# Patient Record
Sex: MALE | Employment: FULL TIME | URBAN - METROPOLITAN AREA
[De-identification: names, ages, dates, MRNs, and addresses within clinical notes are randomized per-mention and may not be internally consistent; named-entity substitution may affect disease eponyms.]

---

## 2019-05-29 ENCOUNTER — HOSPITAL ENCOUNTER (EMERGENCY)
Age: 24
Discharge: HOME | End: 2019-05-29
Payer: COMMERCIAL

## 2019-05-29 VITALS
HEART RATE: 84 BPM | OXYGEN SATURATION: 99 % | TEMPERATURE: 98 F | BODY MASS INDEX: 41.75 KG/M2 | RESPIRATION RATE: 16 BRPM | DIASTOLIC BLOOD PRESSURE: 80 MMHG | WEIGHT: 315 LBS | SYSTOLIC BLOOD PRESSURE: 136 MMHG | HEIGHT: 73 IN

## 2019-05-29 DIAGNOSIS — Z20.2 STD EXPOSURE: Primary | ICD-10-CM

## 2019-05-29 LAB
AMORPHOUS: ABNORMAL
BACTERIA: ABNORMAL /HPF
BILIRUBIN URINE: NEGATIVE
BLOOD, URINE: NEGATIVE
CLARITY: ABNORMAL
COLOR: YELLOW
EPITHELIAL CELLS, UA: ABNORMAL /HPF
GLUCOSE URINE: NEGATIVE MG/DL
KETONES, URINE: NEGATIVE MG/DL
LEUKOCYTE ESTERASE, URINE: NEGATIVE
NITRITE, URINE: NEGATIVE
PH UA: 7 (ref 5–9)
PROTEIN UA: NEGATIVE MG/DL
RBC UA: ABNORMAL /HPF (ref 0–2)
SPECIFIC GRAVITY UA: 1.01 (ref 1–1.03)
UROBILINOGEN, URINE: 0.2 E.U./DL
WBC UA: ABNORMAL /HPF (ref 0–5)

## 2019-05-29 PROCEDURE — 81001 URINALYSIS AUTO W/SCOPE: CPT

## 2019-05-29 PROCEDURE — 87088 URINE BACTERIA CULTURE: CPT

## 2019-05-29 PROCEDURE — 96372 THER/PROPH/DIAG INJ SC/IM: CPT

## 2019-05-29 PROCEDURE — 2500000003 HC RX 250 WO HCPCS: Performed by: NURSE PRACTITIONER

## 2019-05-29 PROCEDURE — 6370000000 HC RX 637 (ALT 250 FOR IP): Performed by: NURSE PRACTITIONER

## 2019-05-29 PROCEDURE — 6360000002 HC RX W HCPCS: Performed by: NURSE PRACTITIONER

## 2019-05-29 PROCEDURE — 99283 EMERGENCY DEPT VISIT LOW MDM: CPT

## 2019-05-29 RX ORDER — ONDANSETRON 4 MG/1
4 TABLET, ORALLY DISINTEGRATING ORAL ONCE
Status: COMPLETED | OUTPATIENT
Start: 2019-05-29 | End: 2019-05-29

## 2019-05-29 RX ORDER — METRONIDAZOLE 500 MG/1
2000 TABLET ORAL ONCE
Status: COMPLETED | OUTPATIENT
Start: 2019-05-29 | End: 2019-05-29

## 2019-05-29 RX ORDER — AZITHROMYCIN 250 MG/1
1000 TABLET, FILM COATED ORAL ONCE
Status: COMPLETED | OUTPATIENT
Start: 2019-05-29 | End: 2019-05-29

## 2019-05-29 RX ADMIN — METRONIDAZOLE 2000 MG: 500 TABLET, FILM COATED ORAL at 15:50

## 2019-05-29 RX ADMIN — LIDOCAINE HYDROCHLORIDE 250 MG: 10 INJECTION, SOLUTION EPIDURAL; INFILTRATION; INTRACAUDAL; PERINEURAL at 15:50

## 2019-05-29 RX ADMIN — AZITHROMYCIN 1000 MG: 250 TABLET, FILM COATED ORAL at 15:50

## 2019-05-29 RX ADMIN — ONDANSETRON 4 MG: 4 TABLET, ORALLY DISINTEGRATING ORAL at 15:50

## 2019-05-29 NOTE — ED PROVIDER NOTES
examination. · Integument:  Normal turgor. Warm, dry, without visible rash, unless noted elsewhere. Lymphatics: No lymphangitis or adenopathy noted. · Neurological:  Orientation age-appropriate. Motor functions intact. Lab / Imaging Results   (All laboratory and radiology results have been personally reviewed by myself)  Labs:  Results for orders placed or performed during the hospital encounter of 05/29/19   Urinalysis   Result Value Ref Range    Color, UA Yellow Straw/Yellow    Clarity, UA TURBID (A) Clear    Glucose, Ur Negative Negative mg/dL    Bilirubin Urine Negative Negative    Ketones, Urine Negative Negative mg/dL    Specific Gravity, UA 1.015 1.005 - 1.030    Blood, Urine Negative Negative    pH, UA 7.0 5.0 - 9.0    Protein, UA Negative Negative mg/dL    Urobilinogen, Urine 0.2 <2.0 E.U./dL    Nitrite, Urine Negative Negative    Leukocyte Esterase, Urine Negative Negative     Imaging: All Radiology results interpreted by Radiologist unless otherwise noted. No orders to display     ED Course / Medical Decision Making     Medications   cefTRIAXone (ROCEPHIN) 250 mg in lidocaine 1 % 1 mL IM Injection (250 mg Intramuscular Given 5/29/19 1550)   azithromycin (ZITHROMAX) tablet 1,000 mg (1,000 mg Oral Given 5/29/19 1550)   metroNIDAZOLE (FLAGYL) tablet 2,000 mg (2,000 mg Oral Given 5/29/19 1550)   ondansetron (ZOFRAN-ODT) disintegrating tablet 4 mg (4 mg Oral Given 5/29/19 1550)        Consult(s):   None    Procedure(s):   none    Medical Decision Making:    Plan to obtain cultures and treat for suspected STD and provide outpatient follow-up instructions. Discussed safe sex practices and partner notification analysis was negative for UTI has no symptoms at this time. Counseling: The emergency provider has spoken with the patient and discussed todays results, in addition to providing specific details for the plan of care and counseling regarding the diagnosis and prognosis.   Questions are answered at this time and they are agreeable with the plan. Assessment     1. STD exposure      Plan   Discharge to home  Patient condition is good    New Medications     New Prescriptions    No medications on file     Electronically signed by MARTÍN Matos CNP   DD: 5/29/19  **This report was transcribed using voice recognition software. Every effort was made to ensure accuracy; however, inadvertent computerized transcription errors may be present.   END OF ED PROVIDER NOTE      MARTÍN Matos CNP  05/29/19 8419
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
## 2019-05-29 NOTE — ED NOTES
Discharge instructions given, patient verbalized their understanding, no other noted or stated problems at this time. Patient will follow up with primary doctor for care.      Crystal Dodd RN  05/29/19 8337

## 2019-05-31 LAB — URINE CULTURE, ROUTINE: NORMAL

## 2019-06-11 ENCOUNTER — HOSPITAL ENCOUNTER (INPATIENT)
Age: 24
LOS: 16 days | Discharge: NURSING, REHAB OR LONG-TERM CARE | DRG: 003 | End: 2019-06-28
Attending: EMERGENCY MEDICINE | Admitting: SURGERY
Payer: OTHER MISCELLANEOUS

## 2019-06-11 DIAGNOSIS — S27.322A CONTUSION OF BOTH LUNGS, INITIAL ENCOUNTER: ICD-10-CM

## 2019-06-11 DIAGNOSIS — T14.90XA TRAUMA: ICD-10-CM
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
## 2019-06-11 DIAGNOSIS — S72.452A CLOSED DISPLACED SUPRACONDYLAR FRACTURE OF DISTAL END OF LEFT FEMUR WITHOUT INTRACONDYLAR EXTENSION, INITIAL ENCOUNTER (HCC): ICD-10-CM

## 2019-06-11 DIAGNOSIS — Z98.890 HISTORY OF SURGERY: ICD-10-CM

## 2019-06-11 DIAGNOSIS — J95.821 ACUTE RESPIRATORY FAILURE FOLLOWING TRAUMA AND SURGERY (HCC): ICD-10-CM

## 2019-06-11 DIAGNOSIS — S22.43XA CLOSED FRACTURE OF MULTIPLE RIBS OF BOTH SIDES, INITIAL ENCOUNTER: ICD-10-CM

## 2019-06-11 DIAGNOSIS — S16.1XXA STRAIN OF NECK MUSCLE, INITIAL ENCOUNTER: ICD-10-CM

## 2019-06-11 DIAGNOSIS — T07.XXXA MULTIPLE TRAUMA: ICD-10-CM

## 2019-06-11 DIAGNOSIS — S36.119A HEPATIC TRAUMA, INITIAL ENCOUNTER: ICD-10-CM

## 2019-06-11 DIAGNOSIS — S72.22XA CLOSED DISPLACED SUBTROCHANTERIC FRACTURE OF LEFT FEMUR, INITIAL ENCOUNTER (HCC): Primary | ICD-10-CM

## 2019-06-11 DIAGNOSIS — T07.XXXA MULTIPLE ABRASIONS: ICD-10-CM

## 2019-06-11 LAB
APTT: 40.8 SEC (ref 24.5–35.1)
B.E.: -3.9 MMOL/L (ref -3–3)
COHB: 0.1 % (ref 0–1.5)
COMMENT: ABNORMAL
CRITICAL: ABNORMAL
DATE ANALYZED: ABNORMAL
DATE OF COLLECTION: ABNORMAL
HCO3: 21.5 MMOL/L (ref 22–26)
HCT VFR BLD CALC: 41 % (ref 37–54)
HEMOGLOBIN: 13.3 G/DL (ref 12.5–16.5)
HHB: 0.4 % (ref 0–5)
INR BLD: 1.1
LAB: ABNORMAL
Lab: ABNORMAL
MCH RBC QN AUTO: 27.1 PG (ref 26–35)
MCHC RBC AUTO-ENTMCNC: 32.4 % (ref 32–34.5)
MCV RBC AUTO: 83.5 FL (ref 80–99.9)
METHB: 0.4 % (ref 0–1.5)
MODE: ABNORMAL
O2 CONTENT: 20.4 ML/DL
O2 SATURATION: 99.6 % (ref 92–98.5)
O2HB: 99.1 % (ref 94–97)
OPERATOR ID: 2577
PATIENT TEMP: 37 C
PCO2: 40.6 MMHG (ref 35–45)
PDW BLD-RTO: 13.2 FL (ref 11.5–15)
PH BLOOD GAS: 7.34 (ref 7.35–7.45)
PLATELET # BLD: 283 E9/L (ref 130–450)
PMV BLD AUTO: 9.4 FL (ref 7–12)
PO2: 406.9 MMHG (ref 60–100)
POTASSIUM SERPL-SCNC: 3.68 MMOL/L (ref 3.3–5.1)
PROTHROMBIN TIME: 12.6 SEC (ref 9.3–12.4)
RBC # BLD: 4.91 E12/L (ref 3.8–5.8)
SOURCE, BLOOD GAS: ABNORMAL
THB: 13.9 G/DL (ref 11.5–16.5)
TIME ANALYZED: 2330
WBC # BLD: 19.1 E9/L (ref 4.5–11.5)

## 2019-06-11 PROCEDURE — 82805 BLOOD GASES W/O2 SATURATION: CPT

## 2019-06-11 PROCEDURE — 84132 ASSAY OF SERUM POTASSIUM: CPT

## 2019-06-11 PROCEDURE — G0480 DRUG TEST DEF 1-7 CLASSES: HCPCS

## 2019-06-11 PROCEDURE — 99285 EMERGENCY DEPT VISIT HI MDM: CPT

## 2019-06-11 PROCEDURE — 80307 DRUG TEST PRSMV CHEM ANLYZR: CPT

## 2019-06-11 PROCEDURE — 86901 BLOOD TYPING SEROLOGIC RH(D): CPT

## 2019-06-11 PROCEDURE — 90471 IMMUNIZATION ADMIN: CPT

## 2019-06-11 PROCEDURE — 83605 ASSAY OF LACTIC ACID: CPT

## 2019-06-11 PROCEDURE — 80053 COMPREHEN METABOLIC PANEL: CPT

## 2019-06-11 PROCEDURE — 36415 COLL VENOUS BLD VENIPUNCTURE: CPT

## 2019-06-11 PROCEDURE — 36556 INSERT NON-TUNNEL CV CATH: CPT

## 2019-06-11 PROCEDURE — 6810039000 HC L1 TRAUMA ALERT

## 2019-06-11 PROCEDURE — 6360000002 HC RX W HCPCS

## 2019-06-11 PROCEDURE — 90715 TDAP VACCINE 7 YRS/> IM: CPT

## 2019-06-11 PROCEDURE — 85610 PROTHROMBIN TIME: CPT

## 2019-06-11 PROCEDURE — 6360000002 HC RX W HCPCS: Performed by: STUDENT IN AN ORGANIZED HEALTH CARE EDUCATION/TRAINING PROGRAM

## 2019-06-11 PROCEDURE — 85730 THROMBOPLASTIN TIME PARTIAL: CPT

## 2019-06-11 PROCEDURE — 86850 RBC ANTIBODY SCREEN: CPT

## 2019-06-11 PROCEDURE — 84484 ASSAY OF TROPONIN QUANT: CPT

## 2019-06-11 PROCEDURE — 86900 BLOOD TYPING SEROLOGIC ABO: CPT

## 2019-06-11 PROCEDURE — 85027 COMPLETE CBC AUTOMATED: CPT

## 2019-06-11 RX ORDER — KETAMINE HYDROCHLORIDE 10 MG/ML
1 INJECTION, SOLUTION INTRAMUSCULAR; INTRAVENOUS ONCE
Status: DISCONTINUED | OUTPATIENT
Start: 2019-06-11 | End: 2019-06-11

## 2019-06-11 RX ORDER — PROPOFOL 10 MG/ML
INJECTION, EMULSION INTRAVENOUS
Status: COMPLETED
Start: 2019-06-11 | End: 2019-06-12

## 2019-06-11 RX ORDER — FENTANYL CITRATE 50 UG/ML
INJECTION, SOLUTION INTRAMUSCULAR; INTRAVENOUS DAILY PRN
Status: COMPLETED | OUTPATIENT
Start: 2019-06-11 | End: 2019-06-11

## 2019-06-11 RX ORDER — SUCCINYLCHOLINE CHLORIDE 20 MG/ML
INJECTION INTRAMUSCULAR; INTRAVENOUS
Status: COMPLETED
Start: 2019-06-11 | End: 2019-06-12

## 2019-06-11 RX ORDER — MIDAZOLAM HYDROCHLORIDE 1 MG/ML
INJECTION INTRAMUSCULAR; INTRAVENOUS
Status: COMPLETED
Start: 2019-06-11 | End: 2019-06-12

## 2019-06-11 RX ORDER — ETOMIDATE 2 MG/ML
INJECTION INTRAVENOUS
Status: COMPLETED
Start: 2019-06-11 | End: 2019-06-12

## 2019-06-11 RX ADMIN — FENTANYL CITRATE 100 MCG: 50 INJECTION, SOLUTION INTRAMUSCULAR; INTRAVENOUS at 23:45

## 2019-06-11 RX ADMIN — FENTANYL CITRATE 50 MCG: 50 INJECTION, SOLUTION INTRAMUSCULAR; INTRAVENOUS at 23:35

## 2019-06-11 RX ADMIN — TETANUS TOXOID, REDUCED DIPHTHERIA TOXOID AND ACELLULAR PERTUSSIS VACCINE, ADSORBED 0.5 ML: 5; 2.5; 8; 8; 2.5 SUSPENSION INTRAMUSCULAR at 23:50

## 2019-06-11 ASSESSMENT — ENCOUNTER SYMPTOMS
EYES NEGATIVE: 1
VOMITING: 0
ABDOMINAL PAIN: 0
SHORTNESS OF BREATH: 0

## 2019-06-12 ENCOUNTER — APPOINTMENT (OUTPATIENT)
Dept: INTERVENTIONAL RADIOLOGY/VASCULAR | Age: 24
DRG: 003 | End: 2019-06-12
Payer: OTHER MISCELLANEOUS

## 2019-06-12 ENCOUNTER — APPOINTMENT (OUTPATIENT)
Dept: GENERAL RADIOLOGY | Age: 24
DRG: 003 | End: 2019-06-12
Payer: OTHER MISCELLANEOUS

## 2019-06-12 ENCOUNTER — APPOINTMENT (OUTPATIENT)
Dept: CT IMAGING | Age: 24
DRG: 003 | End: 2019-06-12
Payer: OTHER MISCELLANEOUS

## 2019-06-12 ENCOUNTER — ANESTHESIA EVENT (OUTPATIENT)
Dept: SURGICAL ICU | Age: 24
DRG: 003 | End: 2019-06-12
Payer: OTHER MISCELLANEOUS

## 2019-06-12 ENCOUNTER — TELEPHONE (OUTPATIENT)
Dept: ORTHOPEDIC SURGERY | Age: 24
End: 2019-06-12

## 2019-06-12 ENCOUNTER — ANESTHESIA (OUTPATIENT)
Dept: SURGICAL ICU | Age: 24
DRG: 003 | End: 2019-06-12
Payer: OTHER MISCELLANEOUS

## 2019-06-12 PROBLEM — S26.91XA CARDIAC CONTUSION: Status: ACTIVE | Noted: 2019-06-12

## 2019-06-12 PROBLEM — S12.501A CLOSED NONDISPLACED FRACTURE OF SIXTH CERVICAL VERTEBRA (HCC): Status: ACTIVE | Noted: 2019-06-12

## 2019-06-12 PROBLEM — S27.322A CONTUSION OF BOTH LUNGS: Status: ACTIVE | Noted: 2019-06-12

## 2019-06-12 PROBLEM — S22.43XA CLOSED FRACTURE OF MULTIPLE RIBS OF BOTH SIDES: Status: ACTIVE | Noted: 2019-06-12

## 2019-06-12 PROBLEM — J95.821 ACUTE RESPIRATORY FAILURE FOLLOWING TRAUMA AND SURGERY (HCC): Status: ACTIVE | Noted: 2019-06-12

## 2019-06-12 PROBLEM — E87.20 LACTIC ACIDOSIS: Status: ACTIVE | Noted: 2019-06-12

## 2019-06-12 PROBLEM — D62 ACUTE BLOOD LOSS ANEMIA: Status: ACTIVE | Noted: 2019-06-12

## 2019-06-12 PROBLEM — T14.90XA TRAUMA: Status: ACTIVE | Noted: 2019-06-12

## 2019-06-12 PROBLEM — S43.214A: Status: ACTIVE | Noted: 2019-06-12

## 2019-06-12 PROBLEM — S30.1XXA ABDOMINAL CONTUSION: Status: ACTIVE | Noted: 2019-06-12

## 2019-06-12 PROBLEM — S37.031A KIDNEY LACERATION, RIGHT, INITIAL ENCOUNTER: Status: ACTIVE | Noted: 2019-06-12

## 2019-06-12 PROBLEM — S43.394A: Status: ACTIVE | Noted: 2019-06-12

## 2019-06-12 PROBLEM — S72.452A CLOSED DISPLACED SUPRACONDYLAR FRACTURE OF DISTAL END OF LEFT FEMUR WITHOUT INTRACONDYLAR EXTENSION (HCC): Status: ACTIVE | Noted: 2019-06-12

## 2019-06-12 PROBLEM — S06.0X9A CONCUSSION WITH LOSS OF CONSCIOUSNESS: Status: ACTIVE | Noted: 2019-06-12

## 2019-06-12 PROBLEM — S36.119A LIVER INJURY: Status: ACTIVE | Noted: 2019-06-12

## 2019-06-12 PROBLEM — S72.352A CLOSED DISPLACED COMMINUTED FRACTURE OF SHAFT OF LEFT FEMUR (HCC): Status: ACTIVE | Noted: 2019-06-12

## 2019-06-12 PROBLEM — T79.6XXA TRAUMATIC RHABDOMYOLYSIS (HCC): Status: ACTIVE | Noted: 2019-06-12

## 2019-06-12 PROBLEM — E83.51 HYPOCALCEMIA: Status: ACTIVE | Noted: 2019-06-12

## 2019-06-12 LAB
AADO2: 230.6 MMHG
ABO/RH: NORMAL
ACETAMINOPHEN LEVEL: <5 MCG/ML (ref 10–30)
ALBUMIN SERPL-MCNC: 3.5 G/DL (ref 3.5–5.2)
ALBUMIN SERPL-MCNC: 4.1 G/DL (ref 3.5–5.2)
ALP BLD-CCNC: 51 U/L (ref 40–129)
ALP BLD-CCNC: 57 U/L (ref 40–129)
ALT SERPL-CCNC: 497 U/L (ref 0–40)
ALT SERPL-CCNC: 519 U/L (ref 0–40)
AMPHETAMINE SCREEN, URINE: NOT DETECTED
ANGLE (CLOT STRENGTH): 67.8 DEGREE (ref 59–74)
ANION GAP SERPL CALCULATED.3IONS-SCNC: 10 MMOL/L (ref 7–16)
ANION GAP SERPL CALCULATED.3IONS-SCNC: 11 MMOL/L (ref 7–16)
ANION GAP SERPL CALCULATED.3IONS-SCNC: 12 MMOL/L (ref 7–16)
ANION GAP SERPL CALCULATED.3IONS-SCNC: 13 MMOL/L (ref 7–16)
ANTIBODY SCREEN: NORMAL
AST SERPL-CCNC: 490 U/L (ref 0–39)
AST SERPL-CCNC: 569 U/L (ref 0–39)
B.E.: -4 MMOL/L (ref -3–3)
BARBITURATE SCREEN URINE: NOT DETECTED
BASOPHILS ABSOLUTE: 0.03 E9/L (ref 0–0.2)
BASOPHILS RELATIVE PERCENT: 0.2 % (ref 0–2)
BENZODIAZEPINE SCREEN, URINE: POSITIVE
BILIRUB SERPL-MCNC: 0.4 MG/DL (ref 0–1.2)
BILIRUB SERPL-MCNC: 0.8 MG/DL (ref 0–1.2)
BLOOD BANK DISPENSE STATUS: NORMAL
BLOOD BANK PRODUCT CODE: NORMAL
BPU ID: NORMAL
BUN BLDV-MCNC: 18 MG/DL (ref 6–20)
BUN BLDV-MCNC: 19 MG/DL (ref 6–20)
BUN BLDV-MCNC: 20 MG/DL (ref 6–20)
BUN BLDV-MCNC: 20 MG/DL (ref 6–20)
BURR CELLS: ABNORMAL
CALCIUM IONIZED: 1.07 MMOL/L (ref 1.15–1.33)
CALCIUM SERPL-MCNC: 7.5 MG/DL (ref 8.6–10.2)
CALCIUM SERPL-MCNC: 8.1 MG/DL (ref 8.6–10.2)
CALCIUM SERPL-MCNC: 8.6 MG/DL (ref 8.6–10.2)
CALCIUM SERPL-MCNC: 8.6 MG/DL (ref 8.6–10.2)
CANNABINOID SCREEN URINE: NOT DETECTED
CHLORIDE BLD-SCNC: 103 MMOL/L (ref 98–107)
CHLORIDE BLD-SCNC: 107 MMOL/L (ref 98–107)
CHLORIDE BLD-SCNC: 108 MMOL/L (ref 98–107)
CHLORIDE BLD-SCNC: 109 MMOL/L (ref 98–107)
CO2: 22 MMOL/L (ref 22–29)
CO2: 22 MMOL/L (ref 22–29)
CO2: 25 MMOL/L (ref 22–29)
CO2: 28 MMOL/L (ref 22–29)
COCAINE METABOLITE SCREEN URINE: NOT DETECTED
COHB: 0.5 % (ref 0–1.5)
CREAT SERPL-MCNC: 0.9 MG/DL (ref 0.7–1.2)
CREAT SERPL-MCNC: 1 MG/DL (ref 0.7–1.2)
CREAT SERPL-MCNC: 1.1 MG/DL (ref 0.7–1.2)
CREAT SERPL-MCNC: 1.3 MG/DL (ref 0.7–1.2)
CRITICAL: ABNORMAL
DATE ANALYZED: ABNORMAL
DATE OF COLLECTION: ABNORMAL
DESCRIPTION BLOOD BANK: NORMAL
EKG ATRIAL RATE: 118 BPM
EKG P AXIS: 56 DEGREES
EKG P-R INTERVAL: 132 MS
EKG Q-T INTERVAL: 322 MS
EKG QRS DURATION: 84 MS
EKG QTC CALCULATION (BAZETT): 451 MS
EKG R AXIS: 59 DEGREES
EKG T AXIS: 31 DEGREES
EKG VENTRICULAR RATE: 118 BPM
EOSINOPHILS ABSOLUTE: 0.02 E9/L (ref 0.05–0.5)
EOSINOPHILS RELATIVE PERCENT: 0.1 % (ref 0–6)
EPL-TEG: 0.1 % (ref 0–15)
ETHANOL: <10 MG/DL (ref 0–0.08)
FIO2: 80 %
G-TEG: 7.4 K D/SC (ref 4.5–11)
GFR AFRICAN AMERICAN: >60
GFR NON-AFRICAN AMERICAN: >60 ML/MIN/1.73
GLUCOSE BLD-MCNC: 129 MG/DL (ref 74–99)
GLUCOSE BLD-MCNC: 141 MG/DL (ref 74–99)
GLUCOSE BLD-MCNC: 163 MG/DL (ref 74–99)
GLUCOSE BLD-MCNC: 163 MG/DL (ref 74–99)
HCO3: 21.4 MMOL/L (ref 22–26)
HCT VFR BLD CALC: 33.8 % (ref 37–54)
HCT VFR BLD CALC: 33.9 % (ref 37–54)
HCT VFR BLD CALC: 37.2 % (ref 37–54)
HEMOGLOBIN: 11.3 G/DL (ref 12.5–16.5)
HEMOGLOBIN: 11.5 G/DL (ref 12.5–16.5)
HEMOGLOBIN: 12.4 G/DL (ref 12.5–16.5)
HHB: 0.9 % (ref 0–5)
IMMATURE GRANULOCYTES #: 0.2 E9/L
IMMATURE GRANULOCYTES %: 1.2 % (ref 0–5)
K (CLOTTING TIME): 1.6 MIN (ref 1–3)
LAB: ABNORMAL
LACTIC ACID: 2.1 MMOL/L (ref 0.5–2.2)
LACTIC ACID: 2.6 MMOL/L (ref 0.5–2.2)
LACTIC ACID: 3.2 MMOL/L (ref 0.5–2.2)
LACTIC ACID: 3.9 MMOL/L (ref 0.5–2.2)
LV EF: 65 %
LVEF MODALITY: NORMAL
LY30 (FIBRINOLYSIS): 0.1 % (ref 0–8)
LYMPHOCYTES ABSOLUTE: 1.01 E9/L (ref 1.5–4)
LYMPHOCYTES RELATIVE PERCENT: 6 % (ref 20–42)
Lab: ABNORMAL
MA (MAX AMPLITUDE): 59.8 MM (ref 50–70)
MAGNESIUM: 1.7 MG/DL (ref 1.6–2.6)
MCH RBC QN AUTO: 28.3 PG (ref 26–35)
MCHC RBC AUTO-ENTMCNC: 33.3 % (ref 32–34.5)
MCV RBC AUTO: 84.9 FL (ref 80–99.9)
METHADONE SCREEN, URINE: NOT DETECTED
METHB: 0.4 % (ref 0–1.5)
MODE: AC
MONOCYTES ABSOLUTE: 1.32 E9/L (ref 0.1–0.95)
MONOCYTES RELATIVE PERCENT: 7.8 % (ref 2–12)
NEUTROPHILS ABSOLUTE: 14.31 E9/L (ref 1.8–7.3)
NEUTROPHILS RELATIVE PERCENT: 84.7 % (ref 43–80)
O2 SATURATION: 99.1 % (ref 92–98.5)
O2HB: 98.2 % (ref 94–97)
OPERATOR ID: ABNORMAL
OPIATE SCREEN URINE: NOT DETECTED
PATIENT TEMP: 37 C
PCO2: 40 MMHG (ref 35–45)
PDW BLD-RTO: 13.8 FL (ref 11.5–15)
PEEP/CPAP: 5 CMH2O
PFO2: 3.47 MMHG/%
PH BLOOD GAS: 7.35 (ref 7.35–7.45)
PHENCYCLIDINE SCREEN URINE: NOT DETECTED
PHOSPHORUS: 3.6 MG/DL (ref 2.5–4.5)
PLATELET # BLD: 108 E9/L (ref 130–450)
PMV BLD AUTO: 9.3 FL (ref 7–12)
PO2: 277.8 MMHG (ref 60–100)
POIKILOCYTES: ABNORMAL
POLYCHROMASIA: ABNORMAL
POTASSIUM SERPL-SCNC: 4 MMOL/L (ref 3.5–5)
POTASSIUM SERPL-SCNC: 4.1 MMOL/L (ref 3.5–5)
POTASSIUM SERPL-SCNC: 4.3 MMOL/L (ref 3.5–5)
POTASSIUM SERPL-SCNC: 4.4 MMOL/L (ref 3.5–5)
PROPOXYPHENE SCREEN: NOT DETECTED
R (REACTION TIME): 4.4 MIN (ref 5–10)
RBC # BLD: 4.38 E12/L (ref 3.8–5.8)
RI(T): 0.83
RR MECHANICAL: 14 B/MIN
SALICYLATE, SERUM: <0.3 MG/DL (ref 0–30)
SODIUM BLD-SCNC: 142 MMOL/L (ref 132–146)
SODIUM BLD-SCNC: 144 MMOL/L (ref 132–146)
SOURCE, BLOOD GAS: ABNORMAL
THB: 13.1 G/DL (ref 11.5–16.5)
TIME ANALYZED: 947
TOTAL CK: 4528 U/L (ref 20–200)
TOTAL CK: 5749 U/L (ref 20–200)
TOTAL PROTEIN: 5.5 G/DL (ref 6.4–8.3)
TOTAL PROTEIN: 6.7 G/DL (ref 6.4–8.3)
TRICYCLIC ANTIDEPRESSANTS SCREEN SERUM: NEGATIVE NG/ML
TROPONIN: 0.29 NG/ML (ref 0–0.03)
TROPONIN: 0.37 NG/ML (ref 0–0.03)
TROPONIN: 0.42 NG/ML (ref 0–0.03)
TROPONIN: 0.47 NG/ML (ref 0–0.03)
VT MECHANICAL: 500 ML
WBC # BLD: 16.9 E9/L (ref 4.5–11.5)

## 2019-06-12 PROCEDURE — 2580000003 HC RX 258: Performed by: STUDENT IN AN ORGANIZED HEALTH CARE EDUCATION/TRAINING PROGRAM

## 2019-06-12 PROCEDURE — 74177 CT ABD & PELVIS W/CONTRAST: CPT

## 2019-06-12 PROCEDURE — 72170 X-RAY EXAM OF PELVIS: CPT

## 2019-06-12 PROCEDURE — 2500000003 HC RX 250 WO HCPCS: Performed by: STUDENT IN AN ORGANIZED HEALTH CARE EDUCATION/TRAINING PROGRAM

## 2019-06-12 PROCEDURE — 85018 HEMOGLOBIN: CPT

## 2019-06-12 PROCEDURE — P9016 RBC LEUKOCYTES REDUCED: HCPCS

## 2019-06-12 PROCEDURE — 71045 X-RAY EXAM CHEST 1 VIEW: CPT

## 2019-06-12 PROCEDURE — 70450 CT HEAD/BRAIN W/O DYE: CPT

## 2019-06-12 PROCEDURE — 0BH17EZ INSERTION OF ENDOTRACHEAL AIRWAY INTO TRACHEA, VIA NATURAL OR ARTIFICIAL OPENING: ICD-10-PCS | Performed by: SURGERY

## 2019-06-12 PROCEDURE — 80048 BASIC METABOLIC PNL TOTAL CA: CPT

## 2019-06-12 PROCEDURE — C9113 INJ PANTOPRAZOLE SODIUM, VIA: HCPCS | Performed by: STUDENT IN AN ORGANIZED HEALTH CARE EDUCATION/TRAINING PROGRAM

## 2019-06-12 PROCEDURE — 36430 TRANSFUSION BLD/BLD COMPNT: CPT

## 2019-06-12 PROCEDURE — 6360000004 HC RX CONTRAST MEDICATION: Performed by: RADIOLOGY

## 2019-06-12 PROCEDURE — 2500000003 HC RX 250 WO HCPCS

## 2019-06-12 PROCEDURE — 36620 INSERTION CATHETER ARTERY: CPT

## 2019-06-12 PROCEDURE — 04L33DZ OCCLUSION OF HEPATIC ARTERY WITH INTRALUMINAL DEVICE, PERCUTANEOUS APPROACH: ICD-10-PCS | Performed by: RADIOLOGY

## 2019-06-12 PROCEDURE — 82330 ASSAY OF CALCIUM: CPT

## 2019-06-12 PROCEDURE — 5A1955Z RESPIRATORY VENTILATION, GREATER THAN 96 CONSECUTIVE HOURS: ICD-10-PCS | Performed by: SURGERY

## 2019-06-12 PROCEDURE — 72131 CT LUMBAR SPINE W/O DYE: CPT

## 2019-06-12 PROCEDURE — P9059 PLASMA, FRZ BETWEEN 8-24HOUR: HCPCS

## 2019-06-12 PROCEDURE — 72128 CT CHEST SPINE W/O DYE: CPT

## 2019-06-12 PROCEDURE — 37244 VASC EMBOLIZE/OCCLUDE BLEED: CPT

## 2019-06-12 PROCEDURE — 36247 INS CATH ABD/L-EXT ART 3RD: CPT

## 2019-06-12 PROCEDURE — 80053 COMPREHEN METABOLIC PANEL: CPT

## 2019-06-12 PROCEDURE — 31500 INSERT EMERGENCY AIRWAY: CPT

## 2019-06-12 PROCEDURE — 73552 X-RAY EXAM OF FEMUR 2/>: CPT

## 2019-06-12 PROCEDURE — 37799 UNLISTED PX VASCULAR SURGERY: CPT

## 2019-06-12 PROCEDURE — G0480 DRUG TEST DEF 1-7 CLASSES: HCPCS

## 2019-06-12 PROCEDURE — 82805 BLOOD GASES W/O2 SATURATION: CPT

## 2019-06-12 PROCEDURE — 73590 X-RAY EXAM OF LOWER LEG: CPT

## 2019-06-12 PROCEDURE — 73551 X-RAY EXAM OF FEMUR 1: CPT

## 2019-06-12 PROCEDURE — 70486 CT MAXILLOFACIAL W/O DYE: CPT

## 2019-06-12 PROCEDURE — 84484 ASSAY OF TROPONIN QUANT: CPT

## 2019-06-12 PROCEDURE — 94002 VENT MGMT INPAT INIT DAY: CPT

## 2019-06-12 PROCEDURE — 36415 COLL VENOUS BLD VENIPUNCTURE: CPT

## 2019-06-12 PROCEDURE — 99291 CRITICAL CARE FIRST HOUR: CPT | Performed by: SURGERY

## 2019-06-12 PROCEDURE — 6370000000 HC RX 637 (ALT 250 FOR IP): Performed by: STUDENT IN AN ORGANIZED HEALTH CARE EDUCATION/TRAINING PROGRAM

## 2019-06-12 PROCEDURE — 85384 FIBRINOGEN ACTIVITY: CPT

## 2019-06-12 PROCEDURE — 6360000002 HC RX W HCPCS: Performed by: STUDENT IN AN ORGANIZED HEALTH CARE EDUCATION/TRAINING PROGRAM

## 2019-06-12 PROCEDURE — C1769 GUIDE WIRE: HCPCS

## 2019-06-12 PROCEDURE — 74018 RADEX ABDOMEN 1 VIEW: CPT

## 2019-06-12 PROCEDURE — 82550 ASSAY OF CK (CPK): CPT

## 2019-06-12 PROCEDURE — 71260 CT THORAX DX C+: CPT

## 2019-06-12 PROCEDURE — 36556 INSERT NON-TUNNEL CV CATH: CPT

## 2019-06-12 PROCEDURE — 84100 ASSAY OF PHOSPHORUS: CPT

## 2019-06-12 PROCEDURE — 85576 BLOOD PLATELET AGGREGATION: CPT

## 2019-06-12 PROCEDURE — 6370000000 HC RX 637 (ALT 250 FOR IP)

## 2019-06-12 PROCEDURE — 85014 HEMATOCRIT: CPT

## 2019-06-12 PROCEDURE — 85025 COMPLETE CBC W/AUTO DIFF WBC: CPT

## 2019-06-12 PROCEDURE — 75774 ARTERY X-RAY EACH VESSEL: CPT

## 2019-06-12 PROCEDURE — 36556 INSERT NON-TUNNEL CV CATH: CPT | Performed by: SURGERY

## 2019-06-12 PROCEDURE — 70498 CT ANGIOGRAPHY NECK: CPT

## 2019-06-12 PROCEDURE — 51702 INSERT TEMP BLADDER CATH: CPT

## 2019-06-12 PROCEDURE — 83735 ASSAY OF MAGNESIUM: CPT

## 2019-06-12 PROCEDURE — 93010 ELECTROCARDIOGRAM REPORT: CPT | Performed by: INTERNAL MEDICINE

## 2019-06-12 PROCEDURE — 31500 INSERT EMERGENCY AIRWAY: CPT | Performed by: ANESTHESIOLOGY

## 2019-06-12 PROCEDURE — 75726 ARTERY X-RAYS ABDOMEN: CPT

## 2019-06-12 PROCEDURE — 72125 CT NECK SPINE W/O DYE: CPT

## 2019-06-12 PROCEDURE — 6360000002 HC RX W HCPCS: Performed by: RADIOLOGY

## 2019-06-12 PROCEDURE — 73000 X-RAY EXAM OF COLLAR BONE: CPT

## 2019-06-12 PROCEDURE — 83605 ASSAY OF LACTIC ACID: CPT

## 2019-06-12 PROCEDURE — 85347 COAGULATION TIME ACTIVATED: CPT

## 2019-06-12 PROCEDURE — 2000000000 HC ICU R&B

## 2019-06-12 PROCEDURE — 76376 3D RENDER W/INTRP POSTPROCES: CPT

## 2019-06-12 PROCEDURE — 93005 ELECTROCARDIOGRAM TRACING: CPT | Performed by: STUDENT IN AN ORGANIZED HEALTH CARE EDUCATION/TRAINING PROGRAM

## 2019-06-12 PROCEDURE — 86923 COMPATIBILITY TEST ELECTRIC: CPT

## 2019-06-12 PROCEDURE — 2580000003 HC RX 258

## 2019-06-12 PROCEDURE — 73700 CT LOWER EXTREMITY W/O DYE: CPT

## 2019-06-12 PROCEDURE — 99222 1ST HOSP IP/OBS MODERATE 55: CPT | Performed by: NEUROLOGICAL SURGERY

## 2019-06-12 PROCEDURE — 87081 CULTURE SCREEN ONLY: CPT

## 2019-06-12 PROCEDURE — B4121ZZ FLUOROSCOPY OF HEPATIC ARTERY USING LOW OSMOLAR CONTRAST: ICD-10-PCS | Performed by: RADIOLOGY

## 2019-06-12 PROCEDURE — 6360000002 HC RX W HCPCS

## 2019-06-12 PROCEDURE — 80307 DRUG TEST PRSMV CHEM ANLYZR: CPT

## 2019-06-12 RX ORDER — ACETAMINOPHEN 325 MG/1
650 TABLET ORAL EVERY 4 HOURS PRN
Status: DISCONTINUED | OUTPATIENT
Start: 2019-06-12 | End: 2019-06-12

## 2019-06-12 RX ORDER — ETOMIDATE 2 MG/ML
20 INJECTION INTRAVENOUS ONCE
Status: COMPLETED | OUTPATIENT
Start: 2019-06-12 | End: 2019-06-12

## 2019-06-12 RX ORDER — VECURONIUM BROMIDE 1 MG/ML
INJECTION, POWDER, LYOPHILIZED, FOR SOLUTION INTRAVENOUS
Status: DISCONTINUED
Start: 2019-06-12 | End: 2019-06-12

## 2019-06-12 RX ORDER — ONDANSETRON 2 MG/ML
4 INJECTION INTRAMUSCULAR; INTRAVENOUS EVERY 6 HOURS PRN
Status: DISCONTINUED | OUTPATIENT
Start: 2019-06-12 | End: 2019-06-28 | Stop reason: HOSPADM

## 2019-06-12 RX ORDER — SODIUM CHLORIDE 0.9 % (FLUSH) 0.9 %
10 SYRINGE (ML) INJECTION PRN
Status: DISCONTINUED | OUTPATIENT
Start: 2019-06-12 | End: 2019-06-21 | Stop reason: SDUPTHER

## 2019-06-12 RX ORDER — PROPOFOL 10 MG/ML
10 INJECTION, EMULSION INTRAVENOUS
Status: DISCONTINUED | OUTPATIENT
Start: 2019-06-12 | End: 2019-06-14

## 2019-06-12 RX ORDER — VECURONIUM BROMIDE 1 MG/ML
INJECTION, POWDER, LYOPHILIZED, FOR SOLUTION INTRAVENOUS
Status: COMPLETED
Start: 2019-06-12 | End: 2019-06-12

## 2019-06-12 RX ORDER — SODIUM CHLORIDE 0.9 % (FLUSH) 0.9 %
10 SYRINGE (ML) INJECTION EVERY 12 HOURS SCHEDULED
Status: DISCONTINUED | OUTPATIENT
Start: 2019-06-12 | End: 2019-06-28 | Stop reason: HOSPADM

## 2019-06-12 RX ORDER — SODIUM CHLORIDE, SODIUM LACTATE, POTASSIUM CHLORIDE, CALCIUM CHLORIDE 600; 310; 30; 20 MG/100ML; MG/100ML; MG/100ML; MG/100ML
INJECTION, SOLUTION INTRAVENOUS CONTINUOUS
Status: DISCONTINUED | OUTPATIENT
Start: 2019-06-12 | End: 2019-06-19

## 2019-06-12 RX ORDER — 0.9 % SODIUM CHLORIDE 0.9 %
250 INTRAVENOUS SOLUTION INTRAVENOUS ONCE
Status: DISCONTINUED | OUTPATIENT
Start: 2019-06-12 | End: 2019-06-13

## 2019-06-12 RX ORDER — SODIUM CHLORIDE, SODIUM LACTATE, POTASSIUM CHLORIDE, AND CALCIUM CHLORIDE .6; .31; .03; .02 G/100ML; G/100ML; G/100ML; G/100ML
1000 INJECTION, SOLUTION INTRAVENOUS ONCE
Status: COMPLETED | OUTPATIENT
Start: 2019-06-12 | End: 2019-06-12

## 2019-06-12 RX ORDER — ACETAMINOPHEN 160 MG/5ML
SOLUTION ORAL
Status: COMPLETED
Start: 2019-06-12 | End: 2019-06-12

## 2019-06-12 RX ORDER — MINERAL OIL AND WHITE PETROLATUM 150; 830 MG/G; MG/G
OINTMENT OPHTHALMIC
Status: DISCONTINUED | OUTPATIENT
Start: 2019-06-12 | End: 2019-06-28 | Stop reason: HOSPADM

## 2019-06-12 RX ORDER — PANTOPRAZOLE SODIUM 40 MG/10ML
40 INJECTION, POWDER, LYOPHILIZED, FOR SOLUTION INTRAVENOUS DAILY
Status: DISCONTINUED | OUTPATIENT
Start: 2019-06-12 | End: 2019-06-17

## 2019-06-12 RX ORDER — 0.9 % SODIUM CHLORIDE 0.9 %
250 INTRAVENOUS SOLUTION INTRAVENOUS ONCE
Status: COMPLETED | OUTPATIENT
Start: 2019-06-12 | End: 2019-06-12

## 2019-06-12 RX ORDER — LIDOCAINE HYDROCHLORIDE 10 MG/ML
10 INJECTION, SOLUTION EPIDURAL; INFILTRATION; INTRACAUDAL; PERINEURAL SEE ADMIN INSTRUCTIONS
Status: DISCONTINUED | OUTPATIENT
Start: 2019-06-12 | End: 2019-06-13

## 2019-06-12 RX ORDER — EPINEPHRINE 0.1 MG/ML
20 SYRINGE (ML) INJECTION ONCE
Status: COMPLETED | OUTPATIENT
Start: 2019-06-12 | End: 2019-06-12

## 2019-06-12 RX ORDER — HEPARIN SODIUM 10000 [USP'U]/ML
5000 INJECTION, SOLUTION INTRAVENOUS; SUBCUTANEOUS EVERY 5 MIN PRN
Status: COMPLETED | OUTPATIENT
Start: 2019-06-12 | End: 2019-06-12

## 2019-06-12 RX ORDER — PROPOFOL 10 MG/ML
10 INJECTION, EMULSION INTRAVENOUS
Status: DISCONTINUED | OUTPATIENT
Start: 2019-06-12 | End: 2019-06-12

## 2019-06-12 RX ORDER — SODIUM CHLORIDE 9 MG/ML
INJECTION, SOLUTION INTRAVENOUS CONTINUOUS
Status: DISCONTINUED | OUTPATIENT
Start: 2019-06-12 | End: 2019-06-12

## 2019-06-12 RX ORDER — SUCCINYLCHOLINE CHLORIDE 20 MG/ML
200 INJECTION INTRAMUSCULAR; INTRAVENOUS ONCE
Status: COMPLETED | OUTPATIENT
Start: 2019-06-12 | End: 2019-06-12

## 2019-06-12 RX ORDER — CHLORHEXIDINE GLUCONATE 0.12 MG/ML
15 RINSE ORAL 2 TIMES DAILY
Status: DISCONTINUED | OUTPATIENT
Start: 2019-06-12 | End: 2019-06-28 | Stop reason: HOSPADM

## 2019-06-12 RX ADMIN — Medication 5000 UNITS: at 03:15

## 2019-06-12 RX ADMIN — Medication 5000 UNITS: at 03:05

## 2019-06-12 RX ADMIN — Medication 0.02 MG: at 01:55

## 2019-06-12 RX ADMIN — IOPAMIDOL 110 ML: 755 INJECTION, SOLUTION INTRAVENOUS at 00:00

## 2019-06-12 RX ADMIN — CHLORHEXIDINE GLUCONATE 0.12% ORAL RINSE 15 ML: 1.2 LIQUID ORAL at 20:34

## 2019-06-12 RX ADMIN — SODIUM CHLORIDE: 9 INJECTION, SOLUTION INTRAVENOUS at 04:37

## 2019-06-12 RX ADMIN — METHOCARBAMOL 1000 MG: 100 INJECTION, SOLUTION INTRAMUSCULAR; INTRAVENOUS at 19:00

## 2019-06-12 RX ADMIN — PROPOFOL 10 MCG/KG/MIN: 10 INJECTION, EMULSION INTRAVENOUS at 01:01

## 2019-06-12 RX ADMIN — ONDANSETRON HYDROCHLORIDE 4 MG: 2 SOLUTION INTRAMUSCULAR; INTRAVENOUS at 00:45

## 2019-06-12 RX ADMIN — PANTOPRAZOLE SODIUM 40 MG: 40 INJECTION, POWDER, FOR SOLUTION INTRAVENOUS at 09:38

## 2019-06-12 RX ADMIN — EPINEPHRINE 20 MCG: 0.1 INJECTION, SOLUTION ENDOTRACHEAL; INTRACARDIAC; INTRAVENOUS at 00:10

## 2019-06-12 RX ADMIN — CHLORHEXIDINE GLUCONATE 0.12% ORAL RINSE 15 ML: 1.2 LIQUID ORAL at 09:20

## 2019-06-12 RX ADMIN — VECURONIUM BROMIDE 10 MG: 1 INJECTION, POWDER, LYOPHILIZED, FOR SOLUTION INTRAVENOUS at 01:20

## 2019-06-12 RX ADMIN — MIDAZOLAM 2 MG: 1 INJECTION INTRAMUSCULAR; INTRAVENOUS at 00:00

## 2019-06-12 RX ADMIN — SODIUM CHLORIDE, POTASSIUM CHLORIDE, SODIUM LACTATE AND CALCIUM CHLORIDE: 600; 310; 30; 20 INJECTION, SOLUTION INTRAVENOUS at 10:22

## 2019-06-12 RX ADMIN — PROPOFOL 30 MCG/KG/MIN: 10 INJECTION, EMULSION INTRAVENOUS at 12:33

## 2019-06-12 RX ADMIN — IOVERSOL 40 ML: 678 INJECTION INTRA-ARTERIAL; INTRAVENOUS at 03:55

## 2019-06-12 RX ADMIN — SODIUM CHLORIDE 250 ML: 9 INJECTION, SOLUTION INTRAVENOUS at 04:38

## 2019-06-12 RX ADMIN — ACETAMINOPHEN ORAL SOLUTION 650 MG: 650 SOLUTION ORAL at 20:34

## 2019-06-12 RX ADMIN — Medication 10 ML: at 09:20

## 2019-06-12 RX ADMIN — PROPOFOL 30 MCG/KG/MIN: 10 INJECTION, EMULSION INTRAVENOUS at 20:43

## 2019-06-12 RX ADMIN — Medication 5000 UNITS: at 03:20

## 2019-06-12 RX ADMIN — SODIUM CHLORIDE, POTASSIUM CHLORIDE, SODIUM LACTATE AND CALCIUM CHLORIDE: 600; 310; 30; 20 INJECTION, SOLUTION INTRAVENOUS at 18:37

## 2019-06-12 RX ADMIN — WATER: 1 INJECTION INTRAMUSCULAR; INTRAVENOUS; SUBCUTANEOUS at 01:20

## 2019-06-12 RX ADMIN — ETOMIDATE: 2 INJECTION, SOLUTION INTRAVENOUS at 00:54

## 2019-06-12 RX ADMIN — PROPOFOL 33.92 MCG/KG/MIN: 10 INJECTION, EMULSION INTRAVENOUS at 09:19

## 2019-06-12 RX ADMIN — SUCCINYLCHOLINE CHLORIDE 200 MG: 20 INJECTION INTRAMUSCULAR; INTRAVENOUS at 00:55

## 2019-06-12 RX ADMIN — VECURONIUM BROMIDE FOR INJECTION: 1 INJECTION, POWDER, LYOPHILIZED, FOR SOLUTION INTRAVENOUS at 01:20

## 2019-06-12 RX ADMIN — ETOMIDATE 20 MG: 2 INJECTION, SOLUTION INTRAVENOUS at 00:54

## 2019-06-12 RX ADMIN — CALCIUM GLUCONATE 3 G: 98 INJECTION, SOLUTION INTRAVENOUS at 11:24

## 2019-06-12 RX ADMIN — Medication 5000 UNITS: at 03:00

## 2019-06-12 RX ADMIN — PROPOFOL 30 MCG/KG/MIN: 10 INJECTION, EMULSION INTRAVENOUS at 18:00

## 2019-06-12 RX ADMIN — Medication 175 MCG/HR: at 21:20

## 2019-06-12 RX ADMIN — Medication 10 ML: at 20:35

## 2019-06-12 RX ADMIN — SODIUM CHLORIDE, POTASSIUM CHLORIDE, SODIUM LACTATE AND CALCIUM CHLORIDE 1000 ML: 600; 310; 30; 20 INJECTION, SOLUTION INTRAVENOUS at 14:36

## 2019-06-12 RX ADMIN — SUCCINYLCHOLINE CHLORIDE 200 MG: 20 INJECTION, SOLUTION INTRAMUSCULAR; INTRAVENOUS at 00:55

## 2019-06-12 RX ADMIN — PROPOFOL 30 MCG/KG/MIN: 10 INJECTION, EMULSION INTRAVENOUS at 13:17

## 2019-06-12 RX ADMIN — METHOCARBAMOL 1000 MG: 100 INJECTION, SOLUTION INTRAMUSCULAR; INTRAVENOUS at 10:53

## 2019-06-12 RX ADMIN — Medication 5000 UNITS: at 03:10

## 2019-06-12 RX ADMIN — SODIUM CHLORIDE, POTASSIUM CHLORIDE, SODIUM LACTATE AND CALCIUM CHLORIDE 1000 ML: 600; 310; 30; 20 INJECTION, SOLUTION INTRAVENOUS at 19:22

## 2019-06-12 ASSESSMENT — PULMONARY FUNCTION TESTS
PIF_VALUE: 28
PIF_VALUE: 25
PIF_VALUE: 21
PIF_VALUE: 22
PIF_VALUE: 21
PIF_VALUE: 24
PIF_VALUE: 28
PIF_VALUE: 23
PIF_VALUE: 26
PIF_VALUE: 24
PIF_VALUE: 27
PIF_VALUE: 25
PIF_VALUE: 21
PIF_VALUE: 31
PIF_VALUE: 23
PIF_VALUE: 27
PIF_VALUE: 21
PIF_VALUE: 9
PIF_VALUE: 9
PIF_VALUE: 24
PIF_VALUE: 22
PIF_VALUE: 24
PIF_VALUE: 23
PIF_VALUE: 29
PIF_VALUE: 4
PIF_VALUE: 28
PIF_VALUE: 26
PIF_VALUE: 29
PIF_VALUE: 23
PIF_VALUE: 27
PIF_VALUE: 26
PIF_VALUE: 30
PIF_VALUE: 25
PIF_VALUE: 31
PIF_VALUE: 24

## 2019-06-12 NOTE — H&P
H&P Update    Patient's History and Physical  was reviewed. The patient appears likely to able to tolerate the procedure. Risk and benefits discussed including ultimate complications, possibly death and consent obtained.     Adeola Vaughn, II
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
## 2019-06-12 NOTE — PRE SEDATION
Adelaida Hughes II, MD  6/12/2019  2:02 AM        PRE-SEDATION PHYSICIAN ASSESSMENT:      1. HISTORY & PHYSICAL EXAMINATION:  Comments: none    Vitals:    06/12/19 0157   BP: 115/77   Pulse: 110   Resp: 20   Temp: 99.9 °F (37.7 °C)   SpO2:        Allergies: Patient has no known allergies. 2. Heart and Lungs immediately prior to procedure demonstrate no contraindications to proceed      Chief Complaint: <principal problem not specified>    Drug: unknown  Tobacco: unknown    3. PAST ANESTHESIA EXPERIENCE:  unknown. 4. AIRWAY/TEETH/HEAD & NECK(Mallampati Classification):  II (soft palate, uvula, fauces visible)    5: NORMAL RANGE OF MOTION OF NECK: No    6. PATIENT WILL LIKELY TOLERATE PLAN OF MODERATE SEDATION    7. ASA 2.     Salome Dykes MD

## 2019-06-12 NOTE — POST SEDATION
POST SEDATION NOTE:  Time: 2:02 AM    Cardiopulmonary: Vitals Signs Stable: yes    Level of Consciousness: alert    Reversal Agent Used: No    Complications: none    Follow-up/Observations: none    Pain Score: 1    Miriam Patel MD

## 2019-06-12 NOTE — ED NOTES
Xray at bedside. Blood obtained from right femoral by dr. Romero Hickey.      [address] [facility] 121, RN  06/11/19 2224

## 2019-06-12 NOTE — FLOWSHEET NOTE
Restless. Attempting to reach for et tube, og tube and radford catheter. Unable to verbally redirect. Soft restraints to right and left wrists.

## 2019-06-12 NOTE — FLOWSHEET NOTE
Restraints released for repositioning and reassessment of restraints. Patient continues to grab ETT/og. Poor safety awareness. Unable to redirect at this time. Bilateral soft wrist restraints continued for patient safety.

## 2019-06-12 NOTE — ED NOTES
Bilateral abrasions to knees, chest and abdomin per dr swenson.      [address] [facility] 121, RN  06/11/19 3518

## 2019-06-12 NOTE — PROGRESS NOTES
therapy -  continue  5. bronchopulmonary hygiene -  continue  6. bronchodilators -  continue    RENAL/FLUID/ELECTROLYTE:  PROBLEMS:  1. hypocalcemia, metabolic acidosis  2. Lactic acidosis  3. rhabdomyolysis  PLAN:  1. radford catheter -  continue  2. avoid nephrotoxins  3. replace electrolytes  4. Monitor CK    GI/NUTRITION:  PROBLEMS:  1. malnutrition  2. Dysphagia  3. Liver laceration  4. Kidney laceration  5. CT A/P--fluid around spleen--likely from liver injury; omental contusion, pancreas appears to be intact  PLAN:  1. NPO for now  2. Monitor H/H  3. Monitor LFTs    ID:  PROBLEMS:  1. No active issues   PLAN:  1. Change out introducoer    HEMATOLOGIC:  PROBLEMS:  1. acute blood loss anemia  PLAN:  1. CBC  2. coagulation studies  3. TEG    ENDOCRINE:  PROBLEMS:  1. No active issues   PLAN:  1. No active issues         PROPHYLAXIS:   Stress ulcer: PPI   VTE: SCDs      DISPOSITION:   Continue ICU. CC TIME:  I spent 50 min managing this patients critical issues which included rhabdo, multiple injuries, acute respiratory failure excluding time teaching and performing procedures.         Manson Phoenix, MD  6/12/2019  4:18 PM
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
## 2019-06-12 NOTE — ED NOTES
ABG obtained from right fem by dr. Yanet Bain.       [address] [facility] 121, RN  06/11/19 8306

## 2019-06-12 NOTE — PROGRESS NOTES
PATIENT WAS SENT BACK TO SICU TO BE GIVEN MORE BLOOD.   RN TO CALL WHEN PATIENT IS READY FOR FURTHER IMAGING
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
## 2019-06-12 NOTE — ANESTHESIA PROCEDURE NOTES
Airway  Urgency: urgent    Airway not difficult    General Information and Staff    Patient location during procedure: ICU  Anesthesiologist: Aimee Signs, DO  Resident/CRNA: MARTÍN Frausto CRNA  Performed: resident/CRNA     Consent for Airway (if performed for an anesthetic, see related documentation for consents)  Patient identity confirmed: per hospital policy  Consent: The procedure was performed in an emergent situation. Verbal consent not obtained. Written consent not obtained.   Risks and benefits: risks, benefits and alternatives were not discussed      Code status verified:yes  Indications and Patient Condition  Indications for airway management: airway protection and respiratory distress  Spontaneous ventilation: present  Sedation level: deep  Preoxygenated: yes  Patient position: sniffing  MILS not maintained throughout  Mask difficulty assessment: not attempted    Final Airway Details  Final airway type: endotracheal airway      Successful airway: ETT  Cuffed: yes   Successful intubation technique: video laryngoscopy  Facilitating devices/methods: intubating stylet  Endotracheal tube insertion site: oral  Blade size: #4  ETT size (mm): 8.0  Cormack-Lehane Classification: grade I - full view of glottis  Placement verified by: chest auscultation and capnometry   Measured from: lips  ETT to lips (cm): 22  Number of attempts at approach: 1  Ventilation between attempts: bag mask  Number of other approaches attempted: 0    no

## 2019-06-12 NOTE — ED NOTES
Reynold Rinne, dellostritto, and leela stabilizing left femur.         [address] [facility] 121, RN  06/11/19 0504

## 2019-06-12 NOTE — ED NOTES
Blood noted in mouth. Interior chest wall pain per dr. Juan Coyle.      [address] [facility] 121, RN  06/11/19 6298
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
## 2019-06-12 NOTE — DISCHARGE SUMMARY
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  Imaging protocol: CT of the Left lower extremity without contrast was performed. Exam focused on the femur. Coronal and sagittal reformatted images were created and reviewed. Radiation optimization: All CT scans at this facility use at least one of these dose optimization techniques: automated exposure control; mA and/or kV adjustment per patient size (includes targeted exams where dose is matched to clinical indication); or iterative reconstruction. COMPARISON:  No relevant prior studies available. FINDINGS:  Bones/joints: Nondisplaced fracture involving the lateral aspect of the patella. Cardiac displaced fracture involving the mid to distal femoral diaphysis with comminution and displacement greater distally. There is mild posterior displacement of the dominant distal fracture fragment. Soft tissues: No radiodense foreign body. 1. Comminuted and displaced fracture involving the mid to distal femoral diaphysis. 2. Nondisplaced fracture involving the lateral aspect of the patella.  This report has been electronically signed by Radha Hendricks MD.    Cta Neck W [id number] : 1995 Age: 22 years Gender: Male Order Date:  2019 1:30 AM EXAM: IR EMBOLIZATION HEMORRHAGE, IR GERRY ART CATH ABD PELV LOWER EXT INIT 3RD+ ORDER, IR ANGIOGRAM HEPATIC, IR ANGIOGRAM SELECTIVE EACH ADDITIONAL VESSEL, IR ANGIOGRAM SELECTIVE EACH ADDITIONAL VESSEL NUMBER OF IMAGES:  12 INDICATION:  Liver laceration with blush Liver laceration with blush COMPARISON: None After obtaining informed consent and following the routine sterile prep and drape and after administration of local anesthesia a needle was inserted into the right common femoral artery. Guidewire and catheter were advanced into the abdominal aorta and fluoroscopic images were obtained. Catheter was then advanced into the celiac axis and images were obtained. The right and the left hepatic artery were visualized as well as a patent gastroduodenal artery Subsequently a catheter was advanced into the right hepatic artery and images were obtained to define the anatomy. No anatomic variation was identified Subsequently embolization of a segmental branch of the right hepatic artery was performed. Embolization was performed with both embosphere and microcoils. Postprocedural images demonstrated occlusion The patient tolerated the procedure well. There are no complications. Time out occurred at 0306 hours. Patient received 7 minutes and 14 seconds  of fluoroscopy. Right hepatic artery embolization     Ct 3d Reconstruction    Result Date: 2019  EXAM:  3D RECON IND WKSTN EXAM DATE/TIME:  2019 12:00 AM CLINICAL HISTORY:  22years old, male; Injury or trauma; Additional info: Left femur fracture TECHNIQUE:  Imaging protocol: Coronal and sagittal reformatted images were created and reviewed. COMPARISON:  No relevant prior studies available. FINDINGS:   Comminuted and displaced fracture involving the mid to distal femoral diaphysis. Comminution and displacement is greater distally.  There is mild posterior displacement of the dominant distal fracture fragment. Nondisplaced fracture involving the lateral aspect of the patella. No radiodense foreign body. 1. Comminuted and displaced fracture involving the mid to distal femoral diaphysis. 2. Nondisplaced fracture involving the lateral aspect of the patella. This report has been electronically signed by Karen Hernandez MD.    Xr Abdomen For Ng/og/ne Tube Placement    Result Date: 2019  Patient MRN:  [id number] : 1995 Age: 22 years Gender: Male Order Date:  2019 1:00 AM EXAM: XR ABDOMEN FOR NG/OG/NE TUBE PLACEMENT NUMBER OF IMAGES:  1 views INDICATION:  Confirmation of course of NG/OG/NE tube and location of tip of tube Confirmation of course of NG/OG/NE tube and location of tip of tube Portable? ->Yes COMPARISON: None FINDINGS:  This study is centered on the diaphragm. The study is performed for evaluation of the position of the NG tube. There is incomplete evaluation of the chest. There is incomplete evaluation of the abdomen. The visualized portions of the abdomen reveal the bowel gas pattern is nonspecific. An NG tube is noted. The tip of the tube is at the expected level of the gastric fundus. Ir Angiogram Hepatic    Result Date: 2019  Patient MRN:  [id number] : 1995 Age: 22 years Gender: Male Order Date:  2019 1:30 AM EXAM: IR EMBOLIZATION HEMORRHAGE, IR GERRY ART CATH ABD PELV LOWER EXT INIT 3RD+ ORDER, IR ANGIOGRAM HEPATIC, IR ANGIOGRAM SELECTIVE EACH ADDITIONAL VESSEL, IR ANGIOGRAM SELECTIVE EACH ADDITIONAL VESSEL NUMBER OF IMAGES:  12 INDICATION:  Liver laceration with blush Liver laceration with blush COMPARISON: None After obtaining informed consent and following the routine sterile prep and drape and after administration of local anesthesia a needle was inserted into the right common femoral artery. Guidewire and catheter were advanced into the abdominal aorta and fluoroscopic images were obtained.  Catheter was then advanced into the celiac SERVICE  DISCHARGE INSTRUCTIONS    ACTIVITY INSTRUCTIONS:    []Elevate operative/injured limb on 2 pillows at home   []Sling to operative/injured limb  []No heavy lifting, pushing, pulling or strenuous activity      []Use crutches  []Use walker   []Use wheelchair  [x]Weight bearing Status: progressive weightbearing at 25% increase as tolerated to the left lower extremity  []Outpatient PT/OT prescription given and take protocol with you if given one (see below)  []Follow protocol which has been given to you with your discharge instructions  []Other    WOUND/DRESSING INSTRUCTIONS:  Always ensure you and your care giver clean hands before and after caring for the wound. []May shower after dressing has been removed     []May NOT shower until seen in office and sutures/staples removed  [x]Keep splint clean and dry, do not remove or get wet      []Keep dressing clean and dry            []Do not remove dressing   []Remove dressing on     []Leave steri strips in place        [x] Ice to operative/injured site for 15-30 minutes of each hour for next 3-5 days     []Other      MEDICATION INSTRUCTIONS:  [x]Take pain medicine as directed. When taking pain medications, you may experience dizziness or drowsiness. Do not drink alcohol or drive when taking these medications. []Take blood thinner medications as directed and continue taking until instructed to stop by your Orthopaedic Surgeon. [x]Give the list of your medications to your primary care physician on your next visit. Keep your med list updated and carry it with you in case of emergency  Other Instructions:    FOLLOW-UP CARE:  [x]Follow up with your Orthopaedic Surgeon Dr. Shira Mora. Call the office at [phone] for directions or with any questions. Watch for these significant complications.   Call physician if they or any other problems occur:  - Fever over 101°, redness, swelling or warmth at the operative site  - Unrelieved nausea    - Foul smelling or cloudy drainage at the operative site   - Unrelieved pain    - Blood soaked dressing. (Some oozing may be normal)     - Numb, pale, blue, cold or tingling extremity    [facility]  Additional Discharge Instructions    Call [phone] for any questions/concerns and for follow up appointment in 2 weeks    Please follow the instructions checked below:    ACTIVITY INSTRUCTIONS:  [x]Increase activity as tolerated    [x]No driving until cleared by Trauma and Orthopedics  [x]No driving while taking pain medication  [x]Incentive Spirometer 4 - 6 times a day   [x]Wear cervical collar at all times    WOUND/DRESSING INSTRUCTIONS:  [x]Routine trach and PEG care  [x]Trach stures to be removed on or after      MEDICATION INSTRUCTIONS:  [x]Take medication as prescribed.     Call physician for any of the following or for questions/concerns:   · Redness, swelling, hardness or warmth at the wound site (s)  · Unrelieved nausea/vomiting    · Foul smelling or cloudy drainage at the wound site (s)   · Unrelieved pain or increase in pain     · Increase in shortness of breath     Follow up:   Clayton Carlton On license of [facility] [address]  [phone]      post op follow up scheduled for 19 at 9:00 AM    [address]  [address] [place] [address] [facility] [phone]  In 2 weeks         Signed:  Electronically signed by Erika Rivero MD on 2019 at 3:31 PM

## 2019-06-13 ENCOUNTER — APPOINTMENT (OUTPATIENT)
Dept: GENERAL RADIOLOGY | Age: 24
DRG: 003 | End: 2019-06-13
Payer: OTHER MISCELLANEOUS

## 2019-06-13 ENCOUNTER — POST-OP TELEPHONE (OUTPATIENT)
Dept: INTERVENTIONAL RADIOLOGY/VASCULAR | Age: 24
End: 2019-06-13

## 2019-06-13 LAB
AADO2: 217 MMHG
ALBUMIN SERPL-MCNC: 2.9 G/DL (ref 3.5–5.2)
ALP BLD-CCNC: 126 U/L (ref 40–129)
ALT SERPL-CCNC: 2016 U/L (ref 0–40)
ANION GAP SERPL CALCULATED.3IONS-SCNC: 10 MMOL/L (ref 7–16)
ANION GAP SERPL CALCULATED.3IONS-SCNC: 11 MMOL/L (ref 7–16)
ANION GAP SERPL CALCULATED.3IONS-SCNC: 12 MMOL/L (ref 7–16)
ANION GAP SERPL CALCULATED.3IONS-SCNC: 13 MMOL/L (ref 7–16)
ANISOCYTOSIS: ABNORMAL
AST SERPL-CCNC: 1390 U/L (ref 0–39)
B.E.: -1.7 MMOL/L (ref -3–3)
BASOPHILS ABSOLUTE: 0.05 E9/L (ref 0–0.2)
BASOPHILS RELATIVE PERCENT: 0.4 % (ref 0–2)
BILIRUB SERPL-MCNC: 0.6 MG/DL (ref 0–1.2)
BUN BLDV-MCNC: 20 MG/DL (ref 6–20)
BUN BLDV-MCNC: 21 MG/DL (ref 6–20)
BUN BLDV-MCNC: 22 MG/DL (ref 6–20)
BUN BLDV-MCNC: 22 MG/DL (ref 6–20)
CALCIUM IONIZED: 1.15 MMOL/L (ref 1.15–1.33)
CALCIUM SERPL-MCNC: 8 MG/DL (ref 8.6–10.2)
CALCIUM SERPL-MCNC: 8 MG/DL (ref 8.6–10.2)
CALCIUM SERPL-MCNC: 8.1 MG/DL (ref 8.6–10.2)
CALCIUM SERPL-MCNC: 8.2 MG/DL (ref 8.6–10.2)
CHLORIDE BLD-SCNC: 103 MMOL/L (ref 98–107)
CHLORIDE BLD-SCNC: 104 MMOL/L (ref 98–107)
CHLORIDE BLD-SCNC: 105 MMOL/L (ref 98–107)
CHLORIDE BLD-SCNC: 105 MMOL/L (ref 98–107)
CO2: 21 MMOL/L (ref 22–29)
CO2: 23 MMOL/L (ref 22–29)
COHB: 0 % (ref 0–1.5)
CREAT SERPL-MCNC: 0.9 MG/DL (ref 0.7–1.2)
CREAT SERPL-MCNC: 1.1 MG/DL (ref 0.7–1.2)
CREAT SERPL-MCNC: 1.2 MG/DL (ref 0.7–1.2)
CREAT SERPL-MCNC: 1.3 MG/DL (ref 0.7–1.2)
CRITICAL: ABNORMAL
DATE ANALYZED: ABNORMAL
DATE OF COLLECTION: ABNORMAL
EKG ATRIAL RATE: 131 BPM
EKG P AXIS: 32 DEGREES
EKG P-R INTERVAL: 148 MS
EKG Q-T INTERVAL: 296 MS
EKG QRS DURATION: 92 MS
EKG QTC CALCULATION (BAZETT): 437 MS
EKG R AXIS: 44 DEGREES
EKG T AXIS: 0 DEGREES
EKG VENTRICULAR RATE: 131 BPM
EOSINOPHILS ABSOLUTE: 0.11 E9/L (ref 0.05–0.5)
EOSINOPHILS RELATIVE PERCENT: 0.9 % (ref 0–6)
FIO2: 60 %
GFR AFRICAN AMERICAN: >60
GFR NON-AFRICAN AMERICAN: >60 ML/MIN/1.73
GLUCOSE BLD-MCNC: 137 MG/DL (ref 74–99)
GLUCOSE BLD-MCNC: 137 MG/DL (ref 74–99)
GLUCOSE BLD-MCNC: 141 MG/DL (ref 74–99)
GLUCOSE BLD-MCNC: 143 MG/DL (ref 74–99)
HCO3: 23.9 MMOL/L (ref 22–26)
HCT VFR BLD CALC: 27.6 % (ref 37–54)
HCT VFR BLD CALC: 28.1 % (ref 37–54)
HCT VFR BLD CALC: 29.9 % (ref 37–54)
HCT VFR BLD CALC: 31.5 % (ref 37–54)
HEMOGLOBIN: 10.5 G/DL (ref 12.5–16.5)
HEMOGLOBIN: 9.4 G/DL (ref 12.5–16.5)
HEMOGLOBIN: 9.4 G/DL (ref 12.5–16.5)
HEMOGLOBIN: 9.8 G/DL (ref 12.5–16.5)
HHB: 1.9 % (ref 0–5)
IMMATURE GRANULOCYTES #: 0.06 E9/L
IMMATURE GRANULOCYTES %: 0.5 % (ref 0–5)
LAB: ABNORMAL
LACTIC ACID: 1.7 MMOL/L (ref 0.5–2.2)
LACTIC ACID: 2.1 MMOL/L (ref 0.5–2.2)
LACTIC ACID: 2.2 MMOL/L (ref 0.5–2.2)
LACTIC ACID: 2.3 MMOL/L (ref 0.5–2.2)
LYMPHOCYTES ABSOLUTE: 1.74 E9/L (ref 1.5–4)
LYMPHOCYTES RELATIVE PERCENT: 13.5 % (ref 20–42)
Lab: ABNORMAL
MAGNESIUM: 1.7 MG/DL (ref 1.6–2.6)
MCH RBC QN AUTO: 27.8 PG (ref 26–35)
MCHC RBC AUTO-ENTMCNC: 32.8 % (ref 32–34.5)
MCV RBC AUTO: 84.9 FL (ref 80–99.9)
METHB: 0.4 % (ref 0–1.5)
MODE: AC
MONOCYTES ABSOLUTE: 1.78 E9/L (ref 0.1–0.95)
MONOCYTES RELATIVE PERCENT: 13.8 % (ref 2–12)
MRSA CULTURE ONLY: NORMAL
NEUTROPHILS ABSOLUTE: 9.18 E9/L (ref 1.8–7.3)
NEUTROPHILS RELATIVE PERCENT: 70.9 % (ref 43–80)
O2 SATURATION: 98.1 % (ref 92–98.5)
O2HB: 97.7 % (ref 94–97)
OPERATOR ID: ABNORMAL
PATIENT TEMP: 37 C
PCO2: 44 MMHG (ref 35–45)
PDW BLD-RTO: 14.2 FL (ref 11.5–15)
PEEP/CPAP: 5 CMH2O
PFO2: 2.46 MMHG/%
PH BLOOD GAS: 7.35 (ref 7.35–7.45)
PHOSPHORUS: 3.7 MG/DL (ref 2.5–4.5)
PLATELET # BLD: 85 E9/L (ref 130–450)
PLATELET CONFIRMATION: NORMAL
PMV BLD AUTO: 9.8 FL (ref 7–12)
PO2: 147.4 MMHG (ref 60–100)
POLYCHROMASIA: ABNORMAL
POTASSIUM SERPL-SCNC: 4.3 MMOL/L (ref 3.5–5)
POTASSIUM SERPL-SCNC: 4.3 MMOL/L (ref 3.5–5)
POTASSIUM SERPL-SCNC: 4.4 MMOL/L (ref 3.5–5)
POTASSIUM SERPL-SCNC: 4.5 MMOL/L (ref 3.5–5)
RBC # BLD: 3.52 E12/L (ref 3.8–5.8)
RI(T): 1.47
RR MECHANICAL: 14 B/MIN
SODIUM BLD-SCNC: 137 MMOL/L (ref 132–146)
SODIUM BLD-SCNC: 138 MMOL/L (ref 132–146)
SODIUM BLD-SCNC: 139 MMOL/L (ref 132–146)
SODIUM BLD-SCNC: 139 MMOL/L (ref 132–146)
SOURCE, BLOOD GAS: ABNORMAL
THB: 10.8 G/DL (ref 11.5–16.5)
TIME ANALYZED: 505
TOTAL CK: 5977 U/L (ref 20–200)
TOTAL CK: 6067 U/L (ref 20–200)
TOTAL CK: 6291 U/L (ref 20–200)
TOTAL CK: 6581 U/L (ref 20–200)
TOTAL PROTEIN: 5.2 G/DL (ref 6.4–8.3)
VT MECHANICAL: 500 ML
WBC # BLD: 12.9 E9/L (ref 4.5–11.5)

## 2019-06-13 PROCEDURE — 99254 IP/OBS CNSLTJ NEW/EST MOD 60: CPT | Performed by: ORTHOPAEDIC SURGERY

## 2019-06-13 PROCEDURE — 93005 ELECTROCARDIOGRAM TRACING: CPT | Performed by: STUDENT IN AN ORGANIZED HEALTH CARE EDUCATION/TRAINING PROGRAM

## 2019-06-13 PROCEDURE — 82330 ASSAY OF CALCIUM: CPT

## 2019-06-13 PROCEDURE — 94003 VENT MGMT INPAT SUBQ DAY: CPT

## 2019-06-13 PROCEDURE — 93010 ELECTROCARDIOGRAM REPORT: CPT | Performed by: INTERNAL MEDICINE

## 2019-06-13 PROCEDURE — 2500000003 HC RX 250 WO HCPCS: Performed by: STUDENT IN AN ORGANIZED HEALTH CARE EDUCATION/TRAINING PROGRAM

## 2019-06-13 PROCEDURE — 85018 HEMOGLOBIN: CPT

## 2019-06-13 PROCEDURE — 85014 HEMATOCRIT: CPT

## 2019-06-13 PROCEDURE — 2580000003 HC RX 258: Performed by: STUDENT IN AN ORGANIZED HEALTH CARE EDUCATION/TRAINING PROGRAM

## 2019-06-13 PROCEDURE — 99291 CRITICAL CARE FIRST HOUR: CPT | Performed by: SURGERY

## 2019-06-13 PROCEDURE — 80048 BASIC METABOLIC PNL TOTAL CA: CPT

## 2019-06-13 PROCEDURE — 36415 COLL VENOUS BLD VENIPUNCTURE: CPT

## 2019-06-13 PROCEDURE — 6360000002 HC RX W HCPCS: Performed by: STUDENT IN AN ORGANIZED HEALTH CARE EDUCATION/TRAINING PROGRAM

## 2019-06-13 PROCEDURE — 80053 COMPREHEN METABOLIC PANEL: CPT

## 2019-06-13 PROCEDURE — 6370000000 HC RX 637 (ALT 250 FOR IP): Performed by: STUDENT IN AN ORGANIZED HEALTH CARE EDUCATION/TRAINING PROGRAM

## 2019-06-13 PROCEDURE — 85025 COMPLETE CBC W/AUTO DIFF WBC: CPT

## 2019-06-13 PROCEDURE — 71045 X-RAY EXAM CHEST 1 VIEW: CPT

## 2019-06-13 PROCEDURE — 2000000000 HC ICU R&B

## 2019-06-13 PROCEDURE — 37799 UNLISTED PX VASCULAR SURGERY: CPT

## 2019-06-13 PROCEDURE — 99232 SBSQ HOSP IP/OBS MODERATE 35: CPT | Performed by: NEUROLOGICAL SURGERY

## 2019-06-13 PROCEDURE — 84100 ASSAY OF PHOSPHORUS: CPT

## 2019-06-13 PROCEDURE — 82805 BLOOD GASES W/O2 SATURATION: CPT

## 2019-06-13 PROCEDURE — C9113 INJ PANTOPRAZOLE SODIUM, VIA: HCPCS | Performed by: STUDENT IN AN ORGANIZED HEALTH CARE EDUCATION/TRAINING PROGRAM

## 2019-06-13 PROCEDURE — L0172 CERV COL SR FOAM 2PC PRE OTS: HCPCS

## 2019-06-13 PROCEDURE — 83605 ASSAY OF LACTIC ACID: CPT

## 2019-06-13 PROCEDURE — 93306 TTE W/DOPPLER COMPLETE: CPT

## 2019-06-13 PROCEDURE — 83735 ASSAY OF MAGNESIUM: CPT

## 2019-06-13 PROCEDURE — 82550 ASSAY OF CK (CPK): CPT

## 2019-06-13 RX ORDER — SODIUM CHLORIDE, SODIUM LACTATE, POTASSIUM CHLORIDE, AND CALCIUM CHLORIDE .6; .31; .03; .02 G/100ML; G/100ML; G/100ML; G/100ML
1000 INJECTION, SOLUTION INTRAVENOUS ONCE
Status: COMPLETED | OUTPATIENT
Start: 2019-06-13 | End: 2019-06-13

## 2019-06-13 RX ORDER — ACETAMINOPHEN 160 MG/5ML
650 SOLUTION ORAL EVERY 6 HOURS PRN
Status: DISCONTINUED | OUTPATIENT
Start: 2019-06-13 | End: 2019-06-28 | Stop reason: HOSPADM

## 2019-06-13 RX ORDER — MAGNESIUM SULFATE IN WATER 40 MG/ML
2 INJECTION, SOLUTION INTRAVENOUS ONCE
Status: COMPLETED | OUTPATIENT
Start: 2019-06-13 | End: 2019-06-13

## 2019-06-13 RX ADMIN — PROPOFOL 20 MCG/KG/MIN: 10 INJECTION, EMULSION INTRAVENOUS at 11:42

## 2019-06-13 RX ADMIN — SODIUM CHLORIDE, POTASSIUM CHLORIDE, SODIUM LACTATE AND CALCIUM CHLORIDE: 600; 310; 30; 20 INJECTION, SOLUTION INTRAVENOUS at 19:54

## 2019-06-13 RX ADMIN — METHOCARBAMOL 1000 MG: 100 INJECTION, SOLUTION INTRAMUSCULAR; INTRAVENOUS at 02:24

## 2019-06-13 RX ADMIN — PROPOFOL 20 MCG/KG/MIN: 10 INJECTION, EMULSION INTRAVENOUS at 21:47

## 2019-06-13 RX ADMIN — Medication 10 ML: at 09:27

## 2019-06-13 RX ADMIN — PANTOPRAZOLE SODIUM 40 MG: 40 INJECTION, POWDER, FOR SOLUTION INTRAVENOUS at 08:34

## 2019-06-13 RX ADMIN — METHOCARBAMOL 1000 MG: 100 INJECTION, SOLUTION INTRAMUSCULAR; INTRAVENOUS at 18:17

## 2019-06-13 RX ADMIN — SODIUM CHLORIDE, POTASSIUM CHLORIDE, SODIUM LACTATE AND CALCIUM CHLORIDE: 600; 310; 30; 20 INJECTION, SOLUTION INTRAVENOUS at 12:19

## 2019-06-13 RX ADMIN — SODIUM CHLORIDE, POTASSIUM CHLORIDE, SODIUM LACTATE AND CALCIUM CHLORIDE 1000 ML: 600; 310; 30; 20 INJECTION, SOLUTION INTRAVENOUS at 14:54

## 2019-06-13 RX ADMIN — PROPOFOL 25 MCG/KG/MIN: 10 INJECTION, EMULSION INTRAVENOUS at 01:12

## 2019-06-13 RX ADMIN — CHLORHEXIDINE GLUCONATE 0.12% ORAL RINSE 15 ML: 1.2 LIQUID ORAL at 21:30

## 2019-06-13 RX ADMIN — PROPOFOL 20 MCG/KG/MIN: 10 INJECTION, EMULSION INTRAVENOUS at 06:44

## 2019-06-13 RX ADMIN — METHOCARBAMOL 1000 MG: 100 INJECTION, SOLUTION INTRAMUSCULAR; INTRAVENOUS at 10:16

## 2019-06-13 RX ADMIN — SODIUM CHLORIDE, POTASSIUM CHLORIDE, SODIUM LACTATE AND CALCIUM CHLORIDE: 600; 310; 30; 20 INJECTION, SOLUTION INTRAVENOUS at 04:04

## 2019-06-13 RX ADMIN — MINERAL OIL AND WHITE PETROLATUM: 150; 830 OINTMENT OPHTHALMIC at 08:25

## 2019-06-13 RX ADMIN — Medication 10 ML: at 08:34

## 2019-06-13 RX ADMIN — PROPOFOL 20 MCG/KG/MIN: 10 INJECTION, EMULSION INTRAVENOUS at 16:47

## 2019-06-13 RX ADMIN — SODIUM CHLORIDE, POTASSIUM CHLORIDE, SODIUM LACTATE AND CALCIUM CHLORIDE 1000 ML: 600; 310; 30; 20 INJECTION, SOLUTION INTRAVENOUS at 07:22

## 2019-06-13 RX ADMIN — Medication 10 ML: at 21:29

## 2019-06-13 RX ADMIN — Medication 175 MCG/HR: at 12:18

## 2019-06-13 RX ADMIN — MAGNESIUM SULFATE 2 G: 2 INJECTION INTRAVENOUS at 07:58

## 2019-06-13 RX ADMIN — ACETAMINOPHEN ORAL SOLUTION 650 MG: 650 SOLUTION ORAL at 15:16

## 2019-06-13 RX ADMIN — Medication 175 MCG/HR: at 04:52

## 2019-06-13 RX ADMIN — CHLORHEXIDINE GLUCONATE 0.12% ORAL RINSE 15 ML: 1.2 LIQUID ORAL at 08:34

## 2019-06-13 ASSESSMENT — PULMONARY FUNCTION TESTS
PIF_VALUE: 40
PIF_VALUE: 32
PIF_VALUE: 33
PIF_VALUE: 32
PIF_VALUE: 33
PIF_VALUE: 38
PIF_VALUE: 33
PIF_VALUE: 33
PIF_VALUE: 28
PIF_VALUE: 28
PIF_VALUE: 35
PIF_VALUE: 35
PIF_VALUE: 37
PIF_VALUE: 32
PIF_VALUE: 32
PIF_VALUE: 39
PIF_VALUE: 31
PIF_VALUE: 36
PIF_VALUE: 35
PIF_VALUE: 32
PIF_VALUE: 30
PIF_VALUE: 36
PIF_VALUE: 33
PIF_VALUE: 37
PIF_VALUE: 30
PIF_VALUE: 31
PIF_VALUE: 31
PIF_VALUE: 34
PIF_VALUE: 30
PIF_VALUE: 30
PIF_VALUE: 34

## 2019-06-13 ASSESSMENT — PAIN SCALES - GENERAL
PAINLEVEL_OUTOF10: 0
PAINLEVEL_OUTOF10: 0
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
## 2019-06-13 NOTE — FLOWSHEET NOTE
Attempting to reach for et tube and og tube when awakened. Unable to verbally redirect. Soft restraints to right and left wrists continued.

## 2019-06-13 NOTE — FLOWSHEET NOTE
Patient has sedation and  Analgesia infusing . He reaches for the  ETT and attempts to pull it. Verbal direction has  Short term  Effect.   Will continue soft wrist restraints for  Safety  At present

## 2019-06-14 ENCOUNTER — APPOINTMENT (OUTPATIENT)
Dept: GENERAL RADIOLOGY | Age: 24
DRG: 003 | End: 2019-06-14
Payer: OTHER MISCELLANEOUS

## 2019-06-14 ENCOUNTER — ANESTHESIA EVENT (OUTPATIENT)
Dept: OPERATING ROOM | Age: 24
DRG: 003 | End: 2019-06-14
Payer: OTHER MISCELLANEOUS

## 2019-06-14 ENCOUNTER — ANESTHESIA (OUTPATIENT)
Dept: OPERATING ROOM | Age: 24
DRG: 003 | End: 2019-06-14
Payer: OTHER MISCELLANEOUS

## 2019-06-14 VITALS — RESPIRATION RATE: 8 BRPM | OXYGEN SATURATION: 100 %

## 2019-06-14 LAB
AADO2: 129.3 MMHG
AADO2: 143.9 MMHG
AADO2: 456.5 MMHG
ALBUMIN SERPL-MCNC: 2.9 G/DL (ref 3.5–5.2)
ALP BLD-CCNC: 167 U/L (ref 40–129)
ALT SERPL-CCNC: 2158 U/L (ref 0–40)
ANION GAP SERPL CALCULATED.3IONS-SCNC: 6 MMOL/L (ref 7–16)
ANION GAP SERPL CALCULATED.3IONS-SCNC: 6 MMOL/L (ref 7–16)
ANION GAP SERPL CALCULATED.3IONS-SCNC: 7 MMOL/L (ref 7–16)
ANISOCYTOSIS: ABNORMAL
AST SERPL-CCNC: 1148 U/L (ref 0–39)
B.E.: -0.9 MMOL/L (ref -3–3)
B.E.: 0.3 MMOL/L (ref -3–3)
B.E.: 0.9 MMOL/L (ref -3–3)
BASOPHILS ABSOLUTE: 0.05 E9/L (ref 0–0.2)
BASOPHILS RELATIVE PERCENT: 0.3 % (ref 0–2)
BILIRUB SERPL-MCNC: 1.2 MG/DL (ref 0–1.2)
BUN BLDV-MCNC: 12 MG/DL (ref 6–20)
BUN BLDV-MCNC: 13 MG/DL (ref 6–20)
BUN BLDV-MCNC: 15 MG/DL (ref 6–20)
CALCIUM IONIZED: 1.17 MMOL/L (ref 1.15–1.33)
CALCIUM SERPL-MCNC: 7.8 MG/DL (ref 8.6–10.2)
CALCIUM SERPL-MCNC: 7.9 MG/DL (ref 8.6–10.2)
CALCIUM SERPL-MCNC: 8.2 MG/DL (ref 8.6–10.2)
CHLORIDE BLD-SCNC: 101 MMOL/L (ref 98–107)
CHLORIDE BLD-SCNC: 101 MMOL/L (ref 98–107)
CHLORIDE BLD-SCNC: 102 MMOL/L (ref 98–107)
CO2: 27 MMOL/L (ref 22–29)
CO2: 28 MMOL/L (ref 22–29)
CO2: 29 MMOL/L (ref 22–29)
COHB: 0.3 % (ref 0–1.5)
COHB: 0.3 % (ref 0–1.5)
COHB: 0.6 % (ref 0–1.5)
CREAT SERPL-MCNC: 0.6 MG/DL (ref 0.7–1.2)
CREAT SERPL-MCNC: 0.7 MG/DL (ref 0.7–1.2)
CREAT SERPL-MCNC: 0.7 MG/DL (ref 0.7–1.2)
CRITICAL: ABNORMAL
DATE ANALYZED: ABNORMAL
DATE OF COLLECTION: ABNORMAL
EOSINOPHILS ABSOLUTE: 0.26 E9/L (ref 0.05–0.5)
EOSINOPHILS RELATIVE PERCENT: 1.7 % (ref 0–6)
FIO2: 100 %
FIO2: 40 %
FIO2: 40 %
GFR AFRICAN AMERICAN: >60
GFR NON-AFRICAN AMERICAN: >60 ML/MIN/1.73
GLUCOSE BLD-MCNC: 132 MG/DL (ref 74–99)
GLUCOSE BLD-MCNC: 137 MG/DL (ref 74–99)
GLUCOSE BLD-MCNC: 168 MG/DL (ref 74–99)
HCO3: 23.7 MMOL/L (ref 22–26)
HCO3: 26.4 MMOL/L (ref 22–26)
HCO3: 26.8 MMOL/L (ref 22–26)
HCT VFR BLD CALC: 20.5 % (ref 37–54)
HCT VFR BLD CALC: 22.6 % (ref 37–54)
HCT VFR BLD CALC: 25.9 % (ref 37–54)
HCT VFR BLD CALC: 26.6 % (ref 37–54)
HEMOGLOBIN: 6.9 G/DL (ref 12.5–16.5)
HEMOGLOBIN: 7.4 G/DL (ref 12.5–16.5)
HEMOGLOBIN: 8.6 G/DL (ref 12.5–16.5)
HEMOGLOBIN: 8.8 G/DL (ref 12.5–16.5)
HHB: 1.8 % (ref 0–5)
HHB: 4 % (ref 0–5)
HHB: 4.2 % (ref 0–5)
IMMATURE GRANULOCYTES #: 0.16 E9/L
IMMATURE GRANULOCYTES %: 1.1 % (ref 0–5)
LAB: ABNORMAL
LACTIC ACID: 1.3 MMOL/L (ref 0.5–2.2)
LACTIC ACID: 1.4 MMOL/L (ref 0.5–2.2)
LYMPHOCYTES ABSOLUTE: 1.28 E9/L (ref 1.5–4)
LYMPHOCYTES RELATIVE PERCENT: 8.5 % (ref 20–42)
Lab: ABNORMAL
MAGNESIUM: 2.2 MG/DL (ref 1.6–2.6)
MCH RBC QN AUTO: 28.6 PG (ref 26–35)
MCHC RBC AUTO-ENTMCNC: 33.2 % (ref 32–34.5)
MCV RBC AUTO: 86 FL (ref 80–99.9)
METHB: 0.3 % (ref 0–1.5)
METHB: 0.5 % (ref 0–1.5)
METHB: 0.5 % (ref 0–1.5)
MODE: AC
MONOCYTES ABSOLUTE: 1.42 E9/L (ref 0.1–0.95)
MONOCYTES RELATIVE PERCENT: 9.5 % (ref 2–12)
NEUTROPHILS ABSOLUTE: 11.81 E9/L (ref 1.8–7.3)
NEUTROPHILS RELATIVE PERCENT: 78.9 % (ref 43–80)
O2 CONTENT: 10.1 ML/DL
O2 SATURATION: 95.8 % (ref 92–98.5)
O2 SATURATION: 96 % (ref 92–98.5)
O2 SATURATION: 98.2 % (ref 92–98.5)
O2HB: 94.9 % (ref 94–97)
O2HB: 95.2 % (ref 94–97)
O2HB: 97.4 % (ref 94–97)
OPERATOR ID: 7874
OPERATOR ID: 7874
OPERATOR ID: ABNORMAL
PATIENT TEMP: 37 C
PCO2: 38.9 MMHG (ref 35–45)
PCO2: 49.8 MMHG (ref 35–45)
PCO2: 50.2 MMHG (ref 35–45)
PDW BLD-RTO: 14 FL (ref 11.5–15)
PEEP/CPAP: 10 CMH2O
PEEP/CPAP: 10 CMH2O
PEEP/CPAP: 5 CMH2O
PFO2: 1.81 MMHG/%
PFO2: 2.16 MMHG/%
PFO2: 2.21 MMHG/%
PH BLOOD GAS: 7.34 (ref 7.35–7.45)
PH BLOOD GAS: 7.35 (ref 7.35–7.45)
PH BLOOD GAS: 7.4 (ref 7.35–7.45)
PHOSPHORUS: 1.6 MG/DL (ref 2.5–4.5)
PHOSPHORUS: 1.8 MG/DL (ref 2.5–4.5)
PHOSPHORUS: 2 MG/DL (ref 2.5–4.5)
PLATELET # BLD: 86 E9/L (ref 130–450)
PLATELET CONFIRMATION: NORMAL
PMV BLD AUTO: 9.7 FL (ref 7–12)
PO2: 181.3 MMHG (ref 60–100)
PO2: 86.6 MMHG (ref 60–100)
PO2: 88.6 MMHG (ref 60–100)
POLYCHROMASIA: ABNORMAL
POTASSIUM SERPL-SCNC: 4.5 MMOL/L (ref 3.5–5)
POTASSIUM SERPL-SCNC: 4.5 MMOL/L (ref 3.5–5)
POTASSIUM SERPL-SCNC: 4.6 MMOL/L (ref 3.5–5)
RBC # BLD: 3.01 E12/L (ref 3.8–5.8)
RI(T): 1.46
RI(T): 166 %
RI(T): 2.52
RR MECHANICAL: 14 B/MIN
RR MECHANICAL: 16 B/MIN
RR MECHANICAL: 18 B/MIN
SODIUM BLD-SCNC: 134 MMOL/L (ref 132–146)
SODIUM BLD-SCNC: 136 MMOL/L (ref 132–146)
SODIUM BLD-SCNC: 137 MMOL/L (ref 132–146)
SOURCE, BLOOD GAS: ABNORMAL
THB: 7.4 G/DL (ref 11.5–16.5)
THB: 7.7 G/DL (ref 11.5–16.5)
THB: 9.9 G/DL (ref 11.5–16.5)
TIME ANALYZED: 1130
TIME ANALYZED: 1422
TIME ANALYZED: 454
TOTAL CK: 6596 U/L (ref 20–200)
TOTAL CK: 7140 U/L (ref 20–200)
TOTAL CK: 7195 U/L (ref 20–200)
TOTAL CK: 7599 U/L (ref 20–200)
TOTAL PROTEIN: 5.6 G/DL (ref 6.4–8.3)
VT MECHANICAL: 500 ML
WBC # BLD: 15 E9/L (ref 4.5–11.5)

## 2019-06-14 PROCEDURE — 83735 ASSAY OF MAGNESIUM: CPT

## 2019-06-14 PROCEDURE — 3209999900 FLUORO FOR SURGICAL PROCEDURES

## 2019-06-14 PROCEDURE — 36415 COLL VENOUS BLD VENIPUNCTURE: CPT

## 2019-06-14 PROCEDURE — 2720000010 HC SURG SUPPLY STERILE: Performed by: ORTHOPAEDIC SURGERY

## 2019-06-14 PROCEDURE — 2500000003 HC RX 250 WO HCPCS: Performed by: STUDENT IN AN ORGANIZED HEALTH CARE EDUCATION/TRAINING PROGRAM

## 2019-06-14 PROCEDURE — 82330 ASSAY OF CALCIUM: CPT

## 2019-06-14 PROCEDURE — 82550 ASSAY OF CK (CPK): CPT

## 2019-06-14 PROCEDURE — 94640 AIRWAY INHALATION TREATMENT: CPT

## 2019-06-14 PROCEDURE — 73552 X-RAY EXAM OF FEMUR 2/>: CPT

## 2019-06-14 PROCEDURE — P9016 RBC LEUKOCYTES REDUCED: HCPCS

## 2019-06-14 PROCEDURE — 94003 VENT MGMT INPAT SUBQ DAY: CPT

## 2019-06-14 PROCEDURE — 80053 COMPREHEN METABOLIC PANEL: CPT

## 2019-06-14 PROCEDURE — 99291 CRITICAL CARE FIRST HOUR: CPT | Performed by: SURGERY

## 2019-06-14 PROCEDURE — 3700000000 HC ANESTHESIA ATTENDED CARE: Performed by: ORTHOPAEDIC SURGERY

## 2019-06-14 PROCEDURE — 73502 X-RAY EXAM HIP UNI 2-3 VIEWS: CPT

## 2019-06-14 PROCEDURE — 71045 X-RAY EXAM CHEST 1 VIEW: CPT

## 2019-06-14 PROCEDURE — 6370000000 HC RX 637 (ALT 250 FOR IP): Performed by: STUDENT IN AN ORGANIZED HEALTH CARE EDUCATION/TRAINING PROGRAM

## 2019-06-14 PROCEDURE — 6360000002 HC RX W HCPCS: Performed by: NURSE ANESTHETIST, CERTIFIED REGISTERED

## 2019-06-14 PROCEDURE — C9113 INJ PANTOPRAZOLE SODIUM, VIA: HCPCS | Performed by: STUDENT IN AN ORGANIZED HEALTH CARE EDUCATION/TRAINING PROGRAM

## 2019-06-14 PROCEDURE — 2580000003 HC RX 258: Performed by: STUDENT IN AN ORGANIZED HEALTH CARE EDUCATION/TRAINING PROGRAM

## 2019-06-14 PROCEDURE — 20670 REMOVAL IMPLANT SUPERFICIAL: CPT | Performed by: ORTHOPAEDIC SURGERY

## 2019-06-14 PROCEDURE — 85025 COMPLETE CBC W/AUTO DIFF WBC: CPT

## 2019-06-14 PROCEDURE — 84100 ASSAY OF PHOSPHORUS: CPT

## 2019-06-14 PROCEDURE — C1713 ANCHOR/SCREW BN/BN,TIS/BN: HCPCS | Performed by: ORTHOPAEDIC SURGERY

## 2019-06-14 PROCEDURE — 6360000002 HC RX W HCPCS: Performed by: STUDENT IN AN ORGANIZED HEALTH CARE EDUCATION/TRAINING PROGRAM

## 2019-06-14 PROCEDURE — 36430 TRANSFUSION BLD/BLD COMPNT: CPT

## 2019-06-14 PROCEDURE — 2000000000 HC ICU R&B

## 2019-06-14 PROCEDURE — 85018 HEMOGLOBIN: CPT

## 2019-06-14 PROCEDURE — 83605 ASSAY OF LACTIC ACID: CPT

## 2019-06-14 PROCEDURE — 87186 SC STD MICRODIL/AGAR DIL: CPT

## 2019-06-14 PROCEDURE — 85014 HEMATOCRIT: CPT

## 2019-06-14 PROCEDURE — 27506 TREATMENT OF THIGH FRACTURE: CPT | Performed by: ORTHOPAEDIC SURGERY

## 2019-06-14 PROCEDURE — 87070 CULTURE OTHR SPECIMN AEROBIC: CPT

## 2019-06-14 PROCEDURE — 0QS906Z REPOSITION LEFT FEMORAL SHAFT WITH INTRAMEDULLARY INTERNAL FIXATION DEVICE, OPEN APPROACH: ICD-10-PCS | Performed by: ORTHOPAEDIC SURGERY

## 2019-06-14 PROCEDURE — 2500000003 HC RX 250 WO HCPCS: Performed by: NURSE ANESTHETIST, CERTIFIED REGISTERED

## 2019-06-14 PROCEDURE — 87206 SMEAR FLUORESCENT/ACID STAI: CPT

## 2019-06-14 PROCEDURE — 3600000015 HC SURGERY LEVEL 5 ADDTL 15MIN: Performed by: ORTHOPAEDIC SURGERY

## 2019-06-14 PROCEDURE — 73560 X-RAY EXAM OF KNEE 1 OR 2: CPT

## 2019-06-14 PROCEDURE — 80048 BASIC METABOLIC PNL TOTAL CA: CPT

## 2019-06-14 PROCEDURE — P9041 ALBUMIN (HUMAN),5%, 50ML: HCPCS | Performed by: NURSE ANESTHETIST, CERTIFIED REGISTERED

## 2019-06-14 PROCEDURE — 3700000001 HC ADD 15 MINUTES (ANESTHESIA): Performed by: ORTHOPAEDIC SURGERY

## 2019-06-14 PROCEDURE — 6370000000 HC RX 637 (ALT 250 FOR IP): Performed by: ORTHOPAEDIC SURGERY

## 2019-06-14 PROCEDURE — 94664 DEMO&/EVAL PT USE INHALER: CPT

## 2019-06-14 PROCEDURE — 2709999900 HC NON-CHARGEABLE SUPPLY: Performed by: ORTHOPAEDIC SURGERY

## 2019-06-14 PROCEDURE — 2780000010 HC IMPLANT OTHER: Performed by: ORTHOPAEDIC SURGERY

## 2019-06-14 PROCEDURE — 3600000005 HC SURGERY LEVEL 5 BASE: Performed by: ORTHOPAEDIC SURGERY

## 2019-06-14 PROCEDURE — 82805 BLOOD GASES W/O2 SATURATION: CPT

## 2019-06-14 DEVICE — IMPLANTABLE DEVICE: Type: IMPLANTABLE DEVICE | Site: FEMUR | Status: FUNCTIONAL

## 2019-06-14 DEVICE — SCREW BNE L38MM DIA5MM TIB LT GRN TI ST CANN LOK FULL THRD: Type: IMPLANTABLE DEVICE | Site: FEMUR | Status: FUNCTIONAL

## 2019-06-14 DEVICE — ENDCAP ORTH DIA12MM 0MM EXTN ST TI SPRL BLDE LOK T40: Type: IMPLANTABLE DEVICE | Site: FEMUR | Status: FUNCTIONAL

## 2019-06-14 DEVICE — SCREW BNE L58MM DIA5MM ST TIB LT GRN TI ST CANN LOK FULL: Type: IMPLANTABLE DEVICE | Site: FEMUR | Status: FUNCTIONAL

## 2019-06-14 RX ORDER — FENTANYL CITRATE 50 UG/ML
INJECTION, SOLUTION INTRAMUSCULAR; INTRAVENOUS PRN
Status: DISCONTINUED | OUTPATIENT
Start: 2019-06-14 | End: 2019-06-14 | Stop reason: SDUPTHER

## 2019-06-14 RX ORDER — MORPHINE SULFATE 10 MG/ML
INJECTION, SOLUTION INTRAMUSCULAR; INTRAVENOUS PRN
Status: DISCONTINUED | OUTPATIENT
Start: 2019-06-14 | End: 2019-06-14 | Stop reason: SDUPTHER

## 2019-06-14 RX ORDER — DEXAMETHASONE SODIUM PHOSPHATE 10 MG/ML
INJECTION INTRAMUSCULAR; INTRAVENOUS PRN
Status: DISCONTINUED | OUTPATIENT
Start: 2019-06-14 | End: 2019-06-14 | Stop reason: SDUPTHER

## 2019-06-14 RX ORDER — DIMETHICONE, OXYBENZONE, AND PADIMATE O 2; 2.5; 6.6 G/100G; G/100G; G/100G
STICK TOPICAL PRN
Status: DISCONTINUED | OUTPATIENT
Start: 2019-06-14 | End: 2019-06-28 | Stop reason: HOSPADM

## 2019-06-14 RX ORDER — ASPIRIN 81 MG/1
81 TABLET ORAL 2 TIMES DAILY
Qty: 56 TABLET | Refills: 0 | Status: SHIPPED | OUTPATIENT
Start: 2019-06-14 | End: 2019-08-27 | Stop reason: ALTCHOICE

## 2019-06-14 RX ORDER — DIAPER,BRIEF,INFANT-TODD,DISP
EACH MISCELLANEOUS PRN
Status: DISCONTINUED | OUTPATIENT
Start: 2019-06-14 | End: 2019-06-14 | Stop reason: ALTCHOICE

## 2019-06-14 RX ORDER — OXYCODONE HYDROCHLORIDE AND ACETAMINOPHEN 5; 325 MG/1; MG/1
1 TABLET ORAL EVERY 6 HOURS PRN
Qty: 28 TABLET | Refills: 0 | Status: SHIPPED | OUTPATIENT
Start: 2019-06-14 | End: 2019-06-28 | Stop reason: HOSPADM

## 2019-06-14 RX ORDER — VECURONIUM BROMIDE 1 MG/ML
INJECTION, POWDER, LYOPHILIZED, FOR SOLUTION INTRAVENOUS PRN
Status: DISCONTINUED | OUTPATIENT
Start: 2019-06-14 | End: 2019-06-14 | Stop reason: SDUPTHER

## 2019-06-14 RX ORDER — DIAPER,BRIEF,INFANT-TODD,DISP
EACH MISCELLANEOUS 3 TIMES DAILY
Status: DISCONTINUED | OUTPATIENT
Start: 2019-06-14 | End: 2019-06-28 | Stop reason: HOSPADM

## 2019-06-14 RX ORDER — IPRATROPIUM BROMIDE AND ALBUTEROL SULFATE 2.5; .5 MG/3ML; MG/3ML
1 SOLUTION RESPIRATORY (INHALATION)
Status: DISCONTINUED | OUTPATIENT
Start: 2019-06-14 | End: 2019-06-26

## 2019-06-14 RX ORDER — GLYCOPYRROLATE 1 MG/5 ML
SYRINGE (ML) INTRAVENOUS PRN
Status: DISCONTINUED | OUTPATIENT
Start: 2019-06-14 | End: 2019-06-14 | Stop reason: SDUPTHER

## 2019-06-14 RX ORDER — ONDANSETRON 2 MG/ML
INJECTION INTRAMUSCULAR; INTRAVENOUS PRN
Status: DISCONTINUED | OUTPATIENT
Start: 2019-06-14 | End: 2019-06-14 | Stop reason: SDUPTHER

## 2019-06-14 RX ORDER — SODIUM CHLORIDE, SODIUM LACTATE, POTASSIUM CHLORIDE, CALCIUM CHLORIDE 600; 310; 30; 20 MG/100ML; MG/100ML; MG/100ML; MG/100ML
1000 INJECTION, SOLUTION INTRAVENOUS ONCE
Status: COMPLETED | OUTPATIENT
Start: 2019-06-14 | End: 2019-06-14

## 2019-06-14 RX ORDER — NEOSTIGMINE METHYLSULFATE 1 MG/ML
INJECTION, SOLUTION INTRAVENOUS PRN
Status: DISCONTINUED | OUTPATIENT
Start: 2019-06-14 | End: 2019-06-14 | Stop reason: SDUPTHER

## 2019-06-14 RX ORDER — ALBUMIN, HUMAN INJ 5% 5 %
SOLUTION INTRAVENOUS PRN
Status: DISCONTINUED | OUTPATIENT
Start: 2019-06-14 | End: 2019-06-14 | Stop reason: SDUPTHER

## 2019-06-14 RX ORDER — PROPOFOL 10 MG/ML
INJECTION, EMULSION INTRAVENOUS PRN
Status: DISCONTINUED | OUTPATIENT
Start: 2019-06-14 | End: 2019-06-14 | Stop reason: SDUPTHER

## 2019-06-14 RX ORDER — 0.9 % SODIUM CHLORIDE 0.9 %
250 INTRAVENOUS SOLUTION INTRAVENOUS ONCE
Status: COMPLETED | OUTPATIENT
Start: 2019-06-14 | End: 2019-06-15

## 2019-06-14 RX ORDER — PROPOFOL 10 MG/ML
10 INJECTION, EMULSION INTRAVENOUS
Status: DISCONTINUED | OUTPATIENT
Start: 2019-06-14 | End: 2019-06-15

## 2019-06-14 RX ORDER — MIDAZOLAM HYDROCHLORIDE 1 MG/ML
INJECTION INTRAMUSCULAR; INTRAVENOUS PRN
Status: DISCONTINUED | OUTPATIENT
Start: 2019-06-14 | End: 2019-06-14 | Stop reason: SDUPTHER

## 2019-06-14 RX ADMIN — SODIUM PHOSPHATE, MONOBASIC, MONOHYDRATE 30 MMOL: 276; 142 INJECTION, SOLUTION INTRAVENOUS at 13:25

## 2019-06-14 RX ADMIN — PROPOFOL 20 MCG/KG/MIN: 10 INJECTION, EMULSION INTRAVENOUS at 21:37

## 2019-06-14 RX ADMIN — PROPOFOL 100 MG: 10 INJECTION, EMULSION INTRAVENOUS at 07:33

## 2019-06-14 RX ADMIN — AMPICILLIN SODIUM AND SULBACTAM SODIUM 3 G: 2; 1 INJECTION, POWDER, FOR SOLUTION INTRAMUSCULAR; INTRAVENOUS at 16:31

## 2019-06-14 RX ADMIN — SODIUM CHLORIDE, POTASSIUM CHLORIDE, SODIUM LACTATE AND CALCIUM CHLORIDE 1000 ML: 600; 310; 30; 20 INJECTION, SOLUTION INTRAVENOUS at 01:04

## 2019-06-14 RX ADMIN — PROPOFOL 50 MG: 10 INJECTION, EMULSION INTRAVENOUS at 10:02

## 2019-06-14 RX ADMIN — FENTANYL CITRATE 100 MCG: 50 INJECTION, SOLUTION INTRAMUSCULAR; INTRAVENOUS at 07:52

## 2019-06-14 RX ADMIN — DEXAMETHASONE SODIUM PHOSPHATE 10 MG: 10 INJECTION INTRAMUSCULAR; INTRAVENOUS at 07:41

## 2019-06-14 RX ADMIN — Medication 175 MCG/HR: at 02:23

## 2019-06-14 RX ADMIN — PROPOFOL 50 MG: 10 INJECTION, EMULSION INTRAVENOUS at 09:56

## 2019-06-14 RX ADMIN — SODIUM CHLORIDE, POTASSIUM CHLORIDE, SODIUM LACTATE AND CALCIUM CHLORIDE: 600; 310; 30; 20 INJECTION, SOLUTION INTRAVENOUS at 13:54

## 2019-06-14 RX ADMIN — PROPOFOL 20 MCG/KG/MIN: 10 INJECTION, EMULSION INTRAVENOUS at 13:54

## 2019-06-14 RX ADMIN — CHLORHEXIDINE GLUCONATE 0.12% ORAL RINSE 15 ML: 1.2 LIQUID ORAL at 11:04

## 2019-06-14 RX ADMIN — FENTANYL CITRATE 150 MCG: 50 INJECTION, SOLUTION INTRAMUSCULAR; INTRAVENOUS at 08:28

## 2019-06-14 RX ADMIN — PROPOFOL 20 MCG/KG/MIN: 10 INJECTION, EMULSION INTRAVENOUS at 10:42

## 2019-06-14 RX ADMIN — ALBUMIN (HUMAN) 500 ML: 12.5 INJECTION, SOLUTION INTRAVENOUS at 08:06

## 2019-06-14 RX ADMIN — METHOCARBAMOL 1000 MG: 100 INJECTION, SOLUTION INTRAMUSCULAR; INTRAVENOUS at 02:23

## 2019-06-14 RX ADMIN — AMPICILLIN SODIUM AND SULBACTAM SODIUM 3 G: 2; 1 INJECTION, POWDER, FOR SOLUTION INTRAMUSCULAR; INTRAVENOUS at 22:52

## 2019-06-14 RX ADMIN — IPRATROPIUM BROMIDE AND ALBUTEROL SULFATE 1 AMPULE: .5; 3 SOLUTION RESPIRATORY (INHALATION) at 19:10

## 2019-06-14 RX ADMIN — FENTANYL CITRATE 100 MCG: 50 INJECTION, SOLUTION INTRAMUSCULAR; INTRAVENOUS at 07:34

## 2019-06-14 RX ADMIN — FENTANYL CITRATE 100 MCG: 50 INJECTION, SOLUTION INTRAMUSCULAR; INTRAVENOUS at 09:39

## 2019-06-14 RX ADMIN — CHLORHEXIDINE GLUCONATE 0.12% ORAL RINSE 15 ML: 1.2 LIQUID ORAL at 21:35

## 2019-06-14 RX ADMIN — VECURONIUM BROMIDE FOR INJECTION 5 MG: 1 INJECTION, POWDER, LYOPHILIZED, FOR SOLUTION INTRAVENOUS at 08:20

## 2019-06-14 RX ADMIN — METHOCARBAMOL 1000 MG: 100 INJECTION, SOLUTION INTRAMUSCULAR; INTRAVENOUS at 10:29

## 2019-06-14 RX ADMIN — SODIUM CHLORIDE 250 ML: 9 INJECTION, SOLUTION INTRAVENOUS at 19:59

## 2019-06-14 RX ADMIN — MIDAZOLAM HYDROCHLORIDE 2 MG: 1 INJECTION, SOLUTION INTRAMUSCULAR; INTRAVENOUS at 07:31

## 2019-06-14 RX ADMIN — CEFAZOLIN SODIUM 3 G: 10 INJECTION, POWDER, FOR SOLUTION INTRAVENOUS at 07:31

## 2019-06-14 RX ADMIN — Medication 0.8 MG: at 09:38

## 2019-06-14 RX ADMIN — METHOCARBAMOL 1000 MG: 100 INJECTION, SOLUTION INTRAMUSCULAR; INTRAVENOUS at 19:19

## 2019-06-14 RX ADMIN — PROPOFOL 20 MCG/KG/MIN: 10 INJECTION, EMULSION INTRAVENOUS at 18:01

## 2019-06-14 RX ADMIN — IPRATROPIUM BROMIDE AND ALBUTEROL SULFATE 1 AMPULE: .5; 3 SOLUTION RESPIRATORY (INHALATION) at 15:50

## 2019-06-14 RX ADMIN — FENTANYL CITRATE 100 MCG: 50 INJECTION, SOLUTION INTRAMUSCULAR; INTRAVENOUS at 08:14

## 2019-06-14 RX ADMIN — FENTANYL CITRATE 50 MCG: 50 INJECTION, SOLUTION INTRAMUSCULAR; INTRAVENOUS at 07:59

## 2019-06-14 RX ADMIN — MORPHINE SULFATE 10 MG: 10 INJECTION INTRAVENOUS at 09:42

## 2019-06-14 RX ADMIN — AMPICILLIN SODIUM AND SULBACTAM SODIUM 3 G: 2; 1 INJECTION, POWDER, FOR SOLUTION INTRAMUSCULAR; INTRAVENOUS at 11:14

## 2019-06-14 RX ADMIN — BACITRACIN ZINC: 500 OINTMENT TOPICAL at 20:25

## 2019-06-14 RX ADMIN — PANTOPRAZOLE SODIUM 40 MG: 40 INJECTION, POWDER, FOR SOLUTION INTRAVENOUS at 11:03

## 2019-06-14 RX ADMIN — SODIUM CHLORIDE, POTASSIUM CHLORIDE, SODIUM LACTATE AND CALCIUM CHLORIDE: 600; 310; 30; 20 INJECTION, SOLUTION INTRAVENOUS at 09:37

## 2019-06-14 RX ADMIN — BACITRACIN ZINC: 500 OINTMENT TOPICAL at 13:28

## 2019-06-14 RX ADMIN — SODIUM PHOSPHATE, MONOBASIC, MONOHYDRATE 30 MMOL: 276; 142 INJECTION, SOLUTION INTRAVENOUS at 06:20

## 2019-06-14 RX ADMIN — SODIUM CHLORIDE, POTASSIUM CHLORIDE, SODIUM LACTATE AND CALCIUM CHLORIDE: 600; 310; 30; 20 INJECTION, SOLUTION INTRAVENOUS at 21:37

## 2019-06-14 RX ADMIN — PROPOFOL 20 MCG/KG/MIN: 10 INJECTION, EMULSION INTRAVENOUS at 02:21

## 2019-06-14 RX ADMIN — ONDANSETRON HYDROCHLORIDE 4 MG: 2 INJECTION, SOLUTION INTRAMUSCULAR; INTRAVENOUS at 07:41

## 2019-06-14 RX ADMIN — POTASSIUM PHOSPHATE, MONOBASIC AND POTASSIUM PHOSPHATE, DIBASIC 30 MMOL: 224; 236 INJECTION, SOLUTION, CONCENTRATE INTRAVENOUS at 20:25

## 2019-06-14 RX ADMIN — Medication: at 13:28

## 2019-06-14 RX ADMIN — PROPOFOL 150 MG: 10 INJECTION, EMULSION INTRAVENOUS at 07:46

## 2019-06-14 RX ADMIN — Medication 4 MG: at 09:38

## 2019-06-14 RX ADMIN — SODIUM CHLORIDE, POTASSIUM CHLORIDE, SODIUM LACTATE AND CALCIUM CHLORIDE: 600; 310; 30; 20 INJECTION, SOLUTION INTRAVENOUS at 02:25

## 2019-06-14 RX ADMIN — Medication 175 MCG/HR: at 13:16

## 2019-06-14 RX ADMIN — Medication 10 ML: at 11:17

## 2019-06-14 RX ADMIN — IPRATROPIUM BROMIDE AND ALBUTEROL SULFATE 1 AMPULE: .5; 3 SOLUTION RESPIRATORY (INHALATION) at 10:48

## 2019-06-14 RX ADMIN — VECURONIUM BROMIDE FOR INJECTION 10 MG: 1 INJECTION, POWDER, LYOPHILIZED, FOR SOLUTION INTRAVENOUS at 07:33

## 2019-06-14 RX ADMIN — Medication 175 MCG/HR: at 20:06

## 2019-06-14 ASSESSMENT — PULMONARY FUNCTION TESTS
PIF_VALUE: 40
PIF_VALUE: 38
PIF_VALUE: 38
PIF_VALUE: 41
PIF_VALUE: 38
PIF_VALUE: 33
PIF_VALUE: 39
PIF_VALUE: 38
PIF_VALUE: 31
PIF_VALUE: 42
PIF_VALUE: 35
PIF_VALUE: 37
PIF_VALUE: 27
PIF_VALUE: 36
PIF_VALUE: 43
PIF_VALUE: 42
PIF_VALUE: 25
PIF_VALUE: 39
PIF_VALUE: 41
PIF_VALUE: 41
PIF_VALUE: 38
PIF_VALUE: 39
PIF_VALUE: 39
PIF_VALUE: 38
PIF_VALUE: 40
PIF_VALUE: 38
PIF_VALUE: 39
PIF_VALUE: 43
PIF_VALUE: 43
PIF_VALUE: 39
PIF_VALUE: 35
PIF_VALUE: 33
PIF_VALUE: 39
PIF_VALUE: 41
PIF_VALUE: 42
PIF_VALUE: 38
PIF_VALUE: 38
PIF_VALUE: 39
PIF_VALUE: 38
PIF_VALUE: 37
PIF_VALUE: 39
PIF_VALUE: 36
PIF_VALUE: 36
PIF_VALUE: 39
PIF_VALUE: 43
PIF_VALUE: 38
PIF_VALUE: 39
PIF_VALUE: 40
PIF_VALUE: 41
PIF_VALUE: 39
PIF_VALUE: 41
PIF_VALUE: 43
PIF_VALUE: 38
PIF_VALUE: 38
PIF_VALUE: 43
PIF_VALUE: 36
PIF_VALUE: 38
PIF_VALUE: 38
PIF_VALUE: 36
PIF_VALUE: 43
PIF_VALUE: 38
PIF_VALUE: 34
PIF_VALUE: 37
PIF_VALUE: 39
PIF_VALUE: 38
PIF_VALUE: 41
PIF_VALUE: 36
PIF_VALUE: 39
PIF_VALUE: 41
PIF_VALUE: 36
PIF_VALUE: 40
PIF_VALUE: 38
PIF_VALUE: 42
PIF_VALUE: 25
PIF_VALUE: 40
PIF_VALUE: 38
PIF_VALUE: 42
PIF_VALUE: 41
PIF_VALUE: 34
PIF_VALUE: 46
PIF_VALUE: 37
PIF_VALUE: 38
PIF_VALUE: 40
PIF_VALUE: 32
PIF_VALUE: 39
PIF_VALUE: 25
PIF_VALUE: 39
PIF_VALUE: 38
PIF_VALUE: 37
PIF_VALUE: 37
PIF_VALUE: 38
PIF_VALUE: 38
PIF_VALUE: 30
PIF_VALUE: 38
PIF_VALUE: 38
PIF_VALUE: 28
PIF_VALUE: 38
PIF_VALUE: 25
PIF_VALUE: 40
PIF_VALUE: 36
PIF_VALUE: 37
PIF_VALUE: 40
PIF_VALUE: 39
PIF_VALUE: 36
PIF_VALUE: 38
PIF_VALUE: 36
PIF_VALUE: 37
PIF_VALUE: 27
PIF_VALUE: 38
PIF_VALUE: 40
PIF_VALUE: 38
PIF_VALUE: 1
PIF_VALUE: 31
PIF_VALUE: 38
PIF_VALUE: 39
PIF_VALUE: 40
PIF_VALUE: 29
PIF_VALUE: 41
PIF_VALUE: 38
PIF_VALUE: 43
PIF_VALUE: 38
PIF_VALUE: 36
PIF_VALUE: 38
PIF_VALUE: 38
PIF_VALUE: 42
PIF_VALUE: 41
PIF_VALUE: 40
PIF_VALUE: 44
PIF_VALUE: 34
PIF_VALUE: 35
PIF_VALUE: 41
PIF_VALUE: 25
PIF_VALUE: 40
PIF_VALUE: 38
PIF_VALUE: 41
PIF_VALUE: 31
PIF_VALUE: 33
PIF_VALUE: 34
PIF_VALUE: 49
PIF_VALUE: 40
PIF_VALUE: 35
PIF_VALUE: 38
PIF_VALUE: 40
PIF_VALUE: 38
PIF_VALUE: 36
PIF_VALUE: 38
PIF_VALUE: 38
PIF_VALUE: 36
PIF_VALUE: 38
PIF_VALUE: 38

## 2019-06-14 ASSESSMENT — PAIN SCALES - GENERAL
PAINLEVEL_OUTOF10: 0

## 2019-06-14 NOTE — PROGRESS NOTES
Calories: Propofol @22.2ml/hr providing an additional 586kcals   · Anthropometric Measures:  · Ht: 6' (182.9 cm)   · Current Body Wt: 407 lb (184.6 kg)(6/14 actual)  · Admission Body Wt: 339 lb (153.8 kg)(6/12 actual)  · Usual Body Wt: (UTO)  · Weight Change: noted wt gain since admit, current fluids + at this time.   unable to assess overall wt hx at this time    · Ideal Body Wt: 178 lb (80.7 kg), % Ideal Body 190%(using admit wt )  · BMI Classification: BMI > or equal to 40.0 Obese Class III    Nutrition Interventions:   Continued Inpatient Monitoring, Education not appropriate at this time, Coordination of Care    Nutrition Evaluation:   · Evaluation: Goals set   · Goals: Nutrition progression    · Monitoring: Nutrition Progression, Skin Integrity, Wound Healing, I&O, Monitor Hemodynamic Status, Mental Status/Confusion, Weight, Pertinent Labs, Monitor Bowel Function      Electronically signed by Holly Chadwick MS, RD, LD on 6/14/19 at 12:39 PM  Contact Number: [phone]

## 2019-06-14 NOTE — BRIEF OP NOTE
Brief Postoperative Note  ______________________________________________________________    Patient: Shonda Zuniga  YOB: 1995  MRN: [id number]  Date of Procedure: 6/14/2019    Pre-Op Diagnosis: /    Post-Op Diagnosis: Same       Procedure(s):  INTRAMEDULLARY NAIL FEMUR    Anesthesia: General    Surgeon(s):  Edelmira Pandey MD    Assistant: Lucie Nuñez    Estimated Blood Loss (mL): 285     Complications: None    Specimens:   * No specimens in log *    Implants:  Implant Name Type Inv. Item Serial No.  Lot No. LRB No. Used   NAIL FEM WILLOW RETRO [id number] Screw/Plate/Nail/Jeet NAIL FEM WILLOW RETRO [id number]  [employer] [id number] Left 1   IMPL BLADE SPIRAL NL RETRGRD FEM ST TI 80MM Screw/Plate/Nail/Jeet IMPL BLADE SPIRAL NL RETRGRD FEM ST TI 80MM  [employer]  Left 1   SCREW LK STARDRIVE 73GC Screw/Plate/Nail/Jeet SCREW LK STARDRIVE 35KL  [place]  Left 1   CAP END NL FEM T40 STARDRIVE ST 0MM Screw/Plate/Nail/Jeet CAP END NL FEM T40 STARDRIVE ST 0MM  [employer]  Left 1   SCREW LK W/ T25 STARDRIVE [id number] Screw/Plate/Nail/Jeet SCREW LK W/ T25 STARDRIVE [id number]  [employer]  Left 1         Drains:   NG/OG/NJ/NE Tube Orogastric 16 fr (Active)   Surrounding Skin Dry; Intact 6/13/2019  8:00 PM   Status Suction-low intermittent 6/14/2019 12:00 AM   Drainage Appearance Bile 6/14/2019 12:00 AM   Output (mL) 300 ml 6/14/2019  6:00 AM       Urethral Catheter Non-latex (Active)   $ Urethral catheter insertion $ Not inserted for procedure 6/12/2019  1:10 AM   Catheter Indications Need for fluid management in critically ill patients in a critical care setting not able to be managed by other means such as BSC with hat, bedpan, urinal, condom catheter, or short term intermittent urethral catherization 6/14/2019  6:00 AM   Securement Device Date Changed 06/12/19 6/12/2019  4:00 PM   Site Assessment No urethral drainage 6/14/2019  6:00 AM   Urine Color Maria A 6/14/2019  6:00 AM   Urine Appearance Sediment 6/14/2019  6:00 AM   Output (mL) 75 mL 6/14/2019  6:54 AM   Provider Notified to Remove Yes 6/14/2019  1:00 AM           Raysa Avila DO  Date: 6/14/2019  Time: 9:58 AM
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
## 2019-06-14 NOTE — FLOWSHEET NOTE
Patient continues to grab ETT/og. Poor safety awareness. Unable to redirect at this time. Bilateral soft wrist restraints started for patient safety.
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
## 2019-06-15 ENCOUNTER — APPOINTMENT (OUTPATIENT)
Dept: GENERAL RADIOLOGY | Age: 24
DRG: 003 | End: 2019-06-15
Payer: OTHER MISCELLANEOUS

## 2019-06-15 LAB
AADO2: 144.6 MMHG
ABO/RH: NORMAL
ALBUMIN SERPL-MCNC: 2.4 G/DL (ref 3.5–5.2)
ALP BLD-CCNC: 116 U/L (ref 40–129)
ALT SERPL-CCNC: 1296 U/L (ref 0–40)
ANION GAP SERPL CALCULATED.3IONS-SCNC: 7 MMOL/L (ref 7–16)
ANISOCYTOSIS: ABNORMAL
ANTIBODY SCREEN: NORMAL
AST SERPL-CCNC: 618 U/L (ref 0–39)
B.E.: 3.3 MMOL/L (ref -3–3)
BASOPHILS ABSOLUTE: 0 E9/L (ref 0–0.2)
BASOPHILS RELATIVE PERCENT: 0.1 % (ref 0–2)
BILIRUB SERPL-MCNC: 0.8 MG/DL (ref 0–1.2)
BLOOD BANK DISPENSE STATUS: NORMAL
BLOOD BANK PRODUCT CODE: NORMAL
BPU ID: NORMAL
BUN BLDV-MCNC: 12 MG/DL (ref 6–20)
CALCIUM IONIZED: 1.15 MMOL/L (ref 1.15–1.33)
CALCIUM SERPL-MCNC: 7.9 MG/DL (ref 8.6–10.2)
CHLORIDE BLD-SCNC: 102 MMOL/L (ref 98–107)
CO2: 29 MMOL/L (ref 22–29)
COHB: 1.5 % (ref 0–1.5)
CREAT SERPL-MCNC: 0.6 MG/DL (ref 0.7–1.2)
CRITICAL: ABNORMAL
DATE ANALYZED: ABNORMAL
DATE OF COLLECTION: ABNORMAL
DESCRIPTION BLOOD BANK: NORMAL
EOSINOPHILS ABSOLUTE: 0 E9/L (ref 0.05–0.5)
EOSINOPHILS RELATIVE PERCENT: 0 % (ref 0–6)
FIO2: 40 %
GFR AFRICAN AMERICAN: >60
GFR NON-AFRICAN AMERICAN: >60 ML/MIN/1.73
GLUCOSE BLD-MCNC: 124 MG/DL (ref 74–99)
HCO3: 27.1 MMOL/L (ref 22–26)
HCT VFR BLD CALC: 18 % (ref 37–54)
HCT VFR BLD CALC: 18.3 % (ref 37–54)
HCT VFR BLD CALC: 20.5 % (ref 37–54)
HCT VFR BLD CALC: 20.8 % (ref 37–54)
HCT VFR BLD CALC: 21.6 % (ref 37–54)
HEMOGLOBIN: 6 G/DL (ref 12.5–16.5)
HEMOGLOBIN: 6.2 G/DL (ref 12.5–16.5)
HEMOGLOBIN: 6.8 G/DL (ref 12.5–16.5)
HEMOGLOBIN: 7 G/DL (ref 12.5–16.5)
HEMOGLOBIN: 7.2 G/DL (ref 12.5–16.5)
HHB: 3.9 % (ref 0–5)
LAB: ABNORMAL
LYMPHOCYTES ABSOLUTE: 1.34 E9/L (ref 1.5–4)
LYMPHOCYTES RELATIVE PERCENT: 9.6 % (ref 20–42)
Lab: ABNORMAL
MAGNESIUM: 2.1 MG/DL (ref 1.6–2.6)
MCH RBC QN AUTO: 27.9 PG (ref 26–35)
MCHC RBC AUTO-ENTMCNC: 33.3 % (ref 32–34.5)
MCV RBC AUTO: 83.7 FL (ref 80–99.9)
METHB: 0.6 % (ref 0–1.5)
MODE: AC
MONOCYTES ABSOLUTE: 0.4 E9/L (ref 0.1–0.95)
MONOCYTES RELATIVE PERCENT: 2.6 % (ref 2–12)
NEUTROPHILS ABSOLUTE: 11.79 E9/L (ref 1.8–7.3)
NEUTROPHILS RELATIVE PERCENT: 87.8 % (ref 43–80)
O2 SATURATION: 96 % (ref 92–98.5)
O2HB: 94 % (ref 94–97)
OPERATOR ID: 2464
PATIENT TEMP: 37 C
PCO2: 37.2 MMHG (ref 35–45)
PDW BLD-RTO: 14.6 FL (ref 11.5–15)
PEEP/CPAP: 10 CMH2O
PFO2: 2.2 MMHG/%
PH BLOOD GAS: 7.48 (ref 7.35–7.45)
PHOSPHORUS: 1.9 MG/DL (ref 2.5–4.5)
PHOSPHORUS: 1.9 MG/DL (ref 2.5–4.5)
PHOSPHORUS: 2 MG/DL (ref 2.5–4.5)
PHOSPHORUS: 2.1 MG/DL (ref 2.5–4.5)
PLATELET # BLD: 86 E9/L (ref 130–450)
PLATELET CONFIRMATION: NORMAL
PMV BLD AUTO: 10 FL (ref 7–12)
PO2: 87.8 MMHG (ref 60–100)
POLYCHROMASIA: ABNORMAL
POTASSIUM SERPL-SCNC: 4.1 MMOL/L (ref 3.5–5)
RBC # BLD: 2.15 E12/L (ref 3.8–5.8)
RI(T): 1.65
RR MECHANICAL: 18 B/MIN
SODIUM BLD-SCNC: 138 MMOL/L (ref 132–146)
SOURCE, BLOOD GAS: ABNORMAL
THB: 6.9 G/DL (ref 11.5–16.5)
TIME ANALYZED: 428
TOTAL CK: 5723 U/L (ref 20–200)
TOTAL PROTEIN: 4.8 G/DL (ref 6.4–8.3)
VT MECHANICAL: 500 ML
WBC # BLD: 13.4 E9/L (ref 4.5–11.5)

## 2019-06-15 PROCEDURE — 82550 ASSAY OF CK (CPK): CPT

## 2019-06-15 PROCEDURE — P9016 RBC LEUKOCYTES REDUCED: HCPCS

## 2019-06-15 PROCEDURE — 2500000003 HC RX 250 WO HCPCS: Performed by: SURGERY

## 2019-06-15 PROCEDURE — 86901 BLOOD TYPING SEROLOGIC RH(D): CPT

## 2019-06-15 PROCEDURE — 6360000002 HC RX W HCPCS: Performed by: STUDENT IN AN ORGANIZED HEALTH CARE EDUCATION/TRAINING PROGRAM

## 2019-06-15 PROCEDURE — 2580000003 HC RX 258: Performed by: STUDENT IN AN ORGANIZED HEALTH CARE EDUCATION/TRAINING PROGRAM

## 2019-06-15 PROCEDURE — 71045 X-RAY EXAM CHEST 1 VIEW: CPT

## 2019-06-15 PROCEDURE — 36415 COLL VENOUS BLD VENIPUNCTURE: CPT

## 2019-06-15 PROCEDURE — 99291 CRITICAL CARE FIRST HOUR: CPT | Performed by: SURGERY

## 2019-06-15 PROCEDURE — 6370000000 HC RX 637 (ALT 250 FOR IP): Performed by: STUDENT IN AN ORGANIZED HEALTH CARE EDUCATION/TRAINING PROGRAM

## 2019-06-15 PROCEDURE — 2500000003 HC RX 250 WO HCPCS: Performed by: STUDENT IN AN ORGANIZED HEALTH CARE EDUCATION/TRAINING PROGRAM

## 2019-06-15 PROCEDURE — 36430 TRANSFUSION BLD/BLD COMPNT: CPT | Performed by: NURSE PRACTITIONER

## 2019-06-15 PROCEDURE — 85018 HEMOGLOBIN: CPT

## 2019-06-15 PROCEDURE — 86850 RBC ANTIBODY SCREEN: CPT

## 2019-06-15 PROCEDURE — 2580000003 HC RX 258

## 2019-06-15 PROCEDURE — 83735 ASSAY OF MAGNESIUM: CPT

## 2019-06-15 PROCEDURE — 6360000002 HC RX W HCPCS: Performed by: SURGERY

## 2019-06-15 PROCEDURE — 85014 HEMATOCRIT: CPT

## 2019-06-15 PROCEDURE — 84100 ASSAY OF PHOSPHORUS: CPT

## 2019-06-15 PROCEDURE — 86900 BLOOD TYPING SEROLOGIC ABO: CPT

## 2019-06-15 PROCEDURE — 2000000000 HC ICU R&B

## 2019-06-15 PROCEDURE — 82805 BLOOD GASES W/O2 SATURATION: CPT

## 2019-06-15 PROCEDURE — 2580000003 HC RX 258: Performed by: SURGERY

## 2019-06-15 PROCEDURE — C9113 INJ PANTOPRAZOLE SODIUM, VIA: HCPCS | Performed by: STUDENT IN AN ORGANIZED HEALTH CARE EDUCATION/TRAINING PROGRAM

## 2019-06-15 PROCEDURE — 86923 COMPATIBILITY TEST ELECTRIC: CPT

## 2019-06-15 PROCEDURE — 94003 VENT MGMT INPAT SUBQ DAY: CPT

## 2019-06-15 PROCEDURE — 85025 COMPLETE CBC W/AUTO DIFF WBC: CPT

## 2019-06-15 PROCEDURE — 82330 ASSAY OF CALCIUM: CPT

## 2019-06-15 PROCEDURE — 94640 AIRWAY INHALATION TREATMENT: CPT

## 2019-06-15 PROCEDURE — 80053 COMPREHEN METABOLIC PANEL: CPT

## 2019-06-15 RX ORDER — 0.9 % SODIUM CHLORIDE 0.9 %
250 INTRAVENOUS SOLUTION INTRAVENOUS ONCE
Status: DISCONTINUED | OUTPATIENT
Start: 2019-06-15 | End: 2019-06-17

## 2019-06-15 RX ORDER — FENTANYL CITRATE 50 UG/ML
50 INJECTION, SOLUTION INTRAMUSCULAR; INTRAVENOUS
Status: DISCONTINUED | OUTPATIENT
Start: 2019-06-15 | End: 2019-06-16

## 2019-06-15 RX ORDER — DOCUSATE SODIUM 50 MG/5ML
100 LIQUID ORAL 2 TIMES DAILY
Status: DISCONTINUED | OUTPATIENT
Start: 2019-06-15 | End: 2019-06-28 | Stop reason: HOSPADM

## 2019-06-15 RX ORDER — OXYCODONE HCL 5 MG/5 ML
10 SOLUTION, ORAL ORAL EVERY 6 HOURS
Status: DISCONTINUED | OUTPATIENT
Start: 2019-06-15 | End: 2019-06-20

## 2019-06-15 RX ORDER — SODIUM CHLORIDE 9 MG/ML
INJECTION, SOLUTION INTRAVENOUS
Status: COMPLETED
Start: 2019-06-15 | End: 2019-06-15

## 2019-06-15 RX ADMIN — METHOCARBAMOL 1000 MG: 100 INJECTION, SOLUTION INTRAMUSCULAR; INTRAVENOUS at 10:23

## 2019-06-15 RX ADMIN — METHOCARBAMOL 1000 MG: 100 INJECTION, SOLUTION INTRAMUSCULAR; INTRAVENOUS at 02:12

## 2019-06-15 RX ADMIN — SODIUM CHLORIDE 0.2 MCG/KG/HR: 9 INJECTION, SOLUTION INTRAVENOUS at 16:43

## 2019-06-15 RX ADMIN — SODIUM CHLORIDE, POTASSIUM CHLORIDE, SODIUM LACTATE AND CALCIUM CHLORIDE: 600; 310; 30; 20 INJECTION, SOLUTION INTRAVENOUS at 22:58

## 2019-06-15 RX ADMIN — Medication 175 MCG/HR: at 03:58

## 2019-06-15 RX ADMIN — PROPOFOL 30 MCG/KG/MIN: 10 INJECTION, EMULSION INTRAVENOUS at 14:55

## 2019-06-15 RX ADMIN — SENNOSIDES 5 ML: 8.8 SYRUP ORAL at 21:03

## 2019-06-15 RX ADMIN — DOCUSATE SODIUM 100 MG: 50 LIQUID ORAL at 20:53

## 2019-06-15 RX ADMIN — OXYCODONE HYDROCHLORIDE 10 MG: 5 SOLUTION ORAL at 15:08

## 2019-06-15 RX ADMIN — ACETAMINOPHEN ORAL SOLUTION 650 MG: 650 SOLUTION ORAL at 20:52

## 2019-06-15 RX ADMIN — BACITRACIN ZINC: 500 OINTMENT TOPICAL at 08:37

## 2019-06-15 RX ADMIN — OXYCODONE HYDROCHLORIDE 10 MG: 5 SOLUTION ORAL at 21:03

## 2019-06-15 RX ADMIN — PROPOFOL 20 MCG/KG/MIN: 10 INJECTION, EMULSION INTRAVENOUS at 05:03

## 2019-06-15 RX ADMIN — SODIUM CHLORIDE, POTASSIUM CHLORIDE, SODIUM LACTATE AND CALCIUM CHLORIDE: 600; 310; 30; 20 INJECTION, SOLUTION INTRAVENOUS at 03:58

## 2019-06-15 RX ADMIN — Medication 10 ML: at 12:00

## 2019-06-15 RX ADMIN — Medication 10 ML: at 08:37

## 2019-06-15 RX ADMIN — IPRATROPIUM BROMIDE AND ALBUTEROL SULFATE 1 AMPULE: .5; 3 SOLUTION RESPIRATORY (INHALATION) at 08:18

## 2019-06-15 RX ADMIN — AMPICILLIN SODIUM AND SULBACTAM SODIUM 3 G: 2; 1 INJECTION, POWDER, FOR SOLUTION INTRAMUSCULAR; INTRAVENOUS at 22:44

## 2019-06-15 RX ADMIN — CHLORHEXIDINE GLUCONATE 0.12% ORAL RINSE 15 ML: 1.2 LIQUID ORAL at 08:37

## 2019-06-15 RX ADMIN — AMPICILLIN SODIUM AND SULBACTAM SODIUM 3 G: 2; 1 INJECTION, POWDER, FOR SOLUTION INTRAMUSCULAR; INTRAVENOUS at 11:03

## 2019-06-15 RX ADMIN — IPRATROPIUM BROMIDE AND ALBUTEROL SULFATE 1 AMPULE: .5; 3 SOLUTION RESPIRATORY (INHALATION) at 19:41

## 2019-06-15 RX ADMIN — OXYCODONE HYDROCHLORIDE 10 MG: 5 SOLUTION ORAL at 09:47

## 2019-06-15 RX ADMIN — CHLORHEXIDINE GLUCONATE 0.12% ORAL RINSE 15 ML: 1.2 LIQUID ORAL at 20:53

## 2019-06-15 RX ADMIN — FENTANYL CITRATE 50 MCG: 50 INJECTION, SOLUTION INTRAMUSCULAR; INTRAVENOUS at 20:48

## 2019-06-15 RX ADMIN — DOCUSATE SODIUM 100 MG: 50 LIQUID ORAL at 09:08

## 2019-06-15 RX ADMIN — BACITRACIN ZINC: 500 OINTMENT TOPICAL at 13:44

## 2019-06-15 RX ADMIN — POTASSIUM PHOSPHATE, MONOBASIC AND POTASSIUM PHOSPHATE, DIBASIC 30 MMOL: 224; 236 INJECTION, SOLUTION, CONCENTRATE INTRAVENOUS at 07:11

## 2019-06-15 RX ADMIN — IPRATROPIUM BROMIDE AND ALBUTEROL SULFATE 1 AMPULE: .5; 3 SOLUTION RESPIRATORY (INHALATION) at 11:30

## 2019-06-15 RX ADMIN — PANTOPRAZOLE SODIUM 40 MG: 40 INJECTION, POWDER, FOR SOLUTION INTRAVENOUS at 08:37

## 2019-06-15 RX ADMIN — ENOXAPARIN SODIUM 40 MG: 40 INJECTION SUBCUTANEOUS at 22:50

## 2019-06-15 RX ADMIN — PROPOFOL 30 MCG/KG/MIN: 10 INJECTION, EMULSION INTRAVENOUS at 11:37

## 2019-06-15 RX ADMIN — METHOCARBAMOL 1000 MG: 100 INJECTION, SOLUTION INTRAMUSCULAR; INTRAVENOUS at 19:00

## 2019-06-15 RX ADMIN — Medication 10 ML: at 20:50

## 2019-06-15 RX ADMIN — BACITRACIN ZINC: 500 OINTMENT TOPICAL at 20:50

## 2019-06-15 RX ADMIN — SODIUM CHLORIDE 250 ML: 9 INJECTION, SOLUTION INTRAVENOUS at 16:47

## 2019-06-15 RX ADMIN — IPRATROPIUM BROMIDE AND ALBUTEROL SULFATE 1 AMPULE: .5; 3 SOLUTION RESPIRATORY (INHALATION) at 17:12

## 2019-06-15 RX ADMIN — SODIUM CHLORIDE 0.6 MCG/KG/HR: 9 INJECTION, SOLUTION INTRAVENOUS at 21:55

## 2019-06-15 RX ADMIN — Medication 175 MCG/HR: at 08:36

## 2019-06-15 RX ADMIN — AMPICILLIN SODIUM AND SULBACTAM SODIUM 3 G: 2; 1 INJECTION, POWDER, FOR SOLUTION INTRAMUSCULAR; INTRAVENOUS at 16:51

## 2019-06-15 RX ADMIN — PROPOFOL 20 MCG/KG/MIN: 10 INJECTION, EMULSION INTRAVENOUS at 09:10

## 2019-06-15 RX ADMIN — AMPICILLIN SODIUM AND SULBACTAM SODIUM 3 G: 2; 1 INJECTION, POWDER, FOR SOLUTION INTRAMUSCULAR; INTRAVENOUS at 04:29

## 2019-06-15 RX ADMIN — PROPOFOL 20 MCG/KG/MIN: 10 INJECTION, EMULSION INTRAVENOUS at 00:52

## 2019-06-15 ASSESSMENT — PULMONARY FUNCTION TESTS
PIF_VALUE: 29
PIF_VALUE: 32
PIF_VALUE: 29
PIF_VALUE: 32
PIF_VALUE: 28
PIF_VALUE: 30
PIF_VALUE: 29
PIF_VALUE: 28
PIF_VALUE: 29
PIF_VALUE: 28
PIF_VALUE: 32
PIF_VALUE: 32
PIF_VALUE: 27
PIF_VALUE: 32
PIF_VALUE: 27
PIF_VALUE: 29
PIF_VALUE: 29
PIF_VALUE: 30
PIF_VALUE: 29
PIF_VALUE: 28
PIF_VALUE: 30
PIF_VALUE: 27
PIF_VALUE: 28
PIF_VALUE: 26
PIF_VALUE: 28
PIF_VALUE: 51
PIF_VALUE: 44
PIF_VALUE: 29
PIF_VALUE: 33
PIF_VALUE: 27

## 2019-06-15 ASSESSMENT — PAIN SCALES - GENERAL
PAINLEVEL_OUTOF10: 0
PAINLEVEL_OUTOF10: 6
PAINLEVEL_OUTOF10: 6
PAINLEVEL_OUTOF10: 0
PAINLEVEL_OUTOF10: 0
PAINLEVEL_OUTOF10: 6
PAINLEVEL_OUTOF10: 0

## 2019-06-15 NOTE — PROGRESS NOTES
-  continue    CARDIOVASCULAR:  PROBLEMS:  1. Cardiac contusion  PLAN:  1. hemodynamic monitoring -  noninvasive -  continue  2. Monitor Trop--downtrending  3. Echo--normal  4. Repeat EKG in AM--RBBB  5. Will need BB if tachycardia does not resolve within the next few days, as long as SBP can tolerate    PULMONARY:  PROBLEMS:  1. acute respiratory failure -  hypoxemic  2. Bilateral pulmonary contusions  3. Bilateral rib fractures  PLAN:  1. ABG  2. CXR  3. mechanical ventilation -  continue  4. hyperinflation therapy -  continue  5. bronchopulmonary hygiene -  continue  6. bronchodilators -  continue    RENAL/FLUID/ELECTROLYTE:  PROBLEMS:  1. hypocalcemia, metabolic acidosis--resolving  2. Lactic acidosis--resolving  3. Rhabdomyolysis--CK continue to rise  PLAN:  1. radford catheter -  continue  2. avoid nephrotoxins  3. replace electrolytes  4. Monitor CK  5. Keep UOP >75cc/h    GI/NUTRITION:  PROBLEMS:  1. malnutrition  2. Dysphagia  3. Liver laceration  4. Kidney laceration  5. CT A/P--fluid around spleen--likely from liver injury; omental contusion, pancreas appears to be intact  PLAN:  1. NPO for now  2. Monitor H/H  3. Monitor LFTs    ID:  PROBLEMS:  1. Febrile  PLAN:  1. Change out introducer--done 6/12    HEMATOLOGIC:  PROBLEMS:  1. acute blood loss anemia  PLAN:  1. CBC    ENDOCRINE:  PROBLEMS:  1. No active issues   PLAN:  1. No active issues         PROPHYLAXIS:   Stress ulcer: PPI   VTE: SCDs, start lovenox postop      DISPOSITION:   Continue ICU. CC TIME:  I spent 31 min managing this patients critical issues which included rhabdo, multiple injuries, acute respiratory failure excluding time teaching and performing procedures. Cleared for surgery with faye Mejia
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
## 2019-06-15 NOTE — FLOWSHEET NOTE
Attempts to reach for et tube and og tube when released. Unable to verbally redirect. Soft restraints to right and left wrists continued.
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
## 2019-06-15 NOTE — PROGRESS NOTES
Nutrition Assessment    Type and Reason for Visit: Consult(TF reccomendations only )    Nutrition Recommendations:   Immune-enhancing @ 55ml/hr continuous w/ 1 additional protein modular daily to provide patient with 1320 ml TV, 1980 kcals, 124 gm protein, 1001 ml water from formula. With protein modular provides patient with 2080 kcals, 150gm protein. Current propofol rate @ 33.2 ml/hr providing patient with additional 876 kcals from lipids. Please consult if updated recommendations required. Nutrient Needs:  · Estimated Daily Total Kcal: 8463-8540(PS03B: REE= 2831; MV= 8.36, Tmax= 37.8 )  · Estimated Daily Protein (g): 140-160(1.8-2.0)  · Estimated Daily Total Fluid (ml/day): per critical care     See full RD progress note 6/14 for complete assessment. Thank you!      Electronically signed by Francisco Krause RD, LD on 6/15/19 at 1:49 PM    Contact Number:

## 2019-06-15 NOTE — PLAN OF CARE
Problem: Falls - Risk of:  Goal: Will remain free from falls  Description  Will remain free from falls  6/14/2019 2159 by Debbie Humphrey RN  Outcome: Met This Shift  6/14/2019 1614 by Marita So RN  Outcome: Met This Shift  Goal: Absence of physical injury  Description  Absence of physical injury  6/14/2019 2159 by Debbie Humphrey RN  Outcome: Met This Shift  6/14/2019 1614 by Marita So RN  Outcome: Met This Shift     Problem: Risk for Impaired Skin Integrity  Goal: Tissue integrity - skin and mucous membranes  Description  Structural intactness and normal physiological function of skin and  mucous membranes. Outcome: Met This Shift     Problem: Restraint Use - Nonviolent/Non-Self-Destructive Behavior:  Goal: Absence of restraint-related injury  Description  Absence of restraint-related injury   6/14/2019 2158 by Debbie Humphrey RN  Outcome: Met This Shift  6/14/2019 1614 by Marita So RN  Outcome: Met This Shift  6/14/2019 1402 by Reyes Rhymes, RN  Outcome: Met This Shift  6/14/2019 1402 by Reyes Rhymes, RN  Reactivated     Problem: Inadequate energy intake (NI-1.4)  Goal: Food and/or Nutrient Delivery  Description  Individualized approach for food/nutrient provision.   6/14/2019 1239 by Duy Perez MS, RD, LD  Outcome: Met This Shift

## 2019-06-15 NOTE — FLOWSHEET NOTE
Attempts to reach for lines and tubes when not restrained. Poor safety awareness, and very impulsive. Verbal redirection and education with poor affect. Bilateral soft wrist restraints continue for safety.

## 2019-06-15 NOTE — PLAN OF CARE
Problem: Restraint Use - Nonviolent/Non-Self-Destructive Behavior:  Goal: Absence of restraint-related injury  Description  Absence of restraint-related injury   6/14/2019 2158 by Suleman Kurtz RN  Outcome: Met This Shift  6/14/2019 1614 by Rosa Garza RN  Outcome: Met This Shift  6/14/2019 1402 by Shlomo Canales RN  Outcome: Met This Shift  6/14/2019 1402 by Shlomo Canales RN  Reactivated     Problem: Restraint Use - Nonviolent/Non-Self-Destructive Behavior:  Goal: Absence of restraint indications  Description  Absence of restraint indications   6/14/2019 2158 by Suleman Kurtz RN  Outcome: Not Met This Shift  6/14/2019 1614 by Rosa Garza RN  Outcome: Not Met This Shift  6/14/2019 1402 by Shlomo Canales RN  Outcome: Not Met This Shift  6/14/2019 1402 by Shlomo Canales RN  Reactivated

## 2019-06-16 ENCOUNTER — APPOINTMENT (OUTPATIENT)
Dept: CT IMAGING | Age: 24
DRG: 003 | End: 2019-06-16
Payer: OTHER MISCELLANEOUS

## 2019-06-16 ENCOUNTER — APPOINTMENT (OUTPATIENT)
Dept: INTERVENTIONAL RADIOLOGY/VASCULAR | Age: 24
DRG: 003 | End: 2019-06-16
Payer: OTHER MISCELLANEOUS

## 2019-06-16 ENCOUNTER — APPOINTMENT (OUTPATIENT)
Dept: GENERAL RADIOLOGY | Age: 24
DRG: 003 | End: 2019-06-16
Payer: OTHER MISCELLANEOUS

## 2019-06-16 LAB
AADO2: 79.8 MMHG
ALBUMIN SERPL-MCNC: 2.8 G/DL (ref 3.5–5.2)
ALP BLD-CCNC: 127 U/L (ref 40–129)
ALT SERPL-CCNC: 961 U/L (ref 0–40)
ANION GAP SERPL CALCULATED.3IONS-SCNC: 11 MMOL/L (ref 7–16)
ANISOCYTOSIS: ABNORMAL
APTT: 26.6 SEC (ref 24.5–35.1)
AST SERPL-CCNC: 272 U/L (ref 0–39)
B.E.: 3.7 MMOL/L (ref -3–3)
BASOPHILS ABSOLUTE: 0.12 E9/L (ref 0–0.2)
BASOPHILS RELATIVE PERCENT: 0.9 % (ref 0–2)
BILIRUB SERPL-MCNC: 1.2 MG/DL (ref 0–1.2)
BUN BLDV-MCNC: 15 MG/DL (ref 6–20)
CALCIUM IONIZED: 1.11 MMOL/L (ref 1.15–1.33)
CALCIUM SERPL-MCNC: 8 MG/DL (ref 8.6–10.2)
CHLORIDE BLD-SCNC: 103 MMOL/L (ref 98–107)
CO2: 26 MMOL/L (ref 22–29)
COHB: 0.7 % (ref 0–1.5)
CREAT SERPL-MCNC: 0.5 MG/DL (ref 0.7–1.2)
CRITICAL: ABNORMAL
CULTURE, RESPIRATORY: ABNORMAL
CULTURE, RESPIRATORY: ABNORMAL
DATE ANALYZED: ABNORMAL
DATE OF COLLECTION: ABNORMAL
EOSINOPHILS ABSOLUTE: 0.35 E9/L (ref 0.05–0.5)
EOSINOPHILS RELATIVE PERCENT: 2.6 % (ref 0–6)
FIO2: 40 %
GFR AFRICAN AMERICAN: >60
GFR NON-AFRICAN AMERICAN: >60 ML/MIN/1.73
GLUCOSE BLD-MCNC: 139 MG/DL (ref 74–99)
HCO3: 27.1 MMOL/L (ref 22–26)
HCT VFR BLD CALC: 22.8 % (ref 37–54)
HCT VFR BLD CALC: 24.7 % (ref 37–54)
HCT VFR BLD CALC: 26.1 % (ref 37–54)
HCT VFR BLD CALC: 26.3 % (ref 37–54)
HEMOGLOBIN: 7.6 G/DL (ref 12.5–16.5)
HEMOGLOBIN: 8.3 G/DL (ref 12.5–16.5)
HEMOGLOBIN: 8.6 G/DL (ref 12.5–16.5)
HEMOGLOBIN: 8.6 G/DL (ref 12.5–16.5)
HHB: 1.7 % (ref 0–5)
INR BLD: 1.1
LAB: ABNORMAL
LYMPHOCYTES ABSOLUTE: 1.22 E9/L (ref 1.5–4)
LYMPHOCYTES RELATIVE PERCENT: 8.7 % (ref 20–42)
Lab: ABNORMAL
MAGNESIUM: 2.2 MG/DL (ref 1.6–2.6)
MCH RBC QN AUTO: 28.4 PG (ref 26–35)
MCHC RBC AUTO-ENTMCNC: 33.6 % (ref 32–34.5)
MCV RBC AUTO: 84.6 FL (ref 80–99.9)
METAMYELOCYTES RELATIVE PERCENT: 1.7 % (ref 0–1)
METHB: 0.3 % (ref 0–1.5)
MODE: AC
MONOCYTES ABSOLUTE: 0.82 E9/L (ref 0.1–0.95)
MONOCYTES RELATIVE PERCENT: 6.1 % (ref 2–12)
NEUTROPHILS ABSOLUTE: 11.15 E9/L (ref 1.8–7.3)
NEUTROPHILS RELATIVE PERCENT: 80 % (ref 43–80)
NUCLEATED RED BLOOD CELLS: 0.9 /100 WBC
O2 SATURATION: 98.3 % (ref 92–98.5)
O2HB: 97.3 % (ref 94–97)
OPERATOR ID: ABNORMAL
ORGANISM: ABNORMAL
PATIENT TEMP: 37 C
PCO2: 36 MMHG (ref 35–45)
PDW BLD-RTO: 15.6 FL (ref 11.5–15)
PEEP/CPAP: 10 CMH2O
PFO2: 3.85 MMHG/%
PH BLOOD GAS: 7.49 (ref 7.35–7.45)
PHOSPHORUS: 2.4 MG/DL (ref 2.5–4.5)
PLATELET # BLD: 159 E9/L (ref 130–450)
PMV BLD AUTO: 9.7 FL (ref 7–12)
PO2: 154 MMHG (ref 60–100)
POLYCHROMASIA: ABNORMAL
POTASSIUM SERPL-SCNC: 4 MMOL/L (ref 3.5–5)
PROTHROMBIN TIME: 12.8 SEC (ref 9.3–12.4)
RBC # BLD: 2.92 E12/L (ref 3.8–5.8)
RI(T): 0.52
RR MECHANICAL: 18 B/MIN
SMEAR, RESPIRATORY: ABNORMAL
SODIUM BLD-SCNC: 140 MMOL/L (ref 132–146)
SOURCE, BLOOD GAS: ABNORMAL
THB: 9.2 G/DL (ref 11.5–16.5)
TIME ANALYZED: 515
TOTAL CK: 4333 U/L (ref 20–200)
TOTAL PROTEIN: 5.5 G/DL (ref 6.4–8.3)
VT MECHANICAL: 500 ML
WBC # BLD: 13.6 E9/L (ref 4.5–11.5)

## 2019-06-16 PROCEDURE — 99291 CRITICAL CARE FIRST HOUR: CPT | Performed by: SURGERY

## 2019-06-16 PROCEDURE — 6370000000 HC RX 637 (ALT 250 FOR IP): Performed by: STUDENT IN AN ORGANIZED HEALTH CARE EDUCATION/TRAINING PROGRAM

## 2019-06-16 PROCEDURE — 2580000003 HC RX 258: Performed by: STUDENT IN AN ORGANIZED HEALTH CARE EDUCATION/TRAINING PROGRAM

## 2019-06-16 PROCEDURE — 04L33DZ OCCLUSION OF HEPATIC ARTERY WITH INTRALUMINAL DEVICE, PERCUTANEOUS APPROACH: ICD-10-PCS | Performed by: RADIOLOGY

## 2019-06-16 PROCEDURE — 36415 COLL VENOUS BLD VENIPUNCTURE: CPT

## 2019-06-16 PROCEDURE — 80053 COMPREHEN METABOLIC PANEL: CPT

## 2019-06-16 PROCEDURE — 85014 HEMATOCRIT: CPT

## 2019-06-16 PROCEDURE — 82550 ASSAY OF CK (CPK): CPT

## 2019-06-16 PROCEDURE — 6360000002 HC RX W HCPCS: Performed by: STUDENT IN AN ORGANIZED HEALTH CARE EDUCATION/TRAINING PROGRAM

## 2019-06-16 PROCEDURE — 83735 ASSAY OF MAGNESIUM: CPT

## 2019-06-16 PROCEDURE — 94640 AIRWAY INHALATION TREATMENT: CPT

## 2019-06-16 PROCEDURE — 2000000000 HC ICU R&B

## 2019-06-16 PROCEDURE — 94003 VENT MGMT INPAT SUBQ DAY: CPT

## 2019-06-16 PROCEDURE — 82330 ASSAY OF CALCIUM: CPT

## 2019-06-16 PROCEDURE — 2500000003 HC RX 250 WO HCPCS: Performed by: RADIOLOGY

## 2019-06-16 PROCEDURE — C1894 INTRO/SHEATH, NON-LASER: HCPCS

## 2019-06-16 PROCEDURE — 37244 VASC EMBOLIZE/OCCLUDE BLEED: CPT

## 2019-06-16 PROCEDURE — 2500000003 HC RX 250 WO HCPCS: Performed by: STUDENT IN AN ORGANIZED HEALTH CARE EDUCATION/TRAINING PROGRAM

## 2019-06-16 PROCEDURE — 74174 CTA ABD&PLVS W/CONTRAST: CPT

## 2019-06-16 PROCEDURE — 2500000003 HC RX 250 WO HCPCS: Performed by: SURGERY

## 2019-06-16 PROCEDURE — 84100 ASSAY OF PHOSPHORUS: CPT

## 2019-06-16 PROCEDURE — 6360000004 HC RX CONTRAST MEDICATION: Performed by: RADIOLOGY

## 2019-06-16 PROCEDURE — 6360000002 HC RX W HCPCS: Performed by: SURGERY

## 2019-06-16 PROCEDURE — 82805 BLOOD GASES W/O2 SATURATION: CPT

## 2019-06-16 PROCEDURE — B4121ZZ FLUOROSCOPY OF HEPATIC ARTERY USING LOW OSMOLAR CONTRAST: ICD-10-PCS | Performed by: RADIOLOGY

## 2019-06-16 PROCEDURE — 36247 INS CATH ABD/L-EXT ART 3RD: CPT

## 2019-06-16 PROCEDURE — 6360000002 HC RX W HCPCS: Performed by: RADIOLOGY

## 2019-06-16 PROCEDURE — 75774 ARTERY X-RAY EACH VESSEL: CPT

## 2019-06-16 PROCEDURE — 71045 X-RAY EXAM CHEST 1 VIEW: CPT

## 2019-06-16 PROCEDURE — 2580000003 HC RX 258: Performed by: RADIOLOGY

## 2019-06-16 PROCEDURE — 71250 CT THORAX DX C-: CPT

## 2019-06-16 PROCEDURE — 2500000003 HC RX 250 WO HCPCS

## 2019-06-16 PROCEDURE — C9113 INJ PANTOPRAZOLE SODIUM, VIA: HCPCS | Performed by: STUDENT IN AN ORGANIZED HEALTH CARE EDUCATION/TRAINING PROGRAM

## 2019-06-16 PROCEDURE — 85018 HEMOGLOBIN: CPT

## 2019-06-16 PROCEDURE — 6370000000 HC RX 637 (ALT 250 FOR IP): Performed by: SURGERY

## 2019-06-16 PROCEDURE — 75726 ARTERY X-RAYS ABDOMEN: CPT

## 2019-06-16 PROCEDURE — 85610 PROTHROMBIN TIME: CPT

## 2019-06-16 PROCEDURE — 2580000003 HC RX 258: Performed by: SURGERY

## 2019-06-16 PROCEDURE — 85025 COMPLETE CBC W/AUTO DIFF WBC: CPT

## 2019-06-16 PROCEDURE — 85730 THROMBOPLASTIN TIME PARTIAL: CPT

## 2019-06-16 RX ORDER — LIDOCAINE HYDROCHLORIDE 20 MG/ML
5 INJECTION, SOLUTION INFILTRATION; PERINEURAL ONCE
Status: COMPLETED | OUTPATIENT
Start: 2019-06-16 | End: 2019-06-16

## 2019-06-16 RX ORDER — LABETALOL HYDROCHLORIDE 5 MG/ML
INJECTION, SOLUTION INTRAVENOUS
Status: COMPLETED
Start: 2019-06-16 | End: 2019-06-16

## 2019-06-16 RX ORDER — LABETALOL HYDROCHLORIDE 5 MG/ML
10 INJECTION, SOLUTION INTRAVENOUS EVERY 10 MIN PRN
Status: DISCONTINUED | OUTPATIENT
Start: 2019-06-16 | End: 2019-06-28 | Stop reason: HOSPADM

## 2019-06-16 RX ORDER — HEPARIN SODIUM 10000 [USP'U]/ML
5000 INJECTION, SOLUTION INTRAVENOUS; SUBCUTANEOUS
Status: COMPLETED | OUTPATIENT
Start: 2019-06-16 | End: 2019-06-16

## 2019-06-16 RX ORDER — LACTULOSE 10 G/15ML
20 SOLUTION ORAL 2 TIMES DAILY
Status: DISCONTINUED | OUTPATIENT
Start: 2019-06-16 | End: 2019-06-20

## 2019-06-16 RX ORDER — HYDRALAZINE HYDROCHLORIDE 20 MG/ML
10 INJECTION INTRAMUSCULAR; INTRAVENOUS EVERY 10 MIN PRN
Status: DISCONTINUED | OUTPATIENT
Start: 2019-06-16 | End: 2019-06-28 | Stop reason: HOSPADM

## 2019-06-16 RX ORDER — LIDOCAINE HYDROCHLORIDE AND EPINEPHRINE 10; 10 MG/ML; UG/ML
40 INJECTION, SOLUTION INFILTRATION; PERINEURAL ONCE
Status: DISCONTINUED | OUTPATIENT
Start: 2019-06-16 | End: 2019-06-17

## 2019-06-16 RX ORDER — NALOXONE HYDROCHLORIDE 0.4 MG/ML
0.4 INJECTION, SOLUTION INTRAMUSCULAR; INTRAVENOUS; SUBCUTANEOUS PRN
Status: DISCONTINUED | OUTPATIENT
Start: 2019-06-16 | End: 2019-06-24

## 2019-06-16 RX ORDER — LABETALOL HYDROCHLORIDE 5 MG/ML
10 INJECTION, SOLUTION INTRAVENOUS ONCE
Status: COMPLETED | OUTPATIENT
Start: 2019-06-16 | End: 2019-06-16

## 2019-06-16 RX ORDER — FUROSEMIDE 10 MG/ML
20 INJECTION INTRAMUSCULAR; INTRAVENOUS ONCE
Status: COMPLETED | OUTPATIENT
Start: 2019-06-16 | End: 2019-06-16

## 2019-06-16 RX ORDER — SODIUM CHLORIDE 0.9 % (FLUSH) 0.9 %
10 SYRINGE (ML) INJECTION ONCE
Status: COMPLETED | OUTPATIENT
Start: 2019-06-16 | End: 2019-06-16

## 2019-06-16 RX ADMIN — BACITRACIN ZINC: 500 OINTMENT TOPICAL at 09:03

## 2019-06-16 RX ADMIN — SODIUM CHLORIDE 0.6 MCG/KG/HR: 9 INJECTION, SOLUTION INTRAVENOUS at 09:26

## 2019-06-16 RX ADMIN — Medication: at 08:51

## 2019-06-16 RX ADMIN — Medication 5000 UNITS: at 21:30

## 2019-06-16 RX ADMIN — BACITRACIN ZINC: 500 OINTMENT TOPICAL at 13:30

## 2019-06-16 RX ADMIN — DOCUSATE SODIUM 100 MG: 50 LIQUID ORAL at 22:50

## 2019-06-16 RX ADMIN — Medication 5000 UNITS: at 21:35

## 2019-06-16 RX ADMIN — SODIUM CHLORIDE, POTASSIUM CHLORIDE, SODIUM LACTATE AND CALCIUM CHLORIDE: 600; 310; 30; 20 INJECTION, SOLUTION INTRAVENOUS at 14:57

## 2019-06-16 RX ADMIN — ACETAMINOPHEN ORAL SOLUTION 650 MG: 650 SOLUTION ORAL at 22:49

## 2019-06-16 RX ADMIN — SODIUM CHLORIDE 0.6 MCG/KG/HR: 9 INJECTION, SOLUTION INTRAVENOUS at 01:59

## 2019-06-16 RX ADMIN — LACTULOSE 20 G: 20 SOLUTION ORAL at 11:58

## 2019-06-16 RX ADMIN — Medication 10 ML: at 22:50

## 2019-06-16 RX ADMIN — Medication 10 ML: at 14:14

## 2019-06-16 RX ADMIN — ACETAMINOPHEN ORAL SOLUTION 650 MG: 650 SOLUTION ORAL at 15:01

## 2019-06-16 RX ADMIN — ENOXAPARIN SODIUM 40 MG: 40 INJECTION, SOLUTION INTRAVENOUS; SUBCUTANEOUS at 22:51

## 2019-06-16 RX ADMIN — LIDOCAINE HYDROCHLORIDE 5 ML: 20 INJECTION, SOLUTION INFILTRATION; PERINEURAL at 21:30

## 2019-06-16 RX ADMIN — CEFAZOLIN SODIUM 2 G: 10 INJECTION, POWDER, FOR SOLUTION INTRAVENOUS at 11:48

## 2019-06-16 RX ADMIN — IPRATROPIUM BROMIDE AND ALBUTEROL SULFATE 1 AMPULE: .5; 3 SOLUTION RESPIRATORY (INHALATION) at 20:21

## 2019-06-16 RX ADMIN — CEFAZOLIN SODIUM 2 G: 10 INJECTION, POWDER, FOR SOLUTION INTRAVENOUS at 20:00

## 2019-06-16 RX ADMIN — SODIUM CHLORIDE 0.6 MCG/KG/HR: 9 INJECTION, SOLUTION INTRAVENOUS at 17:59

## 2019-06-16 RX ADMIN — Medication 5000 UNITS: at 21:40

## 2019-06-16 RX ADMIN — LABETALOL HYDROCHLORIDE 10 MG: 5 INJECTION INTRAVENOUS at 09:39

## 2019-06-16 RX ADMIN — IPRATROPIUM BROMIDE AND ALBUTEROL SULFATE 1 AMPULE: .5; 3 SOLUTION RESPIRATORY (INHALATION) at 12:08

## 2019-06-16 RX ADMIN — CHLORHEXIDINE GLUCONATE 0.12% ORAL RINSE 15 ML: 1.2 LIQUID ORAL at 09:03

## 2019-06-16 RX ADMIN — METHOCARBAMOL 1000 MG: 100 INJECTION, SOLUTION INTRAMUSCULAR; INTRAVENOUS at 02:24

## 2019-06-16 RX ADMIN — SODIUM PHOSPHATE, MONOBASIC, MONOHYDRATE 30 MMOL: 276; 142 INJECTION, SOLUTION INTRAVENOUS at 00:21

## 2019-06-16 RX ADMIN — SODIUM CHLORIDE 0.6 MCG/KG/HR: 9 INJECTION, SOLUTION INTRAVENOUS at 05:59

## 2019-06-16 RX ADMIN — IPRATROPIUM BROMIDE AND ALBUTEROL SULFATE 1 AMPULE: .5; 3 SOLUTION RESPIRATORY (INHALATION) at 16:26

## 2019-06-16 RX ADMIN — LABETALOL HYDROCHLORIDE 10 MG: 5 INJECTION INTRAVENOUS at 09:28

## 2019-06-16 RX ADMIN — Medication 10 ML: at 12:25

## 2019-06-16 RX ADMIN — SODIUM CHLORIDE 0.6 MCG/KG/HR: 9 INJECTION, SOLUTION INTRAVENOUS at 13:22

## 2019-06-16 RX ADMIN — SODIUM PHOSPHATE, MONOBASIC, MONOHYDRATE 30 MMOL: 276; 142 INJECTION, SOLUTION INTRAVENOUS at 09:21

## 2019-06-16 RX ADMIN — Medication 5000 UNITS: at 21:45

## 2019-06-16 RX ADMIN — SODIUM CHLORIDE 1.4 MCG/KG/HR: 9 INJECTION, SOLUTION INTRAVENOUS at 22:38

## 2019-06-16 RX ADMIN — Medication 10 ML: at 11:16

## 2019-06-16 RX ADMIN — METHOCARBAMOL 1000 MG: 100 INJECTION, SOLUTION INTRAMUSCULAR; INTRAVENOUS at 10:37

## 2019-06-16 RX ADMIN — CHLORHEXIDINE GLUCONATE 0.12% ORAL RINSE 15 ML: 1.2 LIQUID ORAL at 22:50

## 2019-06-16 RX ADMIN — OXYCODONE HYDROCHLORIDE 10 MG: 5 SOLUTION ORAL at 22:50

## 2019-06-16 RX ADMIN — LABETALOL HYDROCHLORIDE 10 MG: 5 INJECTION INTRAVENOUS at 09:53

## 2019-06-16 RX ADMIN — IOPAMIDOL 110 ML: 755 INJECTION, SOLUTION INTRAVENOUS at 11:16

## 2019-06-16 RX ADMIN — FUROSEMIDE 20 MG: 10 INJECTION, SOLUTION INTRAMUSCULAR; INTRAVENOUS at 08:28

## 2019-06-16 RX ADMIN — ENOXAPARIN SODIUM 40 MG: 40 INJECTION, SOLUTION INTRAVENOUS; SUBCUTANEOUS at 09:04

## 2019-06-16 RX ADMIN — Medication 10 ML: at 09:43

## 2019-06-16 RX ADMIN — LABETALOL HYDROCHLORIDE 10 MG: 5 INJECTION, SOLUTION INTRAVENOUS at 03:45

## 2019-06-16 RX ADMIN — BACITRACIN ZINC: 500 OINTMENT TOPICAL at 20:59

## 2019-06-16 RX ADMIN — LACTULOSE 20 G: 20 SOLUTION ORAL at 22:50

## 2019-06-16 RX ADMIN — METHOCARBAMOL 1000 MG: 100 INJECTION, SOLUTION INTRAMUSCULAR; INTRAVENOUS at 19:17

## 2019-06-16 RX ADMIN — DOCUSATE SODIUM 100 MG: 50 LIQUID ORAL at 11:57

## 2019-06-16 RX ADMIN — IPRATROPIUM BROMIDE AND ALBUTEROL SULFATE 1 AMPULE: .5; 3 SOLUTION RESPIRATORY (INHALATION) at 06:46

## 2019-06-16 RX ADMIN — LABETALOL HYDROCHLORIDE 10 MG: 5 INJECTION INTRAVENOUS at 03:45

## 2019-06-16 RX ADMIN — AMPICILLIN SODIUM AND SULBACTAM SODIUM 3 G: 2; 1 INJECTION, POWDER, FOR SOLUTION INTRAMUSCULAR; INTRAVENOUS at 05:59

## 2019-06-16 RX ADMIN — LABETALOL HYDROCHLORIDE 10 MG: 5 INJECTION INTRAVENOUS at 09:19

## 2019-06-16 RX ADMIN — OXYCODONE HYDROCHLORIDE 10 MG: 5 SOLUTION ORAL at 11:56

## 2019-06-16 RX ADMIN — Medication: at 13:52

## 2019-06-16 RX ADMIN — LABETALOL HYDROCHLORIDE 10 MG: 5 INJECTION INTRAVENOUS at 11:53

## 2019-06-16 RX ADMIN — OXYCODONE HYDROCHLORIDE 10 MG: 5 SOLUTION ORAL at 03:30

## 2019-06-16 RX ADMIN — Medication 10 ML: at 09:03

## 2019-06-16 RX ADMIN — OXYCODONE HYDROCHLORIDE 10 MG: 5 SOLUTION ORAL at 15:01

## 2019-06-16 RX ADMIN — FENTANYL CITRATE 50 MCG: 50 INJECTION, SOLUTION INTRAMUSCULAR; INTRAVENOUS at 01:40

## 2019-06-16 RX ADMIN — PANTOPRAZOLE SODIUM 40 MG: 40 INJECTION, POWDER, FOR SOLUTION INTRAVENOUS at 09:03

## 2019-06-16 RX ADMIN — CALCIUM GLUCONATE 4 G: 98 INJECTION, SOLUTION INTRAVENOUS at 08:21

## 2019-06-16 ASSESSMENT — PULMONARY FUNCTION TESTS
PIF_VALUE: 24
PIF_VALUE: 29
PIF_VALUE: 23
PIF_VALUE: 24
PIF_VALUE: 27
PIF_VALUE: 25
PIF_VALUE: 24
PIF_VALUE: 26
PIF_VALUE: 30
PIF_VALUE: 28
PIF_VALUE: 25
PIF_VALUE: 25
PIF_VALUE: 28
PIF_VALUE: 25
PIF_VALUE: 23
PIF_VALUE: 25
PIF_VALUE: 23
PIF_VALUE: 26
PIF_VALUE: 25
PIF_VALUE: 28
PIF_VALUE: 21
PIF_VALUE: 25
PIF_VALUE: 24
PIF_VALUE: 28
PIF_VALUE: 37
PIF_VALUE: 25
PIF_VALUE: 33
PIF_VALUE: 33
PIF_VALUE: 28
PIF_VALUE: 33
PIF_VALUE: 26
PIF_VALUE: 22
PIF_VALUE: 28

## 2019-06-16 ASSESSMENT — PAIN SCALES - GENERAL
PAINLEVEL_OUTOF10: 0
PAINLEVEL_OUTOF10: 7
PAINLEVEL_OUTOF10: 0
PAINLEVEL_OUTOF10: 0
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
## 2019-06-16 NOTE — PROGRESS NOTES
Department of Orthopedic Surgery  Resident Progress Note    Patient is intubated, responds to questions. Writes on pad. Patient seen and examined. Pain controlled. No new complaints. Denies numbness, tingling to the LLE. VITALS:  BP (!) 150/86   Pulse 90   Temp 100.5 °F (38.1 °C) (Bladder)   Resp 27   Ht 6' (1.829 m)   Wt (!) 389 lb (176.4 kg)   SpO2 93%   BMI 52.76 kg/m²     GENERAL: alert  MUSCULOSKELETAL:   left lower extremity:  · Dressing C/D/I  · Compartments soft and compressible, calf non-tender  · Palpable dorsalis pedis and posterior tibialis pulse, brisk cap refill to toes, foot warm and perfused  · Sensation intact to light touch in sural/deep peroneal/superficial peroneal/saphenous/posterior tibial nerve distributions to foot/ankle.   · Patient reports decreased sensation to foot compared to contralateral side  · Demonstrates active ankle plantar/dorsiflexion/great toe extension    CBC:   Lab Results   Component Value Date    WBC 13.6 06/16/2019    HGB 8.3 06/16/2019    HCT 24.7 06/16/2019     06/16/2019       ASSESSMENT  · L femur IMN    PLAN    NWB LLE  Pain control per trauma  DVT prophylaxis per trauma  Monitor Labs  PT/OT - when able  D/C planning, SW/PT recs  Discuss with attending

## 2019-06-16 NOTE — FLOWSHEET NOTE
Reaching for et tube and og tube when released for care. Unable to verbally redirect. Soft restraints to right and left wrists continued.

## 2019-06-16 NOTE — FLOWSHEET NOTE
Patient continues to reach for lines and tubes while performing patient care unable to redirect at this time continue to monitor

## 2019-06-17 ENCOUNTER — APPOINTMENT (OUTPATIENT)
Dept: GENERAL RADIOLOGY | Age: 24
DRG: 003 | End: 2019-06-17
Payer: OTHER MISCELLANEOUS

## 2019-06-17 PROBLEM — E87.20 LACTIC ACIDOSIS: Status: RESOLVED | Noted: 2019-06-12 | Resolved: 2019-06-17

## 2019-06-17 PROBLEM — E83.51 HYPOCALCEMIA: Status: RESOLVED | Noted: 2019-06-12 | Resolved: 2019-06-17

## 2019-06-17 LAB
7-AMINOCLONAZEPAM, URINE: <5 NG/ML
AADO2: 143.9 MMHG
ALBUMIN SERPL-MCNC: 2.6 G/DL (ref 3.5–5.2)
ALBUMIN SERPL-MCNC: 2.8 G/DL (ref 3.5–5.2)
ALP BLD-CCNC: 139 U/L (ref 40–129)
ALP BLD-CCNC: 171 U/L (ref 40–129)
ALPHA-HYDROXYALPRAZOLAM, URINE: <5 NG/ML
ALPHA-HYDROXYMIDAZOLAM, URINE: 607 NG/ML
ALPRAZOLAM, URINE: <5 NG/ML
ALT SERPL-CCNC: 499 U/L (ref 0–40)
ALT SERPL-CCNC: 598 U/L (ref 0–40)
ANION GAP SERPL CALCULATED.3IONS-SCNC: 11 MMOL/L (ref 7–16)
ANION GAP SERPL CALCULATED.3IONS-SCNC: 7 MMOL/L (ref 7–16)
ANISOCYTOSIS: ABNORMAL
AST SERPL-CCNC: 164 U/L (ref 0–39)
AST SERPL-CCNC: 226 U/L (ref 0–39)
B.E.: 1.6 MMOL/L (ref -3–3)
BACTERIA: ABNORMAL /HPF
BASOPHILS ABSOLUTE: 0 E9/L (ref 0–0.2)
BASOPHILS RELATIVE PERCENT: 0.5 % (ref 0–2)
BILIRUB SERPL-MCNC: 3 MG/DL (ref 0–1.2)
BILIRUB SERPL-MCNC: 4 MG/DL (ref 0–1.2)
BILIRUBIN DIRECT: 3.1 MG/DL (ref 0–0.3)
BILIRUBIN URINE: ABNORMAL
BILIRUBIN, INDIRECT: 0.9 MG/DL (ref 0–1)
BLOOD, URINE: ABNORMAL
BUN BLDV-MCNC: 12 MG/DL (ref 6–20)
BUN BLDV-MCNC: 15 MG/DL (ref 6–20)
CALCIUM IONIZED: 1.17 MMOL/L (ref 1.15–1.33)
CALCIUM SERPL-MCNC: 8.1 MG/DL (ref 8.6–10.2)
CALCIUM SERPL-MCNC: 8.4 MG/DL (ref 8.6–10.2)
CHLORDIAZEPOXIDE, URINE: <20 NG/ML
CHLORIDE BLD-SCNC: 102 MMOL/L (ref 98–107)
CHLORIDE BLD-SCNC: 99 MMOL/L (ref 98–107)
CLARITY: CLEAR
CLONAZEPAM, URINE: <5 NG/ML
CO2: 27 MMOL/L (ref 22–29)
CO2: 29 MMOL/L (ref 22–29)
COHB: 1.5 % (ref 0–1.5)
COLOR: ABNORMAL
CREAT SERPL-MCNC: 0.4 MG/DL (ref 0.7–1.2)
CREAT SERPL-MCNC: 0.5 MG/DL (ref 0.7–1.2)
CRITICAL: ABNORMAL
DATE ANALYZED: ABNORMAL
DATE OF COLLECTION: ABNORMAL
DIAZEPAM, URINE: <20 NG/ML
EOSINOPHILS ABSOLUTE: 0 E9/L (ref 0.05–0.5)
EOSINOPHILS RELATIVE PERCENT: 1.9 % (ref 0–6)
FIO2: 40 %
GFR AFRICAN AMERICAN: >60
GFR AFRICAN AMERICAN: >60
GFR NON-AFRICAN AMERICAN: >60 ML/MIN/1.73
GFR NON-AFRICAN AMERICAN: >60 ML/MIN/1.73
GLUCOSE BLD-MCNC: 129 MG/DL (ref 74–99)
GLUCOSE BLD-MCNC: 141 MG/DL (ref 74–99)
GLUCOSE URINE: 100 MG/DL
HCO3: 25.6 MMOL/L (ref 22–26)
HCT VFR BLD CALC: 26.9 % (ref 37–54)
HCT VFR BLD CALC: 27.4 % (ref 37–54)
HEMOGLOBIN: 8.7 G/DL (ref 12.5–16.5)
HEMOGLOBIN: 9 G/DL (ref 12.5–16.5)
HHB: 4 % (ref 0–5)
KETONES, URINE: ABNORMAL MG/DL
LAB: ABNORMAL
LACTIC ACID, SEPSIS: 1.3 MMOL/L (ref 0.5–1.9)
LEUKOCYTE ESTERASE, URINE: ABNORMAL
LORAZEPAM, URINE: <20 NG/ML
LYMPHOCYTES ABSOLUTE: 1.27 E9/L (ref 1.5–4)
LYMPHOCYTES RELATIVE PERCENT: 7 % (ref 20–42)
Lab: ABNORMAL
MAGNESIUM: 2 MG/DL (ref 1.6–2.6)
MCH RBC QN AUTO: 27.9 PG (ref 26–35)
MCH RBC QN AUTO: 28.2 PG (ref 26–35)
MCHC RBC AUTO-ENTMCNC: 32.3 % (ref 32–34.5)
MCHC RBC AUTO-ENTMCNC: 32.8 % (ref 32–34.5)
MCV RBC AUTO: 85.9 FL (ref 80–99.9)
MCV RBC AUTO: 86.2 FL (ref 80–99.9)
METAMYELOCYTES RELATIVE PERCENT: 1.7 % (ref 0–1)
METHB: 0.3 % (ref 0–1.5)
MIDAZOLAM, URINE: <20 NG/ML
MODE: AC
MONOCYTES ABSOLUTE: 1.45 E9/L (ref 0.1–0.95)
MONOCYTES RELATIVE PERCENT: 7.8 % (ref 2–12)
MUCUS: PRESENT
MYELOCYTE PERCENT: 0.9 % (ref 0–0)
NEUTROPHILS ABSOLUTE: 15.2 E9/L (ref 1.8–7.3)
NEUTROPHILS RELATIVE PERCENT: 80.9 % (ref 43–80)
NITRITE, URINE: POSITIVE
NORDIAZEPAM, URINE: <20 NG/ML
O2 SATURATION: 95.9 % (ref 92–98.5)
O2HB: 94.2 % (ref 94–97)
OPERATOR ID: ABNORMAL
OXAZEPAM, URINE: <20 NG/ML
PATIENT TEMP: 37 C
PCO2: 37.7 MMHG (ref 35–45)
PDW BLD-RTO: 15.4 FL (ref 11.5–15)
PDW BLD-RTO: 15.5 FL (ref 11.5–15)
PEEP/CPAP: 8 CMH2O
PFO2: 2.2 MMHG/%
PH BLOOD GAS: 7.45 (ref 7.35–7.45)
PH UA: 6.5 (ref 5–9)
PHOSPHORUS: 3.2 MG/DL (ref 2.5–4.5)
PLATELET # BLD: 179 E9/L (ref 130–450)
PLATELET # BLD: 203 E9/L (ref 130–450)
PMV BLD AUTO: 9.1 FL (ref 7–12)
PMV BLD AUTO: 9.5 FL (ref 7–12)
PO2: 87.9 MMHG (ref 60–100)
POLYCHROMASIA: ABNORMAL
POTASSIUM SERPL-SCNC: 3.9 MMOL/L (ref 3.5–5)
POTASSIUM SERPL-SCNC: 4.2 MMOL/L (ref 3.5–5)
PROMYELOCYTES PERCENT: 1.7 % (ref 0–0)
PROTEIN UA: 100 MG/DL
RBC # BLD: 3.12 E12/L (ref 3.8–5.8)
RBC # BLD: 3.19 E12/L (ref 3.8–5.8)
RBC UA: ABNORMAL /HPF (ref 0–2)
RI(T): 1.64
RR MECHANICAL: 16 B/MIN
SODIUM BLD-SCNC: 137 MMOL/L (ref 132–146)
SODIUM BLD-SCNC: 138 MMOL/L (ref 132–146)
SOURCE, BLOOD GAS: ABNORMAL
SPECIFIC GRAVITY UA: 1.02 (ref 1–1.03)
TEMAZEPAM, URINE: <20 NG/ML
THB: 9.7 G/DL (ref 11.5–16.5)
TIME ANALYZED: 535
TOTAL CK: 3536 U/L (ref 20–200)
TOTAL PROTEIN: 5.6 G/DL (ref 6.4–8.3)
TOTAL PROTEIN: 5.8 G/DL (ref 6.4–8.3)
UROBILINOGEN, URINE: 4 E.U./DL
VT MECHANICAL: 500 ML
WBC # BLD: 18.1 E9/L (ref 4.5–11.5)
WBC # BLD: 18.8 E9/L (ref 4.5–11.5)
WBC UA: ABNORMAL /HPF (ref 0–5)

## 2019-06-17 PROCEDURE — 2500000003 HC RX 250 WO HCPCS: Performed by: STUDENT IN AN ORGANIZED HEALTH CARE EDUCATION/TRAINING PROGRAM

## 2019-06-17 PROCEDURE — 6360000002 HC RX W HCPCS: Performed by: STUDENT IN AN ORGANIZED HEALTH CARE EDUCATION/TRAINING PROGRAM

## 2019-06-17 PROCEDURE — 36415 COLL VENOUS BLD VENIPUNCTURE: CPT

## 2019-06-17 PROCEDURE — 6370000000 HC RX 637 (ALT 250 FOR IP): Performed by: STUDENT IN AN ORGANIZED HEALTH CARE EDUCATION/TRAINING PROGRAM

## 2019-06-17 PROCEDURE — 97530 THERAPEUTIC ACTIVITIES: CPT

## 2019-06-17 PROCEDURE — 94003 VENT MGMT INPAT SUBQ DAY: CPT

## 2019-06-17 PROCEDURE — 87040 BLOOD CULTURE FOR BACTERIA: CPT

## 2019-06-17 PROCEDURE — 82550 ASSAY OF CK (CPK): CPT

## 2019-06-17 PROCEDURE — 82330 ASSAY OF CALCIUM: CPT

## 2019-06-17 PROCEDURE — 2000000000 HC ICU R&B

## 2019-06-17 PROCEDURE — 37799 UNLISTED PX VASCULAR SURGERY: CPT

## 2019-06-17 PROCEDURE — 97110 THERAPEUTIC EXERCISES: CPT

## 2019-06-17 PROCEDURE — 80048 BASIC METABOLIC PNL TOTAL CA: CPT

## 2019-06-17 PROCEDURE — 2580000003 HC RX 258: Performed by: STUDENT IN AN ORGANIZED HEALTH CARE EDUCATION/TRAINING PROGRAM

## 2019-06-17 PROCEDURE — 83735 ASSAY OF MAGNESIUM: CPT

## 2019-06-17 PROCEDURE — 87088 URINE BACTERIA CULTURE: CPT

## 2019-06-17 PROCEDURE — 84100 ASSAY OF PHOSPHORUS: CPT

## 2019-06-17 PROCEDURE — 85025 COMPLETE CBC W/AUTO DIFF WBC: CPT

## 2019-06-17 PROCEDURE — 97162 PT EVAL MOD COMPLEX 30 MIN: CPT

## 2019-06-17 PROCEDURE — 83605 ASSAY OF LACTIC ACID: CPT

## 2019-06-17 PROCEDURE — 81001 URINALYSIS AUTO W/SCOPE: CPT

## 2019-06-17 PROCEDURE — 71045 X-RAY EXAM CHEST 1 VIEW: CPT

## 2019-06-17 PROCEDURE — 2500000003 HC RX 250 WO HCPCS: Performed by: SURGERY

## 2019-06-17 PROCEDURE — 99291 CRITICAL CARE FIRST HOUR: CPT | Performed by: SURGERY

## 2019-06-17 PROCEDURE — 2580000003 HC RX 258: Performed by: SURGERY

## 2019-06-17 PROCEDURE — 97166 OT EVAL MOD COMPLEX 45 MIN: CPT

## 2019-06-17 PROCEDURE — C9113 INJ PANTOPRAZOLE SODIUM, VIA: HCPCS | Performed by: STUDENT IN AN ORGANIZED HEALTH CARE EDUCATION/TRAINING PROGRAM

## 2019-06-17 PROCEDURE — 85027 COMPLETE CBC AUTOMATED: CPT

## 2019-06-17 PROCEDURE — 97535 SELF CARE MNGMENT TRAINING: CPT

## 2019-06-17 PROCEDURE — 6370000000 HC RX 637 (ALT 250 FOR IP): Performed by: SURGERY

## 2019-06-17 PROCEDURE — 80053 COMPREHEN METABOLIC PANEL: CPT

## 2019-06-17 PROCEDURE — 80076 HEPATIC FUNCTION PANEL: CPT

## 2019-06-17 PROCEDURE — 94640 AIRWAY INHALATION TREATMENT: CPT

## 2019-06-17 PROCEDURE — 82805 BLOOD GASES W/O2 SATURATION: CPT

## 2019-06-17 RX ORDER — METHOCARBAMOL 500 MG/1
1000 TABLET, FILM COATED ORAL EVERY 6 HOURS
Status: DISCONTINUED | OUTPATIENT
Start: 2019-06-17 | End: 2019-06-28 | Stop reason: HOSPADM

## 2019-06-17 RX ORDER — PANTOPRAZOLE SODIUM 40 MG/1
40 GRANULE, DELAYED RELEASE ORAL
Status: DISCONTINUED | OUTPATIENT
Start: 2019-06-18 | End: 2019-06-28 | Stop reason: HOSPADM

## 2019-06-17 RX ORDER — SODIUM CHLORIDE, SODIUM LACTATE, POTASSIUM CHLORIDE, AND CALCIUM CHLORIDE .6; .31; .03; .02 G/100ML; G/100ML; G/100ML; G/100ML
1000 INJECTION, SOLUTION INTRAVENOUS ONCE
Status: COMPLETED | OUTPATIENT
Start: 2019-06-17 | End: 2019-06-17

## 2019-06-17 RX ADMIN — SODIUM CHLORIDE, POTASSIUM CHLORIDE, SODIUM LACTATE AND CALCIUM CHLORIDE 1000 ML: 600; 310; 30; 20 INJECTION, SOLUTION INTRAVENOUS at 17:11

## 2019-06-17 RX ADMIN — SODIUM CHLORIDE 1 MCG/KG/HR: 9 INJECTION, SOLUTION INTRAVENOUS at 16:05

## 2019-06-17 RX ADMIN — METHOCARBAMOL 1000 MG: 100 INJECTION, SOLUTION INTRAMUSCULAR; INTRAVENOUS at 03:03

## 2019-06-17 RX ADMIN — SODIUM CHLORIDE 1.2 MCG/KG/HR: 9 INJECTION, SOLUTION INTRAVENOUS at 10:05

## 2019-06-17 RX ADMIN — VANCOMYCIN HYDROCHLORIDE 2500 MG: 1 INJECTION, POWDER, LYOPHILIZED, FOR SOLUTION INTRAVENOUS at 18:22

## 2019-06-17 RX ADMIN — DOCUSATE SODIUM 100 MG: 50 LIQUID ORAL at 21:13

## 2019-06-17 RX ADMIN — IPRATROPIUM BROMIDE AND ALBUTEROL SULFATE 1 AMPULE: .5; 3 SOLUTION RESPIRATORY (INHALATION) at 07:43

## 2019-06-17 RX ADMIN — Medication 10 ML: at 21:20

## 2019-06-17 RX ADMIN — BACITRACIN ZINC: 500 OINTMENT TOPICAL at 08:49

## 2019-06-17 RX ADMIN — SODIUM CHLORIDE 1.1 MCG/KG/HR: 9 INJECTION, SOLUTION INTRAVENOUS at 07:48

## 2019-06-17 RX ADMIN — Medication: at 08:09

## 2019-06-17 RX ADMIN — PANTOPRAZOLE SODIUM 40 MG: 40 INJECTION, POWDER, FOR SOLUTION INTRAVENOUS at 08:49

## 2019-06-17 RX ADMIN — METHOCARBAMOL TABLETS 1000 MG: 500 TABLET, COATED ORAL at 15:37

## 2019-06-17 RX ADMIN — OXYCODONE HYDROCHLORIDE 10 MG: 5 SOLUTION ORAL at 15:36

## 2019-06-17 RX ADMIN — IPRATROPIUM BROMIDE AND ALBUTEROL SULFATE 1 AMPULE: .5; 3 SOLUTION RESPIRATORY (INHALATION) at 19:52

## 2019-06-17 RX ADMIN — IPRATROPIUM BROMIDE AND ALBUTEROL SULFATE 1 AMPULE: .5; 3 SOLUTION RESPIRATORY (INHALATION) at 11:56

## 2019-06-17 RX ADMIN — SODIUM CHLORIDE 1 MCG/KG/HR: 9 INJECTION, SOLUTION INTRAVENOUS at 23:28

## 2019-06-17 RX ADMIN — CHLORHEXIDINE GLUCONATE 0.12% ORAL RINSE 15 ML: 1.2 LIQUID ORAL at 21:13

## 2019-06-17 RX ADMIN — Medication 6 MG: at 20:53

## 2019-06-17 RX ADMIN — LABETALOL HYDROCHLORIDE 10 MG: 5 INJECTION INTRAVENOUS at 01:31

## 2019-06-17 RX ADMIN — ENOXAPARIN SODIUM 40 MG: 40 INJECTION, SOLUTION INTRAVENOUS; SUBCUTANEOUS at 21:14

## 2019-06-17 RX ADMIN — CHLORHEXIDINE GLUCONATE 0.12% ORAL RINSE 15 ML: 1.2 LIQUID ORAL at 08:49

## 2019-06-17 RX ADMIN — METHOCARBAMOL TABLETS 1000 MG: 500 TABLET, COATED ORAL at 21:17

## 2019-06-17 RX ADMIN — SODIUM CHLORIDE, POTASSIUM CHLORIDE, SODIUM LACTATE AND CALCIUM CHLORIDE: 600; 310; 30; 20 INJECTION, SOLUTION INTRAVENOUS at 18:30

## 2019-06-17 RX ADMIN — ACETAMINOPHEN ORAL SOLUTION 650 MG: 650 SOLUTION ORAL at 22:06

## 2019-06-17 RX ADMIN — CEFAZOLIN SODIUM 2 G: 10 INJECTION, POWDER, FOR SOLUTION INTRAVENOUS at 11:40

## 2019-06-17 RX ADMIN — SODIUM CHLORIDE, POTASSIUM CHLORIDE, SODIUM LACTATE AND CALCIUM CHLORIDE: 600; 310; 30; 20 INJECTION, SOLUTION INTRAVENOUS at 03:03

## 2019-06-17 RX ADMIN — IPRATROPIUM BROMIDE AND ALBUTEROL SULFATE 1 AMPULE: .5; 3 SOLUTION RESPIRATORY (INHALATION) at 16:13

## 2019-06-17 RX ADMIN — SODIUM CHLORIDE 1 MCG/KG/HR: 9 INJECTION, SOLUTION INTRAVENOUS at 18:22

## 2019-06-17 RX ADMIN — METHOCARBAMOL TABLETS 1000 MG: 500 TABLET, COATED ORAL at 10:06

## 2019-06-17 RX ADMIN — SODIUM CHLORIDE 1.1 MCG/KG/HR: 9 INJECTION, SOLUTION INTRAVENOUS at 05:35

## 2019-06-17 RX ADMIN — BACITRACIN ZINC: 500 OINTMENT TOPICAL at 14:02

## 2019-06-17 RX ADMIN — OXYCODONE HYDROCHLORIDE 10 MG: 5 SOLUTION ORAL at 04:01

## 2019-06-17 RX ADMIN — CEFAZOLIN SODIUM 2 G: 10 INJECTION, POWDER, FOR SOLUTION INTRAVENOUS at 04:04

## 2019-06-17 RX ADMIN — SODIUM CHLORIDE 1.4 MCG/KG/HR: 9 INJECTION, SOLUTION INTRAVENOUS at 01:14

## 2019-06-17 RX ADMIN — LABETALOL HYDROCHLORIDE 10 MG: 5 INJECTION INTRAVENOUS at 01:55

## 2019-06-17 RX ADMIN — SODIUM CHLORIDE 1 MCG/KG/HR: 9 INJECTION, SOLUTION INTRAVENOUS at 20:57

## 2019-06-17 RX ADMIN — LACTULOSE 20 G: 20 SOLUTION ORAL at 21:13

## 2019-06-17 RX ADMIN — OXYCODONE HYDROCHLORIDE 10 MG: 5 SOLUTION ORAL at 08:49

## 2019-06-17 RX ADMIN — CEFEPIME HYDROCHLORIDE 2 G: 2 INJECTION, POWDER, FOR SOLUTION INTRAVENOUS at 18:22

## 2019-06-17 RX ADMIN — BACITRACIN ZINC: 500 OINTMENT TOPICAL at 21:13

## 2019-06-17 RX ADMIN — SODIUM CHLORIDE 1.1 MCG/KG/HR: 9 INJECTION, SOLUTION INTRAVENOUS at 04:01

## 2019-06-17 RX ADMIN — ENOXAPARIN SODIUM 40 MG: 40 INJECTION, SOLUTION INTRAVENOUS; SUBCUTANEOUS at 08:49

## 2019-06-17 RX ADMIN — SODIUM CHLORIDE 1.3 MCG/KG/HR: 9 INJECTION, SOLUTION INTRAVENOUS at 14:09

## 2019-06-17 RX ADMIN — ACETAMINOPHEN ORAL SOLUTION 650 MG: 650 SOLUTION ORAL at 08:48

## 2019-06-17 RX ADMIN — OXYCODONE HYDROCHLORIDE 10 MG: 5 SOLUTION ORAL at 21:13

## 2019-06-17 RX ADMIN — LACTULOSE 20 G: 20 SOLUTION ORAL at 08:49

## 2019-06-17 RX ADMIN — SODIUM CHLORIDE 1.3 MCG/KG/HR: 9 INJECTION, SOLUTION INTRAVENOUS at 12:17

## 2019-06-17 RX ADMIN — SENNOSIDES 5 ML: 8.8 SYRUP ORAL at 21:17

## 2019-06-17 RX ADMIN — DOCUSATE SODIUM 100 MG: 50 LIQUID ORAL at 08:49

## 2019-06-17 RX ADMIN — Medication 10 ML: at 08:49

## 2019-06-17 RX ADMIN — ACETAMINOPHEN ORAL SOLUTION 650 MG: 650 SOLUTION ORAL at 15:36

## 2019-06-17 ASSESSMENT — PULMONARY FUNCTION TESTS
PIF_VALUE: 28
PIF_VALUE: 26
PIF_VALUE: 39
PIF_VALUE: 32
PIF_VALUE: 25
PIF_VALUE: 34
PIF_VALUE: 30
PIF_VALUE: 30
PIF_VALUE: 27
PIF_VALUE: 38
PIF_VALUE: 28
PIF_VALUE: 30
PIF_VALUE: 32
PIF_VALUE: 27
PIF_VALUE: 43
PIF_VALUE: 30
PIF_VALUE: 26
PIF_VALUE: 27
PIF_VALUE: 39
PIF_VALUE: 47
PIF_VALUE: 28
PIF_VALUE: 29
PIF_VALUE: 31
PIF_VALUE: 27
PIF_VALUE: 29
PIF_VALUE: 39
PIF_VALUE: 30

## 2019-06-17 ASSESSMENT — PAIN SCALES - GENERAL
PAINLEVEL_OUTOF10: 0

## 2019-06-17 NOTE — PROGRESS NOTES
Department of Orthopedic Surgery  Resident Progress Note    Patient is intubated, responds to questions will follow commands. Writes on pad. Patient seen and examined. Pain controlled. No new complaints. Denies numbness, tingling to the LLE. VITALS:  BP (!) 155/85   Pulse 92   Temp 99.2 °F (37.3 °C) (Bladder)   Resp 18   Ht 6' (1.829 m)   Wt (!) 389 lb (176.4 kg)   SpO2 99%   BMI 52.76 kg/m²     GENERAL: alert  MUSCULOSKELETAL:   left lower extremity:  · Dressing C/D/I  · Compartments soft and compressible, calf non-tender  · Palpable dorsalis pedis and posterior tibialis pulse, brisk cap refill to toes, foot warm and perfused  · Sensation intact to light touch in sural/deep peroneal/superficial peroneal/saphenous/posterior tibial nerve distributions to foot/ankle.   · Patient reports decreased sensation to foot compared to contralateral side  · Demonstrates active ankle plantar/dorsiflexion/great toe extension    CBC:   Lab Results   Component Value Date    WBC 18.1 06/17/2019    HGB 9.0 06/17/2019    HCT 27.4 06/17/2019     06/17/2019       ASSESSMENT  · L femur IMN    PLAN    NWB LLE  Pain control per trauma  DVT prophylaxis per trauma  Monitor Labs  PT/OT - when able  D/C planning, SW/PT recs  Secondary once extubated  Discuss with attending
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
## 2019-06-17 NOTE — PROGRESS NOTES
2110-Patient here per bed with RN and resp therapy. Patient tubed, vented and sedated. Gail Hoskins RN here and will stay. 2114- Dr Elham Wallace talks to mother and consent signed. 2120-Patient placed on angio table with monitoring devices, right femoral and pedal pulses palpable. 2125- Right groin prepped and draped. 2136-Dr Elham Wallace here and time out done. 2140-Right artery accessed and angiogram initiated by Dr Elham Wallace. 2155- Right branch of hepatic artery embolized with coils and embospheres. 2200-Completed. Sheath pulled and angio seal used to close arterial puncture. 2210-Puncture site cleansed and dry dressing applied. No bleeding, swelling or complications noted, no change in pulses. Albaro Degroot and resp therapy transported patient back to room. 2230-Family in CT waiting room and will return to SICU.
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
## 2019-06-17 NOTE — BRIEF OP NOTE
Brief Postoperative Note    Mayank Boo  YOB: 1995  [id number]    Pre-operative Diagnosis and Procedure: liver embo    Post-operative Diagnosis: Same    Anesthesia: Local    Estimated Blood Loss: < 10 cc    Surgeon: Germaine ROMAN     Complications: none    Specimen obtained: none     Findings: none     Adeola Vaughn II   6/16/2019 9:16 PM

## 2019-06-17 NOTE — PROGRESS NOTES
Patient placed on psv 15. Respiratory rate increased into the mid 30's with tidal volumes in the 200\"s.  Placed back on ac mode

## 2019-06-17 NOTE — INTERVAL H&P NOTE
H&P Update    Patient's History and Physical  was reviewed. The patient appears likely to able to tolerate the procedure. Risk and benefits discussed including ultimate complications, possibly death and consent obtained.     Lars Stout, II
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
## 2019-06-17 NOTE — CARE COORDINATION
SOCIAL WORK/CASEMANAGEMENT TRANSITION OF CARE PLANNING: pt remains on vent in sicu. If extubated will need pm&R order. PT  And OT saw today with [facility] of 6/24.  Christine Cagle  6/17/2019

## 2019-06-17 NOTE — PLAN OF CARE
Problem: Restraint Use - Nonviolent/Non-Self-Destructive Behavior:  Goal: Absence of restraint-related injury  Description  Absence of restraint-related injury   6/17/2019 0026 by Amelia Fitch RN  Outcome: Met This Shift  6/16/2019 1632 by Chelo Diehl RN  Outcome: Met This Shift
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
## 2019-06-18 ENCOUNTER — APPOINTMENT (OUTPATIENT)
Dept: NUCLEAR MEDICINE | Age: 24
DRG: 003 | End: 2019-06-18
Payer: OTHER MISCELLANEOUS

## 2019-06-18 ENCOUNTER — APPOINTMENT (OUTPATIENT)
Dept: GENERAL RADIOLOGY | Age: 24
DRG: 003 | End: 2019-06-18
Payer: OTHER MISCELLANEOUS

## 2019-06-18 LAB
AADO2: 158.9 MMHG
ALBUMIN SERPL-MCNC: 2.8 G/DL (ref 3.5–5.2)
ALP BLD-CCNC: 163 U/L (ref 40–129)
ALT SERPL-CCNC: 446 U/L (ref 0–40)
ANION GAP SERPL CALCULATED.3IONS-SCNC: 11 MMOL/L (ref 7–16)
ANISOCYTOSIS: ABNORMAL
AST SERPL-CCNC: 191 U/L (ref 0–39)
B.E.: 2.6 MMOL/L (ref -3–3)
BASOPHILIC STIPPLING: ABNORMAL
BASOPHILS ABSOLUTE: 0 E9/L (ref 0–0.2)
BASOPHILS RELATIVE PERCENT: 0.3 % (ref 0–2)
BILIRUB SERPL-MCNC: 3.4 MG/DL (ref 0–1.2)
BUN BLDV-MCNC: 17 MG/DL (ref 6–20)
CALCIUM IONIZED: 1.16 MMOL/L (ref 1.15–1.33)
CALCIUM SERPL-MCNC: 8 MG/DL (ref 8.6–10.2)
CHLORIDE BLD-SCNC: 99 MMOL/L (ref 98–107)
CO2: 27 MMOL/L (ref 22–29)
COHB: 1.5 % (ref 0–1.5)
CREAT SERPL-MCNC: 0.6 MG/DL (ref 0.7–1.2)
CRITICAL: ABNORMAL
DATE ANALYZED: ABNORMAL
DATE OF COLLECTION: ABNORMAL
EOSINOPHILS ABSOLUTE: 0 E9/L (ref 0.05–0.5)
EOSINOPHILS RELATIVE PERCENT: 1.1 % (ref 0–6)
FIO2: 40 %
GFR AFRICAN AMERICAN: >60
GFR NON-AFRICAN AMERICAN: >60 ML/MIN/1.73
GLUCOSE BLD-MCNC: 151 MG/DL (ref 74–99)
HCO3: 26.8 MMOL/L (ref 22–26)
HCT VFR BLD CALC: 26.2 % (ref 37–54)
HEMOGLOBIN: 8.2 G/DL (ref 12.5–16.5)
HHB: 5.4 % (ref 0–5)
HYPOCHROMIA: ABNORMAL
INR BLD: 1.3
LAB: ABNORMAL
LYMPHOCYTES ABSOLUTE: 0.8 E9/L (ref 1.5–4)
LYMPHOCYTES RELATIVE PERCENT: 3.5 % (ref 20–42)
Lab: ABNORMAL
MAGNESIUM: 2.2 MG/DL (ref 1.6–2.6)
MCH RBC QN AUTO: 27.4 PG (ref 26–35)
MCHC RBC AUTO-ENTMCNC: 31.3 % (ref 32–34.5)
MCV RBC AUTO: 87.6 FL (ref 80–99.9)
METHB: 0.4 % (ref 0–1.5)
MODE: AC
MONOCYTES ABSOLUTE: 1.59 E9/L (ref 0.1–0.95)
MONOCYTES RELATIVE PERCENT: 7.8 % (ref 2–12)
MYELOCYTE PERCENT: 1.7 % (ref 0–0)
NEUTROPHILS ABSOLUTE: 17.71 E9/L (ref 1.8–7.3)
NEUTROPHILS RELATIVE PERCENT: 87 % (ref 43–80)
O2 CONTENT: 12.1 ML/DL
O2 SATURATION: 94.5 % (ref 92–98.5)
O2HB: 92.7 % (ref 94–97)
OPERATOR ID: ABNORMAL
OVALOCYTES: ABNORMAL
PATIENT TEMP: 37 C
PCO2: 39.3 MMHG (ref 35–45)
PDW BLD-RTO: 15.5 FL (ref 11.5–15)
PEEP/CPAP: 8 CMH2O
PFO2: 1.78 MMHG/%
PH BLOOD GAS: 7.45 (ref 7.35–7.45)
PHOSPHORUS: 2.9 MG/DL (ref 2.5–4.5)
PLATELET # BLD: 228 E9/L (ref 130–450)
PMV BLD AUTO: 9.5 FL (ref 7–12)
PO2: 71.1 MMHG (ref 60–100)
POIKILOCYTES: ABNORMAL
POLYCHROMASIA: ABNORMAL
POTASSIUM SERPL-SCNC: 4 MMOL/L (ref 3.5–5)
PROTHROMBIN TIME: 14.8 SEC (ref 9.3–12.4)
RBC # BLD: 2.99 E12/L (ref 3.8–5.8)
RI(T): 224 %
RR MECHANICAL: 16 B/MIN
SODIUM BLD-SCNC: 137 MMOL/L (ref 132–146)
SOURCE, BLOOD GAS: ABNORMAL
THB: 9.2 G/DL (ref 11.5–16.5)
TIME ANALYZED: 432
TOTAL CK: 2222 U/L (ref 20–200)
TOTAL PROTEIN: 5.8 G/DL (ref 6.4–8.3)
VANCOMYCIN RANDOM: 7.9 MCG/ML (ref 5–40)
VT MECHANICAL: 500 ML
WBC # BLD: 19.9 E9/L (ref 4.5–11.5)

## 2019-06-18 PROCEDURE — 6360000002 HC RX W HCPCS: Performed by: STUDENT IN AN ORGANIZED HEALTH CARE EDUCATION/TRAINING PROGRAM

## 2019-06-18 PROCEDURE — 2580000003 HC RX 258: Performed by: SURGERY

## 2019-06-18 PROCEDURE — 6370000000 HC RX 637 (ALT 250 FOR IP): Performed by: STUDENT IN AN ORGANIZED HEALTH CARE EDUCATION/TRAINING PROGRAM

## 2019-06-18 PROCEDURE — 83735 ASSAY OF MAGNESIUM: CPT

## 2019-06-18 PROCEDURE — 94640 AIRWAY INHALATION TREATMENT: CPT

## 2019-06-18 PROCEDURE — 84100 ASSAY OF PHOSPHORUS: CPT

## 2019-06-18 PROCEDURE — 2580000003 HC RX 258

## 2019-06-18 PROCEDURE — 3430000000 HC RX DIAGNOSTIC RADIOPHARMACEUTICAL: Performed by: RADIOLOGY

## 2019-06-18 PROCEDURE — 6360000002 HC RX W HCPCS

## 2019-06-18 PROCEDURE — 2580000003 HC RX 258: Performed by: STUDENT IN AN ORGANIZED HEALTH CARE EDUCATION/TRAINING PROGRAM

## 2019-06-18 PROCEDURE — 94003 VENT MGMT INPAT SUBQ DAY: CPT

## 2019-06-18 PROCEDURE — 2000000000 HC ICU R&B

## 2019-06-18 PROCEDURE — 80053 COMPREHEN METABOLIC PANEL: CPT

## 2019-06-18 PROCEDURE — 36415 COLL VENOUS BLD VENIPUNCTURE: CPT

## 2019-06-18 PROCEDURE — 82550 ASSAY OF CK (CPK): CPT

## 2019-06-18 PROCEDURE — 80202 ASSAY OF VANCOMYCIN: CPT

## 2019-06-18 PROCEDURE — 99291 CRITICAL CARE FIRST HOUR: CPT | Performed by: SURGERY

## 2019-06-18 PROCEDURE — 97530 THERAPEUTIC ACTIVITIES: CPT

## 2019-06-18 PROCEDURE — 6370000000 HC RX 637 (ALT 250 FOR IP): Performed by: SURGERY

## 2019-06-18 PROCEDURE — 97535 SELF CARE MNGMENT TRAINING: CPT

## 2019-06-18 PROCEDURE — 85025 COMPLETE CBC W/AUTO DIFF WBC: CPT

## 2019-06-18 PROCEDURE — 85610 PROTHROMBIN TIME: CPT

## 2019-06-18 PROCEDURE — A9537 TC99M MEBROFENIN: HCPCS | Performed by: RADIOLOGY

## 2019-06-18 PROCEDURE — 71045 X-RAY EXAM CHEST 1 VIEW: CPT

## 2019-06-18 PROCEDURE — 82805 BLOOD GASES W/O2 SATURATION: CPT

## 2019-06-18 PROCEDURE — 82330 ASSAY OF CALCIUM: CPT

## 2019-06-18 PROCEDURE — 78226 HEPATOBILIARY SYSTEM IMAGING: CPT

## 2019-06-18 PROCEDURE — 97110 THERAPEUTIC EXERCISES: CPT

## 2019-06-18 PROCEDURE — 2500000003 HC RX 250 WO HCPCS: Performed by: SURGERY

## 2019-06-18 RX ADMIN — CEFEPIME HYDROCHLORIDE 2 G: 2 INJECTION, POWDER, FOR SOLUTION INTRAVENOUS at 09:09

## 2019-06-18 RX ADMIN — IPRATROPIUM BROMIDE AND ALBUTEROL SULFATE 1 AMPULE: .5; 3 SOLUTION RESPIRATORY (INHALATION) at 14:00

## 2019-06-18 RX ADMIN — SODIUM CHLORIDE 1 MCG/KG/HR: 9 INJECTION, SOLUTION INTRAVENOUS at 01:32

## 2019-06-18 RX ADMIN — VANCOMYCIN HYDROCHLORIDE 1750 MG: 10 INJECTION, POWDER, LYOPHILIZED, FOR SOLUTION INTRAVENOUS at 15:31

## 2019-06-18 RX ADMIN — Medication 9 MILLICURIE: at 11:01

## 2019-06-18 RX ADMIN — ENOXAPARIN SODIUM 40 MG: 40 INJECTION, SOLUTION INTRAVENOUS; SUBCUTANEOUS at 08:57

## 2019-06-18 RX ADMIN — BACITRACIN ZINC: 500 OINTMENT TOPICAL at 08:58

## 2019-06-18 RX ADMIN — IPRATROPIUM BROMIDE AND ALBUTEROL SULFATE 1 AMPULE: .5; 3 SOLUTION RESPIRATORY (INHALATION) at 15:37

## 2019-06-18 RX ADMIN — DOCUSATE SODIUM 100 MG: 50 LIQUID ORAL at 21:14

## 2019-06-18 RX ADMIN — ACETAMINOPHEN ORAL SOLUTION 650 MG: 650 SOLUTION ORAL at 04:13

## 2019-06-18 RX ADMIN — OXYCODONE HYDROCHLORIDE 10 MG: 5 SOLUTION ORAL at 21:15

## 2019-06-18 RX ADMIN — SENNOSIDES 5 ML: 8.8 SYRUP ORAL at 21:14

## 2019-06-18 RX ADMIN — Medication 10 ML: at 21:15

## 2019-06-18 RX ADMIN — Medication 10 ML: at 08:58

## 2019-06-18 RX ADMIN — SODIUM CHLORIDE 1 MCG/KG/HR: 9 INJECTION, SOLUTION INTRAVENOUS at 11:30

## 2019-06-18 RX ADMIN — SODIUM CHLORIDE 1 MCG/KG/HR: 9 INJECTION, SOLUTION INTRAVENOUS at 08:56

## 2019-06-18 RX ADMIN — LACTULOSE 20 G: 20 SOLUTION ORAL at 08:56

## 2019-06-18 RX ADMIN — SODIUM CHLORIDE 1 MCG/KG/HR: 9 INJECTION, SOLUTION INTRAVENOUS at 16:23

## 2019-06-18 RX ADMIN — SODIUM CHLORIDE 1 MCG/KG/HR: 9 INJECTION, SOLUTION INTRAVENOUS at 23:29

## 2019-06-18 RX ADMIN — SODIUM CHLORIDE, POTASSIUM CHLORIDE, SODIUM LACTATE AND CALCIUM CHLORIDE: 600; 310; 30; 20 INJECTION, SOLUTION INTRAVENOUS at 21:12

## 2019-06-18 RX ADMIN — OXYCODONE HYDROCHLORIDE 10 MG: 5 SOLUTION ORAL at 15:30

## 2019-06-18 RX ADMIN — IPRATROPIUM BROMIDE AND ALBUTEROL SULFATE 1 AMPULE: .5; 3 SOLUTION RESPIRATORY (INHALATION) at 19:43

## 2019-06-18 RX ADMIN — SODIUM CHLORIDE 1 MCG/KG/HR: 9 INJECTION, SOLUTION INTRAVENOUS at 06:37

## 2019-06-18 RX ADMIN — VANCOMYCIN HYDROCHLORIDE 1750 MG: 10 INJECTION, POWDER, LYOPHILIZED, FOR SOLUTION INTRAVENOUS at 21:41

## 2019-06-18 RX ADMIN — SODIUM CHLORIDE 1 MCG/KG/HR: 9 INJECTION, SOLUTION INTRAVENOUS at 04:10

## 2019-06-18 RX ADMIN — METHOCARBAMOL TABLETS 1000 MG: 500 TABLET, COATED ORAL at 15:32

## 2019-06-18 RX ADMIN — ENOXAPARIN SODIUM 40 MG: 40 INJECTION, SOLUTION INTRAVENOUS; SUBCUTANEOUS at 21:14

## 2019-06-18 RX ADMIN — SODIUM CHLORIDE, POTASSIUM CHLORIDE, SODIUM LACTATE AND CALCIUM CHLORIDE: 600; 310; 30; 20 INJECTION, SOLUTION INTRAVENOUS at 08:56

## 2019-06-18 RX ADMIN — LACTULOSE 20 G: 20 SOLUTION ORAL at 21:14

## 2019-06-18 RX ADMIN — Medication 10 ML: at 05:59

## 2019-06-18 RX ADMIN — BACITRACIN ZINC: 500 OINTMENT TOPICAL at 21:14

## 2019-06-18 RX ADMIN — VANCOMYCIN HYDROCHLORIDE 1750 MG: 10 INJECTION, POWDER, LYOPHILIZED, FOR SOLUTION INTRAVENOUS at 04:13

## 2019-06-18 RX ADMIN — OXYCODONE HYDROCHLORIDE 10 MG: 5 SOLUTION ORAL at 08:56

## 2019-06-18 RX ADMIN — METHOCARBAMOL TABLETS 1000 MG: 500 TABLET, COATED ORAL at 08:58

## 2019-06-18 RX ADMIN — SODIUM CHLORIDE 1 MCG/KG/HR: 9 INJECTION, SOLUTION INTRAVENOUS at 18:54

## 2019-06-18 RX ADMIN — CEFEPIME HYDROCHLORIDE 2 G: 2 INJECTION, POWDER, FOR SOLUTION INTRAVENOUS at 01:49

## 2019-06-18 RX ADMIN — CHLORHEXIDINE GLUCONATE 0.12% ORAL RINSE 15 ML: 1.2 LIQUID ORAL at 08:57

## 2019-06-18 RX ADMIN — METHOCARBAMOL TABLETS 1000 MG: 500 TABLET, COATED ORAL at 04:14

## 2019-06-18 RX ADMIN — Medication 6 MG: at 06:39

## 2019-06-18 RX ADMIN — DOCUSATE SODIUM 100 MG: 50 LIQUID ORAL at 08:57

## 2019-06-18 RX ADMIN — OXYCODONE HYDROCHLORIDE 10 MG: 5 SOLUTION ORAL at 04:13

## 2019-06-18 RX ADMIN — METHOCARBAMOL TABLETS 1000 MG: 500 TABLET, COATED ORAL at 21:16

## 2019-06-18 RX ADMIN — SODIUM CHLORIDE 1 MCG/KG/HR: 9 INJECTION, SOLUTION INTRAVENOUS at 13:43

## 2019-06-18 RX ADMIN — SODIUM CHLORIDE 1 MCG/KG/HR: 9 INJECTION, SOLUTION INTRAVENOUS at 21:11

## 2019-06-18 RX ADMIN — IBUPROFEN 800 MG: 200 SUSPENSION ORAL at 08:57

## 2019-06-18 RX ADMIN — IPRATROPIUM BROMIDE AND ALBUTEROL SULFATE 1 AMPULE: .5; 3 SOLUTION RESPIRATORY (INHALATION) at 07:52

## 2019-06-18 RX ADMIN — CHLORHEXIDINE GLUCONATE 0.12% ORAL RINSE 15 ML: 1.2 LIQUID ORAL at 21:14

## 2019-06-18 RX ADMIN — BACITRACIN ZINC: 500 OINTMENT TOPICAL at 15:31

## 2019-06-18 RX ADMIN — PANTOPRAZOLE SODIUM 40 MG: 40 GRANULE, DELAYED RELEASE ORAL at 05:59

## 2019-06-18 RX ADMIN — CEFEPIME HYDROCHLORIDE 2 G: 2 INJECTION, POWDER, FOR SOLUTION INTRAVENOUS at 17:58

## 2019-06-18 ASSESSMENT — PULMONARY FUNCTION TESTS
PIF_VALUE: 33
PIF_VALUE: 32
PIF_VALUE: 30
PIF_VALUE: 30
PIF_VALUE: 40
PIF_VALUE: 31
PIF_VALUE: 44
PIF_VALUE: 38
PIF_VALUE: 35
PIF_VALUE: 34
PIF_VALUE: 8
PIF_VALUE: 32
PIF_VALUE: 28
PIF_VALUE: 41
PIF_VALUE: 41
PIF_VALUE: 37
PIF_VALUE: 30
PIF_VALUE: 41
PIF_VALUE: 30
PIF_VALUE: 52
PIF_VALUE: 31
PIF_VALUE: 37
PIF_VALUE: 45
PIF_VALUE: 32
PIF_VALUE: 30
PIF_VALUE: 29
PIF_VALUE: 8
PIF_VALUE: 8
PIF_VALUE: 18
PIF_VALUE: 32
PIF_VALUE: 33

## 2019-06-18 ASSESSMENT — PAIN SCALES - GENERAL
PAINLEVEL_OUTOF10: 0
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
## 2019-06-18 NOTE — PLAN OF CARE
Problem: Inadequate energy intake (NI-1.4)  Goal: Food and/or Nutrient Delivery  Description  Individualized approach for food/nutrient provision.   Outcome: Ongoing

## 2019-06-18 NOTE — PROGRESS NOTES
able to open if cued. Does respond with gestures and yes/no head nods; writing with F legibility  (per eval)              Memory: F              Sequencing: P+              Problem solving: P+              Judgement/safety: P+                RASS: -1  CAM-ICU: Negative                Functional Assessment:    Initial Eval Status  Date: 6/17/19 Treatment Status  Date: 6/18/19 Short Term Goals  Treatment frequency: 1-3x/week on ICU; PRN on stepdown unit  -pt will improve. .. Feeding NPO  NPO     Indep  Sitting upright in chair for majority of meals; pending cleared diet restriction   Grooming Mod A overall     SBA to wash face/wipe eyes at bed level     Max A oral suction use d/t secretions     Mod A overall     SBA to wash face/wipe eyes at bed level     Max A oral suction use d/t excessive secretions     Supervision  while seated EOB; G BUE functional use; no LOB      UB Dressing Max A     Max A  Donning gown at bed level semi-reclined position, able to lift each arm off bedside but assist to manage gown fully up to each shoulder     Supervision  while seated EOB; G BUE functional use; no LOB   LB Dressing DEP  Donning socks only  DEP  Donning socks only    Min A   with AE/device PRN   Bathing UB-  Mod A  LB-  Max A NT     UB-  Sup  LB-  Mod A with AE/DME prn   Toileting DEP  Davis; bed level  DEP     Min A  including clothing mgmt and toileting hygiene using DME/device prn   Bed Mobility  Supine to sit: Max A x2 with elevated HOB  Sit to supine: Max A x2  Rolling: NT Supine to sit:  Max A x2 with elevated HOB  Sit to supine: Max A x2  Rolling: NT   Min A   in prep for EOB ADL tasks   Functional/  Bathroom  Transfers Sit to stand: NT  Stand to sit: NT  Stand pivot: NT Sit to stand: NT  Stand to sit: NT  Stand pivot: NT   Mod A  from varying surfaces using device/DME prn;      good adherence to NWB LLE   Functional Mobility NT  NT  Mod A   Bed <> bathroom distance using device (per PT recommendation) prn     good adherence to NWB LLE   Balance Sitting:     Static:  Mod A    Dynamic:Max A  Standing:     Static:  NT    Dynamic:NT  Sitting:     Static:  Mod/Max A    Dynamic:Max A  Standing:     Static:  NT    Dynamic:NT demo Supervision dynamic sitting balance EOB during ADL tasks; Mod A dynamic standing balance during ADL tasks using device prn   Endurance/  Activity Tolerance P+ activity tolerance/endurance during light ADL task ; sitting tolerance EOB ~5 minutes   P+ activity tolerance/endurance during light ADL task ; sitting tolerance EOB ~5 minutes     Max cues to take slow controlled deep breathing d/t increased RR and multiple secretions. RR ranged in 35s breathes/min demo G activity tolerance/endurance during [id number] min ADL task      G demo of EC, pacing and proper breathing techniques   Visual/  Perceptual Glasses: none     Pt with F+ visual attn; F smooth visual scanning; jumpiness noted L eye.      L eye strabismus exotropia (L lateral/outward turning eye)     Able to duplicate number of fingers in front     F+ accuracy with line bisection test with mod VC to scan L side of paper to locate    See below      Safety P        P+    Pt approached with 2PR however did not pull at any lines during session  G    BUE strength/endurance See below  See below  Good tolerance to BUE AAROM/AROM exercise to improve overall use during ADLs and functional mobility       Hand dominance: right       Strength ROM  Comments   RUE  Proximal: NT d/t sternoclavicular joint distraction  Distal: 4+/5 Proximal:  -PROM: WFL   -AROM: WFL  Distal: WFL G   F FMC/dexterity noted during ADL tasks   LUE Proximal: 4-/5  Distal: 4+/5 Proximal:  -PROM: WFL   -AROM: WFL  Distal: WFL G   F FMC/dexterity noted during ADL tasks        Comments: Elen Tipton from RN to see pt. Upon arrival pt supine in bed and agreeable to session with PT collaboration. Vitals monitored continuously during session. Extensive line mgmt required.  Pt's mother and girlfriend present during session and educated on role of OT and goals/purpose of therapy session. Good understanding. Required max VC to keep eyes open during session. Pt displays good ability to follow through with all instruction despite keeping eyes closed. Reports no pain. Completion of RUE AAROM into shoulder flexion x10 reps with minimal to no assist and no report of pain. Required DEP x2 for repositioning at bed level prior to sitting EOB. Increased secretions noted when placed in upright chair position; required oral suction and x1 deep suction. Max VC for pacing and proper breathing techniques to decrease RR with good results to ~upper 20s breathes/min. Max VC for sequencing and techniques for supine to sit EOB with max A x2. Tolerated sitting EOB ~5 minutes with focus on LUE AAROM exercise into shoulder flexion x10 reps; B scapular retraction x5 reps with 2-3 second hold to increase upright sitting posture for improved breathing capacity; and washing face as stated above. Intermittently opens eyes. Multiple oral suctions required. Max A for sitting balance. Pt increasingly more fatigued; assisted back into supine position with max A x2. Placed in semi-chair position to improve interaction with environment, increase upright positioning for ADLs and improve overall breathing. Prior to and at the end of session, environmental modifications/line management completed for patients safety and efficiency of treatment session. RN present. Pt required cues and education as noted above for safe facilitation and completion of tasks. During bed mobility and EOB activity, pt educated on proper hand placement, safety technique, sequencing, and energy conservation/pacing, breathing techniques.  Mother/girlfriend additionally educated on OT POC/progression, OT role, importance of EOB activity and completing ADL tasks daily as IND as possible to aide in recovery process, fall risk precautions, and proper positioning in
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
## 2019-06-18 NOTE — FLOWSHEET NOTE
Patient continues to pull at lines and tubes. Education provided to patient, no evidence of learning at this time. Will continue to monitor need for restraints.

## 2019-06-19 ENCOUNTER — APPOINTMENT (OUTPATIENT)
Dept: GENERAL RADIOLOGY | Age: 24
DRG: 003 | End: 2019-06-19
Payer: OTHER MISCELLANEOUS

## 2019-06-19 LAB
AADO2: 165.6 MMHG
ALBUMIN SERPL-MCNC: 2.3 G/DL (ref 3.5–5.2)
ALP BLD-CCNC: 161 U/L (ref 40–129)
ALT SERPL-CCNC: 315 U/L (ref 0–40)
ANION GAP SERPL CALCULATED.3IONS-SCNC: 10 MMOL/L (ref 7–16)
AST SERPL-CCNC: 97 U/L (ref 0–39)
B.E.: 1.7 MMOL/L (ref -3–3)
BASOPHILS ABSOLUTE: 0 E9/L (ref 0–0.2)
BASOPHILS RELATIVE PERCENT: 0.2 % (ref 0–2)
BILIRUB SERPL-MCNC: 2.6 MG/DL (ref 0–1.2)
BUN BLDV-MCNC: 15 MG/DL (ref 6–20)
CALCIUM IONIZED: 1.15 MMOL/L (ref 1.15–1.33)
CALCIUM SERPL-MCNC: 8.2 MG/DL (ref 8.6–10.2)
CHLORIDE BLD-SCNC: 99 MMOL/L (ref 98–107)
CO2: 27 MMOL/L (ref 22–29)
COHB: 1.8 % (ref 0–1.5)
CREAT SERPL-MCNC: 0.5 MG/DL (ref 0.7–1.2)
CRITICAL: ABNORMAL
DATE ANALYZED: ABNORMAL
DATE OF COLLECTION: ABNORMAL
EOSINOPHILS ABSOLUTE: 0.15 E9/L (ref 0.05–0.5)
EOSINOPHILS RELATIVE PERCENT: 0.9 % (ref 0–6)
FIO2: 40 %
GFR AFRICAN AMERICAN: >60
GFR NON-AFRICAN AMERICAN: >60 ML/MIN/1.73
GLUCOSE BLD-MCNC: 135 MG/DL (ref 74–99)
HCO3: 25.2 MMOL/L (ref 22–26)
HCT VFR BLD CALC: 24.4 % (ref 37–54)
HEMOGLOBIN: 7.8 G/DL (ref 12.5–16.5)
HHB: 5.8 % (ref 0–5)
HYPOCHROMIA: ABNORMAL
LAB: ABNORMAL
LYMPHOCYTES ABSOLUTE: 0.65 E9/L (ref 1.5–4)
LYMPHOCYTES RELATIVE PERCENT: 4.4 % (ref 20–42)
Lab: ABNORMAL
MAGNESIUM: 2.2 MG/DL (ref 1.6–2.6)
MCH RBC QN AUTO: 28.1 PG (ref 26–35)
MCHC RBC AUTO-ENTMCNC: 32 % (ref 32–34.5)
MCV RBC AUTO: 87.8 FL (ref 80–99.9)
METHB: 0.5 % (ref 0–1.5)
MODE: AC
MONOCYTES ABSOLUTE: 0.82 E9/L (ref 0.1–0.95)
MONOCYTES RELATIVE PERCENT: 5.3 % (ref 2–12)
NEUTROPHILS ABSOLUTE: 14.67 E9/L (ref 1.8–7.3)
NEUTROPHILS RELATIVE PERCENT: 89.5 % (ref 43–80)
O2 SATURATION: 94.1 % (ref 92–98.5)
O2HB: 91.9 % (ref 94–97)
OPERATOR ID: 7490
OVALOCYTES: ABNORMAL
PATIENT TEMP: 37 C
PCO2: 35 MMHG (ref 35–45)
PDW BLD-RTO: 15.6 FL (ref 11.5–15)
PFO2: 1.74 MMHG/%
PH BLOOD GAS: 7.48 (ref 7.35–7.45)
PHOSPHORUS: 2.5 MG/DL (ref 2.5–4.5)
PLATELET # BLD: 279 E9/L (ref 130–450)
PMV BLD AUTO: 9.5 FL (ref 7–12)
PO2: 69.4 MMHG (ref 60–100)
POIKILOCYTES: ABNORMAL
POLYCHROMASIA: ABNORMAL
POTASSIUM SERPL-SCNC: 3.8 MMOL/L (ref 3.5–5)
RBC # BLD: 2.78 E12/L (ref 3.8–5.8)
RI(T): 2.39
RR MECHANICAL: 16 B/MIN
SCHISTOCYTES: ABNORMAL
SODIUM BLD-SCNC: 136 MMOL/L (ref 132–146)
SOURCE, BLOOD GAS: ABNORMAL
TARGET CELLS: ABNORMAL
THB: 8.7 G/DL (ref 11.5–16.5)
TIME ANALYZED: 653
TOTAL CK: 1317 U/L (ref 20–200)
TOTAL PROTEIN: 5.9 G/DL (ref 6.4–8.3)
URINE CULTURE, ROUTINE: NORMAL
VANCOMYCIN TROUGH: 10.4 MCG/ML (ref 5–16)
VT MECHANICAL: 500 ML
WBC # BLD: 16.3 E9/L (ref 4.5–11.5)

## 2019-06-19 PROCEDURE — 6370000000 HC RX 637 (ALT 250 FOR IP): Performed by: SURGERY

## 2019-06-19 PROCEDURE — 97110 THERAPEUTIC EXERCISES: CPT

## 2019-06-19 PROCEDURE — 6370000000 HC RX 637 (ALT 250 FOR IP): Performed by: STUDENT IN AN ORGANIZED HEALTH CARE EDUCATION/TRAINING PROGRAM

## 2019-06-19 PROCEDURE — 2580000003 HC RX 258

## 2019-06-19 PROCEDURE — 6360000002 HC RX W HCPCS: Performed by: STUDENT IN AN ORGANIZED HEALTH CARE EDUCATION/TRAINING PROGRAM

## 2019-06-19 PROCEDURE — 2580000003 HC RX 258: Performed by: STUDENT IN AN ORGANIZED HEALTH CARE EDUCATION/TRAINING PROGRAM

## 2019-06-19 PROCEDURE — 97530 THERAPEUTIC ACTIVITIES: CPT

## 2019-06-19 PROCEDURE — 99291 CRITICAL CARE FIRST HOUR: CPT | Performed by: SURGERY

## 2019-06-19 PROCEDURE — 2000000000 HC ICU R&B

## 2019-06-19 PROCEDURE — 2580000003 HC RX 258: Performed by: SURGERY

## 2019-06-19 PROCEDURE — 80202 ASSAY OF VANCOMYCIN: CPT

## 2019-06-19 PROCEDURE — 84100 ASSAY OF PHOSPHORUS: CPT

## 2019-06-19 PROCEDURE — 83735 ASSAY OF MAGNESIUM: CPT

## 2019-06-19 PROCEDURE — 82805 BLOOD GASES W/O2 SATURATION: CPT

## 2019-06-19 PROCEDURE — 80053 COMPREHEN METABOLIC PANEL: CPT

## 2019-06-19 PROCEDURE — 2500000003 HC RX 250 WO HCPCS: Performed by: SURGERY

## 2019-06-19 PROCEDURE — 36415 COLL VENOUS BLD VENIPUNCTURE: CPT

## 2019-06-19 PROCEDURE — 94640 AIRWAY INHALATION TREATMENT: CPT

## 2019-06-19 PROCEDURE — 94003 VENT MGMT INPAT SUBQ DAY: CPT

## 2019-06-19 PROCEDURE — 85025 COMPLETE CBC W/AUTO DIFF WBC: CPT

## 2019-06-19 PROCEDURE — 82330 ASSAY OF CALCIUM: CPT

## 2019-06-19 PROCEDURE — 71045 X-RAY EXAM CHEST 1 VIEW: CPT

## 2019-06-19 PROCEDURE — 6360000002 HC RX W HCPCS

## 2019-06-19 PROCEDURE — 82550 ASSAY OF CK (CPK): CPT

## 2019-06-19 RX ORDER — CLONIDINE HYDROCHLORIDE 0.1 MG/1
0.1 TABLET ORAL EVERY 8 HOURS SCHEDULED
Status: DISCONTINUED | OUTPATIENT
Start: 2019-06-19 | End: 2019-06-28 | Stop reason: HOSPADM

## 2019-06-19 RX ADMIN — METHOCARBAMOL TABLETS 1000 MG: 500 TABLET, COATED ORAL at 04:03

## 2019-06-19 RX ADMIN — DOCUSATE SODIUM 100 MG: 50 LIQUID ORAL at 08:21

## 2019-06-19 RX ADMIN — METHOCARBAMOL TABLETS 1000 MG: 500 TABLET, COATED ORAL at 15:49

## 2019-06-19 RX ADMIN — SODIUM CHLORIDE 1.2 MCG/KG/HR: 9 INJECTION, SOLUTION INTRAVENOUS at 10:58

## 2019-06-19 RX ADMIN — IPRATROPIUM BROMIDE AND ALBUTEROL SULFATE 1 AMPULE: .5; 3 SOLUTION RESPIRATORY (INHALATION) at 13:27

## 2019-06-19 RX ADMIN — Medication 10 ML: at 21:01

## 2019-06-19 RX ADMIN — Medication 10 ML: at 08:23

## 2019-06-19 RX ADMIN — SODIUM CHLORIDE 1 MCG/KG/HR: 9 INJECTION, SOLUTION INTRAVENOUS at 05:19

## 2019-06-19 RX ADMIN — CHLORHEXIDINE GLUCONATE 0.12% ORAL RINSE 15 ML: 1.2 LIQUID ORAL at 08:21

## 2019-06-19 RX ADMIN — SODIUM CHLORIDE 1 MCG/KG/HR: 9 INJECTION, SOLUTION INTRAVENOUS at 02:19

## 2019-06-19 RX ADMIN — SODIUM CHLORIDE 0.6 MCG/KG/HR: 9 INJECTION, SOLUTION INTRAVENOUS at 18:06

## 2019-06-19 RX ADMIN — ENOXAPARIN SODIUM 40 MG: 40 INJECTION, SOLUTION INTRAVENOUS; SUBCUTANEOUS at 08:22

## 2019-06-19 RX ADMIN — LACTULOSE 20 G: 20 SOLUTION ORAL at 21:01

## 2019-06-19 RX ADMIN — CEFEPIME HYDROCHLORIDE 2 G: 2 INJECTION, POWDER, FOR SOLUTION INTRAVENOUS at 02:15

## 2019-06-19 RX ADMIN — PANTOPRAZOLE SODIUM 40 MG: 40 GRANULE, DELAYED RELEASE ORAL at 06:14

## 2019-06-19 RX ADMIN — OXYCODONE HYDROCHLORIDE 10 MG: 5 SOLUTION ORAL at 10:05

## 2019-06-19 RX ADMIN — IPRATROPIUM BROMIDE AND ALBUTEROL SULFATE 1 AMPULE: .5; 3 SOLUTION RESPIRATORY (INHALATION) at 08:01

## 2019-06-19 RX ADMIN — CEFEPIME HYDROCHLORIDE 2 G: 2 INJECTION, POWDER, FOR SOLUTION INTRAVENOUS at 10:05

## 2019-06-19 RX ADMIN — ENOXAPARIN SODIUM 40 MG: 40 INJECTION, SOLUTION INTRAVENOUS; SUBCUTANEOUS at 21:01

## 2019-06-19 RX ADMIN — METHOCARBAMOL TABLETS 1000 MG: 500 TABLET, COATED ORAL at 22:24

## 2019-06-19 RX ADMIN — BACITRACIN ZINC: 500 OINTMENT TOPICAL at 21:01

## 2019-06-19 RX ADMIN — LACTULOSE 20 G: 20 SOLUTION ORAL at 08:21

## 2019-06-19 RX ADMIN — Medication: at 01:32

## 2019-06-19 RX ADMIN — IBUPROFEN 800 MG: 200 SUSPENSION ORAL at 21:00

## 2019-06-19 RX ADMIN — BACITRACIN ZINC: 500 OINTMENT TOPICAL at 13:47

## 2019-06-19 RX ADMIN — OXYCODONE HYDROCHLORIDE 10 MG: 5 SOLUTION ORAL at 04:03

## 2019-06-19 RX ADMIN — SODIUM CHLORIDE 1.2 MCG/KG/HR: 9 INJECTION, SOLUTION INTRAVENOUS at 08:51

## 2019-06-19 RX ADMIN — SODIUM CHLORIDE 1.2 MCG/KG/HR: 9 INJECTION, SOLUTION INTRAVENOUS at 13:17

## 2019-06-19 RX ADMIN — IPRATROPIUM BROMIDE AND ALBUTEROL SULFATE 1 AMPULE: .5; 3 SOLUTION RESPIRATORY (INHALATION) at 16:28

## 2019-06-19 RX ADMIN — BACITRACIN ZINC: 500 OINTMENT TOPICAL at 08:23

## 2019-06-19 RX ADMIN — ACETAMINOPHEN ORAL SOLUTION 650 MG: 650 SOLUTION ORAL at 15:51

## 2019-06-19 RX ADMIN — CEFAZOLIN 3 G: 10 INJECTION, POWDER, FOR SOLUTION INTRAVENOUS; PARENTERAL at 18:06

## 2019-06-19 RX ADMIN — SODIUM CHLORIDE 1.2 MCG/KG/HR: 9 INJECTION, SOLUTION INTRAVENOUS at 15:15

## 2019-06-19 RX ADMIN — SENNOSIDES 5 ML: 8.8 SYRUP ORAL at 21:01

## 2019-06-19 RX ADMIN — DOCUSATE SODIUM 100 MG: 50 LIQUID ORAL at 21:01

## 2019-06-19 RX ADMIN — CLONIDINE HYDROCHLORIDE 0.1 MG: 0.1 TABLET ORAL at 14:39

## 2019-06-19 RX ADMIN — OXYCODONE HYDROCHLORIDE 10 MG: 5 SOLUTION ORAL at 15:49

## 2019-06-19 RX ADMIN — OXYCODONE HYDROCHLORIDE 10 MG: 5 SOLUTION ORAL at 20:59

## 2019-06-19 RX ADMIN — Medication: at 20:30

## 2019-06-19 RX ADMIN — CLONIDINE HYDROCHLORIDE 0.1 MG: 0.1 TABLET ORAL at 22:24

## 2019-06-19 RX ADMIN — ACETAMINOPHEN ORAL SOLUTION 650 MG: 650 SOLUTION ORAL at 08:22

## 2019-06-19 RX ADMIN — IPRATROPIUM BROMIDE AND ALBUTEROL SULFATE 1 AMPULE: .5; 3 SOLUTION RESPIRATORY (INHALATION) at 19:54

## 2019-06-19 RX ADMIN — METHOCARBAMOL TABLETS 1000 MG: 500 TABLET, COATED ORAL at 10:06

## 2019-06-19 RX ADMIN — VANCOMYCIN HYDROCHLORIDE 1750 MG: 10 INJECTION, POWDER, LYOPHILIZED, FOR SOLUTION INTRAVENOUS at 06:14

## 2019-06-19 RX ADMIN — CHLORHEXIDINE GLUCONATE 0.12% ORAL RINSE 15 ML: 1.2 LIQUID ORAL at 21:01

## 2019-06-19 RX ADMIN — SODIUM CHLORIDE 1.2 MCG/KG/HR: 9 INJECTION, SOLUTION INTRAVENOUS at 06:47

## 2019-06-19 RX ADMIN — MAGNESIUM HYDROXIDE 30 ML: 400 SUSPENSION ORAL at 06:18

## 2019-06-19 ASSESSMENT — PULMONARY FUNCTION TESTS
PIF_VALUE: 26
PIF_VALUE: 26
PIF_VALUE: 28
PIF_VALUE: 27
PIF_VALUE: 27
PIF_VALUE: 33
PIF_VALUE: 36
PIF_VALUE: 27
PIF_VALUE: 26
PIF_VALUE: 39
PIF_VALUE: 27
PIF_VALUE: 27
PIF_VALUE: 37
PIF_VALUE: 42
PIF_VALUE: 47
PIF_VALUE: 26
PIF_VALUE: 27
PIF_VALUE: 40
PIF_VALUE: 26
PIF_VALUE: 27
PIF_VALUE: 35
PIF_VALUE: 41
PIF_VALUE: 47
PIF_VALUE: 43
PIF_VALUE: 32

## 2019-06-19 ASSESSMENT — PAIN SCALES - GENERAL
PAINLEVEL_OUTOF10: 0
PAINLEVEL_OUTOF10: 5
PAINLEVEL_OUTOF10: 0

## 2019-06-19 NOTE — PLAN OF CARE
Problem: Restraint Use - Nonviolent/Non-Self-Destructive Behavior:  Goal: Absence of restraint indications  Description  Absence of restraint indications   6/18/2019 2232 by Jina Malcolm RN  Outcome: Not Met This Shift  6/18/2019 1638 by Tavo Plama RN  Outcome: Not Met This Shift  6/18/2019 1011 by Tavo Palma RN  Outcome: Not Met This Shift     Problem: Restraint Use - Nonviolent/Non-Self-Destructive Behavior:  Goal: Absence of restraint-related injury  Description  Absence of restraint-related injury   6/18/2019 2232 by Jina Malcolm RN  Outcome: Met This Shift  6/18/2019 1638 by Tavo Palma RN  Outcome: Met This Shift  6/18/2019 1011 by Tavo Palma RN  Outcome: Met This Shift

## 2019-06-19 NOTE — FLOWSHEET NOTE
Patient drowsy and becomes restless at times reaches for ETT, pulls at lines and medical equipment.  Patient is unable to be redirected at this time restraints remain in place for patient safety
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
## 2019-06-19 NOTE — PROGRESS NOTES
Pharmacy Consultation Note  (Antibiotic Dosing and Monitoring)    Initial consult date: 6/17/19  Consulting physician: Dr Franko Forde  Drug(s): Vancomycin  Indication: MSSA in sputum; coverage broadened for fevers (>102F) and hypotension    Vancomycin was discontinued today. Therefore, will sign off at this time. Please re-consult if needed.     Sharlene Emanuel PharmD, BCPS, BCCCP  6/19/2019  1:06 PM  Pager: [phone]
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
## 2019-06-19 NOTE — PLAN OF CARE
Problem: Restraint Use - Nonviolent/Non-Self-Destructive Behavior:  Goal: Absence of restraint-related injury  Description  Absence of restraint-related injury   6/19/2019 1035 by Nithin Reyes RN  Outcome: Met This Shift  6/19/2019 0417 by Gloria Fine RN  Outcome: Met This Shift  6/18/2019 2232 by Gloria Fine RN  Outcome: Met This Shift     Problem: Restraint Use - Nonviolent/Non-Self-Destructive Behavior:  Goal: Absence of restraint indications  Description  Absence of restraint indications   6/19/2019 1035 by Nithin Reyes RN  Outcome: Not Met This Shift  6/19/2019 0417 by Gloria Fine RN  Outcome: Not Met This Shift  6/18/2019 2232 by Gloria Fine RN  Outcome: Not Met This Shift
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
## 2019-06-19 NOTE — PLAN OF CARE
Problem: Restraint Use - Nonviolent/Non-Self-Destructive Behavior:  Goal: Absence of restraint-related injury  Description  Absence of restraint-related injury   6/19/2019 0417 by Elysia Celaya RN  Outcome: Met This Shift  6/18/2019 2232 by Elysia Celaya RN  Outcome: Met This Shift  6/18/2019 1638 by Aiyana Muhammad RN  Outcome: Met This Shift     Problem: Restraint Use - Nonviolent/Non-Self-Destructive Behavior:  Goal: Absence of restraint indications  Description  Absence of restraint indications   6/19/2019 0417 by Elysia Celaya RN  Outcome: Not Met This Shift  6/18/2019 2232 by Elysia Celaya RN  Outcome: Not Met This Shift  6/18/2019 1638 by Aiyana Muhammad RN  Outcome: Not Met This Shift

## 2019-06-20 ENCOUNTER — APPOINTMENT (OUTPATIENT)
Dept: GENERAL RADIOLOGY | Age: 24
DRG: 003 | End: 2019-06-20
Payer: OTHER MISCELLANEOUS

## 2019-06-20 LAB
AADO2: 137.7 MMHG
ALBUMIN SERPL-MCNC: 2.6 G/DL (ref 3.5–5.2)
ALP BLD-CCNC: 184 U/L (ref 40–129)
ALT SERPL-CCNC: 223 U/L (ref 0–40)
ANION GAP SERPL CALCULATED.3IONS-SCNC: 11 MMOL/L (ref 7–16)
ANISOCYTOSIS: ABNORMAL
AST SERPL-CCNC: 72 U/L (ref 0–39)
B.E.: 2.6 MMOL/L (ref -3–3)
BASOPHILS ABSOLUTE: 0 E9/L (ref 0–0.2)
BASOPHILS RELATIVE PERCENT: 0.3 % (ref 0–2)
BILIRUB SERPL-MCNC: 2.3 MG/DL (ref 0–1.2)
BUN BLDV-MCNC: 15 MG/DL (ref 6–20)
CALCIUM IONIZED: 1.15 MMOL/L (ref 1.15–1.33)
CALCIUM SERPL-MCNC: 8.2 MG/DL (ref 8.6–10.2)
CHLORIDE BLD-SCNC: 98 MMOL/L (ref 98–107)
CO2: 27 MMOL/L (ref 22–29)
COHB: 1.1 % (ref 0–1.5)
CREAT SERPL-MCNC: 0.5 MG/DL (ref 0.7–1.2)
CRITICAL: ABNORMAL
DATE ANALYZED: ABNORMAL
DATE OF COLLECTION: ABNORMAL
EOSINOPHILS ABSOLUTE: 0.15 E9/L (ref 0.05–0.5)
EOSINOPHILS RELATIVE PERCENT: 0.9 % (ref 0–6)
FIO2: 40 %
GFR AFRICAN AMERICAN: >60
GFR NON-AFRICAN AMERICAN: >60 ML/MIN/1.73
GLUCOSE BLD-MCNC: 132 MG/DL (ref 74–99)
HCO3: 27 MMOL/L (ref 22–26)
HCT VFR BLD CALC: 23.9 % (ref 37–54)
HEMOGLOBIN: 7.6 G/DL (ref 12.5–16.5)
HHB: 2.9 % (ref 0–5)
HYPOCHROMIA: ABNORMAL
LAB: ABNORMAL
LYMPHOCYTES ABSOLUTE: 0.83 E9/L (ref 1.5–4)
LYMPHOCYTES RELATIVE PERCENT: 5.3 % (ref 20–42)
Lab: ABNORMAL
MAGNESIUM: 2.4 MG/DL (ref 1.6–2.6)
MCH RBC QN AUTO: 28 PG (ref 26–35)
MCHC RBC AUTO-ENTMCNC: 31.8 % (ref 32–34.5)
MCV RBC AUTO: 88.2 FL (ref 80–99.9)
METAMYELOCYTES RELATIVE PERCENT: 2.7 % (ref 0–1)
METHB: 0.6 % (ref 0–1.5)
MODE: ABNORMAL
MONOCYTES ABSOLUTE: 0.66 E9/L (ref 0.1–0.95)
MONOCYTES RELATIVE PERCENT: 3.5 % (ref 2–12)
NEUTROPHILS ABSOLUTE: 14.85 E9/L (ref 1.8–7.3)
NEUTROPHILS RELATIVE PERCENT: 87.6 % (ref 43–80)
O2 CONTENT: 12 ML/DL
O2 SATURATION: 97 % (ref 92–98.5)
O2HB: 95.4 % (ref 94–97)
OPERATOR ID: 2464
OVALOCYTES: ABNORMAL
PATIENT TEMP: 37 C
PCO2: 40.9 MMHG (ref 35–45)
PDW BLD-RTO: 15.9 FL (ref 11.5–15)
PEEP/CPAP: 10 CMH2O
PFO2: 2.26 MMHG/%
PH BLOOD GAS: 7.44 (ref 7.35–7.45)
PHOSPHORUS: 3.2 MG/DL (ref 2.5–4.5)
PLATELET # BLD: 344 E9/L (ref 130–450)
PMV BLD AUTO: 9.5 FL (ref 7–12)
PO2: 90.5 MMHG (ref 60–100)
POIKILOCYTES: ABNORMAL
POLYCHROMASIA: ABNORMAL
POTASSIUM SERPL-SCNC: 3.9 MMOL/L (ref 3.5–5)
PS: 16 CMH20
RBC # BLD: 2.71 E12/L (ref 3.8–5.8)
RI(T): 152 %
SODIUM BLD-SCNC: 136 MMOL/L (ref 132–146)
SOURCE, BLOOD GAS: ABNORMAL
THB: 8.8 G/DL (ref 11.5–16.5)
TIME ANALYZED: 502
TOTAL PROTEIN: 6.2 G/DL (ref 6.4–8.3)
WBC # BLD: 16.5 E9/L (ref 4.5–11.5)

## 2019-06-20 PROCEDURE — 71045 X-RAY EXAM CHEST 1 VIEW: CPT

## 2019-06-20 PROCEDURE — 36415 COLL VENOUS BLD VENIPUNCTURE: CPT

## 2019-06-20 PROCEDURE — 6360000002 HC RX W HCPCS

## 2019-06-20 PROCEDURE — 6370000000 HC RX 637 (ALT 250 FOR IP): Performed by: STUDENT IN AN ORGANIZED HEALTH CARE EDUCATION/TRAINING PROGRAM

## 2019-06-20 PROCEDURE — 2580000003 HC RX 258: Performed by: STUDENT IN AN ORGANIZED HEALTH CARE EDUCATION/TRAINING PROGRAM

## 2019-06-20 PROCEDURE — 6360000002 HC RX W HCPCS: Performed by: STUDENT IN AN ORGANIZED HEALTH CARE EDUCATION/TRAINING PROGRAM

## 2019-06-20 PROCEDURE — 94640 AIRWAY INHALATION TREATMENT: CPT

## 2019-06-20 PROCEDURE — 6360000002 HC RX W HCPCS: Performed by: SURGERY

## 2019-06-20 PROCEDURE — 36592 COLLECT BLOOD FROM PICC: CPT

## 2019-06-20 PROCEDURE — 85025 COMPLETE CBC W/AUTO DIFF WBC: CPT

## 2019-06-20 PROCEDURE — 2580000003 HC RX 258: Performed by: SURGERY

## 2019-06-20 PROCEDURE — 82330 ASSAY OF CALCIUM: CPT

## 2019-06-20 PROCEDURE — 84100 ASSAY OF PHOSPHORUS: CPT

## 2019-06-20 PROCEDURE — 99291 CRITICAL CARE FIRST HOUR: CPT | Performed by: SURGERY

## 2019-06-20 PROCEDURE — 2000000000 HC ICU R&B

## 2019-06-20 PROCEDURE — 94003 VENT MGMT INPAT SUBQ DAY: CPT

## 2019-06-20 PROCEDURE — 97530 THERAPEUTIC ACTIVITIES: CPT

## 2019-06-20 PROCEDURE — 6370000000 HC RX 637 (ALT 250 FOR IP): Performed by: SURGERY

## 2019-06-20 PROCEDURE — 97110 THERAPEUTIC EXERCISES: CPT

## 2019-06-20 PROCEDURE — 83735 ASSAY OF MAGNESIUM: CPT

## 2019-06-20 PROCEDURE — 82805 BLOOD GASES W/O2 SATURATION: CPT

## 2019-06-20 PROCEDURE — 80053 COMPREHEN METABOLIC PANEL: CPT

## 2019-06-20 RX ORDER — CHOLECALCIFEROL (VITAMIN D3) 125 MCG
10 CAPSULE ORAL NIGHTLY PRN
Status: DISCONTINUED | OUTPATIENT
Start: 2019-06-20 | End: 2019-06-23

## 2019-06-20 RX ORDER — LORAZEPAM 2 MG/ML
INJECTION INTRAMUSCULAR
Status: COMPLETED
Start: 2019-06-20 | End: 2019-06-20

## 2019-06-20 RX ORDER — FLUCONAZOLE 40 MG/ML
200 POWDER, FOR SUSPENSION ORAL DAILY
Status: DISPENSED | OUTPATIENT
Start: 2019-06-20 | End: 2019-06-27

## 2019-06-20 RX ORDER — OXYCODONE HCL 20 MG/ML
20 CONCENTRATE, ORAL ORAL EVERY 6 HOURS
Status: DISCONTINUED | OUTPATIENT
Start: 2019-06-20 | End: 2019-06-26

## 2019-06-20 RX ORDER — LACTULOSE 10 G/15ML
20 SOLUTION ORAL EVERY 6 HOURS SCHEDULED
Status: DISCONTINUED | OUTPATIENT
Start: 2019-06-20 | End: 2019-06-28 | Stop reason: HOSPADM

## 2019-06-20 RX ORDER — LORAZEPAM 2 MG/ML
2 INJECTION INTRAMUSCULAR EVERY 6 HOURS PRN
Status: DISCONTINUED | OUTPATIENT
Start: 2019-06-20 | End: 2019-06-24

## 2019-06-20 RX ORDER — OXYCODONE HCL 5 MG/5 ML
10 SOLUTION, ORAL ORAL ONCE
Status: COMPLETED | OUTPATIENT
Start: 2019-06-20 | End: 2019-06-20

## 2019-06-20 RX ADMIN — PANTOPRAZOLE SODIUM 40 MG: 40 GRANULE, DELAYED RELEASE ORAL at 06:16

## 2019-06-20 RX ADMIN — FUROSEMIDE 3 MG/HR: 10 INJECTION, SOLUTION INTRAMUSCULAR; INTRAVENOUS at 10:53

## 2019-06-20 RX ADMIN — CEFAZOLIN 3 G: 10 INJECTION, POWDER, FOR SOLUTION INTRAVENOUS; PARENTERAL at 11:03

## 2019-06-20 RX ADMIN — CHLORHEXIDINE GLUCONATE 0.12% ORAL RINSE 15 ML: 1.2 LIQUID ORAL at 08:09

## 2019-06-20 RX ADMIN — LACTULOSE 20 G: 20 SOLUTION ORAL at 17:15

## 2019-06-20 RX ADMIN — BACITRACIN ZINC: 500 OINTMENT TOPICAL at 08:10

## 2019-06-20 RX ADMIN — CHLORHEXIDINE GLUCONATE 0.12% ORAL RINSE 15 ML: 1.2 LIQUID ORAL at 21:43

## 2019-06-20 RX ADMIN — METHOCARBAMOL TABLETS 1000 MG: 500 TABLET, COATED ORAL at 15:34

## 2019-06-20 RX ADMIN — METHOCARBAMOL TABLETS 1000 MG: 500 TABLET, COATED ORAL at 11:00

## 2019-06-20 RX ADMIN — IPRATROPIUM BROMIDE AND ALBUTEROL SULFATE 1 AMPULE: .5; 3 SOLUTION RESPIRATORY (INHALATION) at 07:42

## 2019-06-20 RX ADMIN — LACTULOSE 20 G: 20 SOLUTION ORAL at 23:48

## 2019-06-20 RX ADMIN — OXYCODONE HYDROCHLORIDE 20 MG: 100 SOLUTION ORAL at 15:32

## 2019-06-20 RX ADMIN — LORAZEPAM 2 MG: 2 INJECTION INTRAMUSCULAR; INTRAVENOUS at 18:54

## 2019-06-20 RX ADMIN — SENNOSIDES 10 ML: 8.8 SYRUP ORAL at 21:49

## 2019-06-20 RX ADMIN — IPRATROPIUM BROMIDE AND ALBUTEROL SULFATE 1 AMPULE: .5; 3 SOLUTION RESPIRATORY (INHALATION) at 19:52

## 2019-06-20 RX ADMIN — LACTULOSE 20 G: 20 SOLUTION ORAL at 08:09

## 2019-06-20 RX ADMIN — ENOXAPARIN SODIUM 40 MG: 40 INJECTION, SOLUTION INTRAVENOUS; SUBCUTANEOUS at 08:09

## 2019-06-20 RX ADMIN — HYDROMORPHONE HYDROCHLORIDE 1 MG: 1 INJECTION, SOLUTION INTRAMUSCULAR; INTRAVENOUS; SUBCUTANEOUS at 13:30

## 2019-06-20 RX ADMIN — IPRATROPIUM BROMIDE AND ALBUTEROL SULFATE 1 AMPULE: .5; 3 SOLUTION RESPIRATORY (INHALATION) at 12:04

## 2019-06-20 RX ADMIN — ENOXAPARIN SODIUM 40 MG: 40 INJECTION, SOLUTION INTRAVENOUS; SUBCUTANEOUS at 21:43

## 2019-06-20 RX ADMIN — BACITRACIN ZINC: 500 OINTMENT TOPICAL at 15:33

## 2019-06-20 RX ADMIN — LACTULOSE 20 G: 20 SOLUTION ORAL at 12:07

## 2019-06-20 RX ADMIN — DOCUSATE SODIUM 100 MG: 50 LIQUID ORAL at 08:09

## 2019-06-20 RX ADMIN — CEFAZOLIN 3 G: 10 INJECTION, POWDER, FOR SOLUTION INTRAVENOUS; PARENTERAL at 17:15

## 2019-06-20 RX ADMIN — Medication 10 ML: at 21:48

## 2019-06-20 RX ADMIN — MAGNESIUM HYDROXIDE 30 ML: 400 SUSPENSION ORAL at 08:09

## 2019-06-20 RX ADMIN — METHOCARBAMOL TABLETS 1000 MG: 500 TABLET, COATED ORAL at 03:49

## 2019-06-20 RX ADMIN — CLONIDINE HYDROCHLORIDE 0.1 MG: 0.1 TABLET ORAL at 21:47

## 2019-06-20 RX ADMIN — HYDROMORPHONE HYDROCHLORIDE 1 MG: 1 INJECTION, SOLUTION INTRAMUSCULAR; INTRAVENOUS; SUBCUTANEOUS at 20:19

## 2019-06-20 RX ADMIN — HYDROMORPHONE HYDROCHLORIDE 1 MG: 1 INJECTION, SOLUTION INTRAMUSCULAR; INTRAVENOUS; SUBCUTANEOUS at 17:11

## 2019-06-20 RX ADMIN — OXYCODONE HYDROCHLORIDE 10 MG: 5 SOLUTION ORAL at 03:49

## 2019-06-20 RX ADMIN — OXYCODONE HYDROCHLORIDE 20 MG: 100 SOLUTION ORAL at 21:30

## 2019-06-20 RX ADMIN — CLONIDINE HYDROCHLORIDE 0.1 MG: 0.1 TABLET ORAL at 15:32

## 2019-06-20 RX ADMIN — Medication 6 MG: at 01:16

## 2019-06-20 RX ADMIN — CEFAZOLIN 3 G: 10 INJECTION, POWDER, FOR SOLUTION INTRAVENOUS; PARENTERAL at 02:01

## 2019-06-20 RX ADMIN — BACITRACIN ZINC: 500 OINTMENT TOPICAL at 20:19

## 2019-06-20 RX ADMIN — ACETAMINOPHEN ORAL SOLUTION 650 MG: 650 SOLUTION ORAL at 21:43

## 2019-06-20 RX ADMIN — OXYCODONE HYDROCHLORIDE 10 MG: 5 SOLUTION ORAL at 08:09

## 2019-06-20 RX ADMIN — Medication 10 ML: at 08:10

## 2019-06-20 RX ADMIN — DOCUSATE SODIUM 100 MG: 50 LIQUID ORAL at 21:45

## 2019-06-20 RX ADMIN — METHOCARBAMOL TABLETS 1000 MG: 500 TABLET, COATED ORAL at 21:47

## 2019-06-20 RX ADMIN — OXYCODONE HYDROCHLORIDE 10 MG: 5 SOLUTION ORAL at 10:53

## 2019-06-20 RX ADMIN — Medication: at 06:50

## 2019-06-20 RX ADMIN — CLONIDINE HYDROCHLORIDE 0.1 MG: 0.1 TABLET ORAL at 06:16

## 2019-06-20 RX ADMIN — IPRATROPIUM BROMIDE AND ALBUTEROL SULFATE 1 AMPULE: .5; 3 SOLUTION RESPIRATORY (INHALATION) at 15:47

## 2019-06-20 RX ADMIN — ACETAMINOPHEN ORAL SOLUTION 650 MG: 650 SOLUTION ORAL at 12:07

## 2019-06-20 RX ADMIN — Medication 10 MG: at 21:47

## 2019-06-20 RX ADMIN — FLUCONAZOLE 200 MG: 40 POWDER, FOR SUSPENSION ORAL at 12:08

## 2019-06-20 ASSESSMENT — PULMONARY FUNCTION TESTS
PIF_VALUE: 27
PIF_VALUE: 28
PIF_VALUE: 27
PIF_VALUE: 26
PIF_VALUE: 27
PIF_VALUE: 28
PIF_VALUE: 27
PIF_VALUE: 27
PIF_VALUE: 26
PIF_VALUE: 27
PIF_VALUE: 26

## 2019-06-20 ASSESSMENT — PAIN SCALES - GENERAL
PAINLEVEL_OUTOF10: 2
PAINLEVEL_OUTOF10: 0
PAINLEVEL_OUTOF10: 6
PAINLEVEL_OUTOF10: 0
PAINLEVEL_OUTOF10: 0
PAINLEVEL_OUTOF10: 3

## 2019-06-20 ASSESSMENT — PAIN DESCRIPTION - DESCRIPTORS
DESCRIPTORS: ACHING;DISCOMFORT
DESCRIPTORS: BURNING

## 2019-06-20 ASSESSMENT — PAIN DESCRIPTION - LOCATION
LOCATION: MOUTH
LOCATION: GENERALIZED

## 2019-06-20 ASSESSMENT — PAIN DESCRIPTION - PAIN TYPE
TYPE: ACUTE PAIN
TYPE: ACUTE PAIN

## 2019-06-20 ASSESSMENT — PAIN DESCRIPTION - FREQUENCY: FREQUENCY: CONTINUOUS

## 2019-06-20 ASSESSMENT — PAIN DESCRIPTION - ONSET: ONSET: ON-GOING
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
## 2019-06-20 NOTE — PLAN OF CARE
Problem: Falls - Risk of:  Goal: Will remain free from falls  Description  Will remain free from falls  Outcome: Met This Shift     Problem: Risk for Impaired Skin Integrity  Goal: Tissue integrity - skin and mucous membranes  Description  Structural intactness and normal physiological function of skin and  mucous membranes.   Outcome: Met This Shift     Problem: Restraint Use - Nonviolent/Non-Self-Destructive Behavior:  Goal: Absence of restraint-related injury  Description  Absence of restraint-related injury   6/20/2019 0449 by Evert Adame RN  Outcome: Met This Shift  6/19/2019 2205 by Evert Adame RN  Outcome: Met This Shift

## 2019-06-20 NOTE — PLAN OF CARE
Problem: Restraint Use - Nonviolent/Non-Self-Destructive Behavior:  Goal: Absence of restraint-related injury  Description  Absence of restraint-related injury   6/19/2019 2205 by Yrn Shelton RN  Outcome: Met This Shift  6/19/2019 1035 by Srinivas Campos RN  Outcome: Met This Shift     Problem: Restraint Use - Nonviolent/Non-Self-Destructive Behavior:  Goal: Absence of restraint indications  Description  Absence of restraint indications   6/19/2019 2205 by Yrn Shelton RN  Outcome: Not Met This Shift  6/19/2019 1035 by Srinivas Campos RN  Outcome: Not Met This Shift
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
## 2019-06-20 NOTE — PROGRESS NOTES
for majority of session but does open if cued to do so. Does respond consistently with gestures and yes/no head nods, thumbs up/down; writing with F+ legibility               Memory: F+              Sequencing: F              Problem solving: P+              Judgement/safety: P+  2PR for safety     RASS: 0 to -1  CAM-ICU: Negative     Functional Assessment:    Initial Eval Status  Date: 6/17/19 Treatment Status  Date: 6/20/19 Short Term Goals  Treatment frequency: 1-3x/week on ICU; PRN on stepdown unit  -pt will improve. ..    Feeding NPO  NPO     Indep  Sitting upright in chair for majority of meals; pending cleared diet restriction   Grooming Mod A overall     SBA to wash face/wipe eyes at bed level     Max A oral suction use d/t secretions     Mod A overall     SBA to wash face/wipe eyes at bed level  Per last tx session     Max A oral suction use d/t excessive secretion     Supervision  while seated EOB; G BUE functional use; no LOB      UB Dressing Max A     Max A  Donning gown at bed level semi-reclined position, able to lift each arm off bedside but mod A to manage gown fully up to each shoulder  Per last tx session; declined this date     Supervision  while seated EOB; G BUE functional use; no LOB   LB Dressing DEP  Donning socks only  DEP    Verbal intro to AE use    Min A   with AE/device PRN   Bathing UB-  Mod A  LB-  Max A NT     UB-  Sup  LB-  Mod A with AE/DME prn   Toileting DEP  Davis; bed level  DEP     Min A  including clothing mgmt and toileting hygiene using DME/device prn   Bed Mobility  Supine to sit: Max A x2 with elevated HOB  Sit to supine: Max A x2  Rolling: NT Supine to sit: NT  Sit to supine:NT  Rolling: NT   Repositioning: Max A x2  Min A   in prep for EOB ADL tasks   Functional/  Bathroom  Transfers Sit to stand: NT  Stand to sit: NT  Stand pivot: NT Sit to stand: NT  Stand to sit: NT  Stand pivot: NT   Mod A  from varying surfaces using device/DME prn;      good adherence to NWB LLE Functional Mobility NT  NT  Mod A   Bed <> bathroom distance using device (per PT recommendation) prn     good adherence to NWB LLE   Balance Sitting:     Static:  Mod A    Dynamic:Max A  Standing:     Static:  NT    Dynamic:NT  Sitting:     Static: NT    Dynamic:NT  Standing:     Static:  NT    Dynamic:NT demo Supervision dynamic sitting balance EOB during ADL tasks; Mod A dynamic standing balance during ADL tasks using device prn   Endurance/  Activity Tolerance P+ activity tolerance/endurance during light ADL task ; sitting tolerance EOB ~5 minutes   F activity tolerance/endurance during light BUE AROM exercises at bed level with HOB elevated     Continued cues to take slow controlled deep breathing d/t increased RR. Showing good tidal volumes.    RR ranged in 35s breathes/min demo G activity tolerance/endurance during [id number] min ADL task      G demo of EC, pacing and proper breathing techniques   Visual/  Perceptual Glasses: none     Pt with F+ visual attn; F smooth visual scanning; jumpiness noted L eye.      L eye strabismus exotropia (L lateral/outward turning eye)     Able to duplicate number of fingers in front     F+ accuracy with line bisection test with mod VC to scan L side of paper to locate          Safety P       F     Pt approached with 2PR however did not pull at any lines during session  G    BUE strength/endurance See below  See below  Good tolerance to BUE AAROM/AROM exercise to improve overall use during ADLs and functional mobility       Hand dominance: right       Strength ROM  Comments   RUE  Proximal: NT d/t sternoclavicular joint distraction  Distal: 4+/5 Proximal:  -PROM: WFL   -AROM: WFL  Distal: WFL G   F FMC/dexterity noted during ADL tasks   LUE Proximal: 4-/5  Distal: 4+/5 Proximal:  -PROM: WFL   -AROM: WFL  Distal: WFL G   F FMC/dexterity noted during ADL tasks       Comments: Ok from RN to see pt. Larnell Homans arrival pt supine in bed and agreeable to session with PT Spo2 at end of session: --   Vent settings: P/S FiO2 40%, PEEP 10     · Pt has made fair+ progress towards set goals.    · Continue with current plan of care     Time in: 13:25  Time out: 14:10  Total Tx Time: 45 minutes  Albaro Marquez, OTR/L [id number]

## 2019-06-21 ENCOUNTER — APPOINTMENT (OUTPATIENT)
Dept: GENERAL RADIOLOGY | Age: 24
DRG: 003 | End: 2019-06-21
Payer: OTHER MISCELLANEOUS

## 2019-06-21 LAB
AADO2: 140.6 MMHG
ALBUMIN SERPL-MCNC: 2.5 G/DL (ref 3.5–5.2)
ALP BLD-CCNC: 183 U/L (ref 40–129)
ALT SERPL-CCNC: 149 U/L (ref 0–40)
ANION GAP SERPL CALCULATED.3IONS-SCNC: 11 MMOL/L (ref 7–16)
ANISOCYTOSIS: ABNORMAL
AST SERPL-CCNC: 68 U/L (ref 0–39)
B.E.: 4.8 MMOL/L (ref -3–3)
BASOPHILS ABSOLUTE: 0 E9/L (ref 0–0.2)
BASOPHILS RELATIVE PERCENT: 0.3 % (ref 0–2)
BILIRUB SERPL-MCNC: 1.8 MG/DL (ref 0–1.2)
BUN BLDV-MCNC: 14 MG/DL (ref 6–20)
CALCIUM IONIZED: 1.16 MMOL/L (ref 1.15–1.33)
CALCIUM SERPL-MCNC: 7.9 MG/DL (ref 8.6–10.2)
CHLORIDE BLD-SCNC: 99 MMOL/L (ref 98–107)
CO2: 29 MMOL/L (ref 22–29)
COHB: 1.5 % (ref 0–1.5)
CREAT SERPL-MCNC: 0.6 MG/DL (ref 0.7–1.2)
CRITICAL: ABNORMAL
DATE ANALYZED: ABNORMAL
DATE OF COLLECTION: ABNORMAL
EOSINOPHILS ABSOLUTE: 0.11 E9/L (ref 0.05–0.5)
EOSINOPHILS RELATIVE PERCENT: 0.9 % (ref 0–6)
FIO2: 40 %
GFR AFRICAN AMERICAN: >60
GFR NON-AFRICAN AMERICAN: >60 ML/MIN/1.73
GLUCOSE BLD-MCNC: 134 MG/DL (ref 74–99)
HCO3: 28.9 MMOL/L (ref 22–26)
HCT VFR BLD CALC: 22.8 % (ref 37–54)
HEMOGLOBIN: 7.1 G/DL (ref 12.5–16.5)
HHB: 3.4 % (ref 0–5)
HYPOCHROMIA: ABNORMAL
LAB: ABNORMAL
LYMPHOCYTES ABSOLUTE: 1.27 E9/L (ref 1.5–4)
LYMPHOCYTES RELATIVE PERCENT: 9.6 % (ref 20–42)
Lab: ABNORMAL
MAGNESIUM: 2.3 MG/DL (ref 1.6–2.6)
MCH RBC QN AUTO: 27.5 PG (ref 26–35)
MCHC RBC AUTO-ENTMCNC: 31.1 % (ref 32–34.5)
MCV RBC AUTO: 88.4 FL (ref 80–99.9)
METAMYELOCYTES RELATIVE PERCENT: 2.6 % (ref 0–1)
METHB: 0.7 % (ref 0–1.5)
MODE: ABNORMAL
MONOCYTES ABSOLUTE: 1.14 E9/L (ref 0.1–0.95)
MONOCYTES RELATIVE PERCENT: 8.7 % (ref 2–12)
NEUTROPHILS ABSOLUTE: 10.29 E9/L (ref 1.8–7.3)
NEUTROPHILS RELATIVE PERCENT: 78.3 % (ref 43–80)
O2 SATURATION: 96.5 % (ref 92–98.5)
O2HB: 94.4 % (ref 94–97)
OPERATOR ID: ABNORMAL
OVALOCYTES: ABNORMAL
PATIENT TEMP: 37 C
PCO2: 40.7 MMHG (ref 35–45)
PDW BLD-RTO: 15.9 FL (ref 11.5–15)
PFO2: 2.2 MMHG/%
PH BLOOD GAS: 7.47 (ref 7.35–7.45)
PHOSPHORUS: 3.2 MG/DL (ref 2.5–4.5)
PLATELET # BLD: 371 E9/L (ref 130–450)
PMV BLD AUTO: 9.1 FL (ref 7–12)
PO2: 87.8 MMHG (ref 60–100)
POIKILOCYTES: ABNORMAL
POLYCHROMASIA: ABNORMAL
POTASSIUM SERPL-SCNC: 4.1 MMOL/L (ref 3.5–5)
RBC # BLD: 2.58 E12/L (ref 3.8–5.8)
RI(T): 1.6
SODIUM BLD-SCNC: 139 MMOL/L (ref 132–146)
SOURCE, BLOOD GAS: ABNORMAL
THB: 8.2 G/DL (ref 11.5–16.5)
TIME ANALYZED: 546
TOTAL PROTEIN: 6.1 G/DL (ref 6.4–8.3)
WBC # BLD: 12.7 E9/L (ref 4.5–11.5)

## 2019-06-21 PROCEDURE — 2580000003 HC RX 258: Performed by: SURGERY

## 2019-06-21 PROCEDURE — 82330 ASSAY OF CALCIUM: CPT

## 2019-06-21 PROCEDURE — 71045 X-RAY EXAM CHEST 1 VIEW: CPT

## 2019-06-21 PROCEDURE — 2580000003 HC RX 258: Performed by: STUDENT IN AN ORGANIZED HEALTH CARE EDUCATION/TRAINING PROGRAM

## 2019-06-21 PROCEDURE — 6370000000 HC RX 637 (ALT 250 FOR IP): Performed by: STUDENT IN AN ORGANIZED HEALTH CARE EDUCATION/TRAINING PROGRAM

## 2019-06-21 PROCEDURE — 94640 AIRWAY INHALATION TREATMENT: CPT

## 2019-06-21 PROCEDURE — 6360000002 HC RX W HCPCS: Performed by: STUDENT IN AN ORGANIZED HEALTH CARE EDUCATION/TRAINING PROGRAM

## 2019-06-21 PROCEDURE — 99291 CRITICAL CARE FIRST HOUR: CPT | Performed by: SURGERY

## 2019-06-21 PROCEDURE — 6370000000 HC RX 637 (ALT 250 FOR IP): Performed by: SURGERY

## 2019-06-21 PROCEDURE — 85025 COMPLETE CBC W/AUTO DIFF WBC: CPT

## 2019-06-21 PROCEDURE — 6360000002 HC RX W HCPCS: Performed by: SURGERY

## 2019-06-21 PROCEDURE — 83735 ASSAY OF MAGNESIUM: CPT

## 2019-06-21 PROCEDURE — 97110 THERAPEUTIC EXERCISES: CPT

## 2019-06-21 PROCEDURE — 84100 ASSAY OF PHOSPHORUS: CPT

## 2019-06-21 PROCEDURE — 80053 COMPREHEN METABOLIC PANEL: CPT

## 2019-06-21 PROCEDURE — 94003 VENT MGMT INPAT SUBQ DAY: CPT

## 2019-06-21 PROCEDURE — 82805 BLOOD GASES W/O2 SATURATION: CPT

## 2019-06-21 PROCEDURE — 36415 COLL VENOUS BLD VENIPUNCTURE: CPT

## 2019-06-21 PROCEDURE — 36592 COLLECT BLOOD FROM PICC: CPT | Performed by: NURSE PRACTITIONER

## 2019-06-21 PROCEDURE — 2000000000 HC ICU R&B

## 2019-06-21 RX ORDER — LIDOCAINE HYDROCHLORIDE 10 MG/ML
5 INJECTION, SOLUTION EPIDURAL; INFILTRATION; INTRACAUDAL; PERINEURAL ONCE
Status: DISCONTINUED | OUTPATIENT
Start: 2019-06-21 | End: 2019-06-24

## 2019-06-21 RX ORDER — HEPARIN SODIUM (PORCINE) LOCK FLUSH IV SOLN 100 UNIT/ML 100 UNIT/ML
3 SOLUTION INTRAVENOUS PRN
Status: DISCONTINUED | OUTPATIENT
Start: 2019-06-21 | End: 2019-06-28 | Stop reason: HOSPADM

## 2019-06-21 RX ORDER — SODIUM CHLORIDE 0.9 % (FLUSH) 0.9 %
10 SYRINGE (ML) INJECTION PRN
Status: DISCONTINUED | OUTPATIENT
Start: 2019-06-21 | End: 2019-06-28 | Stop reason: HOSPADM

## 2019-06-21 RX ORDER — HEPARIN SODIUM (PORCINE) LOCK FLUSH IV SOLN 100 UNIT/ML 100 UNIT/ML
3 SOLUTION INTRAVENOUS EVERY 12 HOURS SCHEDULED
Status: DISCONTINUED | OUTPATIENT
Start: 2019-06-21 | End: 2019-06-28 | Stop reason: HOSPADM

## 2019-06-21 RX ORDER — LORAZEPAM 2 MG/ML
1 INJECTION INTRAMUSCULAR EVERY 6 HOURS PRN
Status: CANCELLED | OUTPATIENT
Start: 2019-06-21

## 2019-06-21 RX ORDER — CLONIDINE HYDROCHLORIDE 0.2 MG/1
0.2 TABLET ORAL EVERY 8 HOURS SCHEDULED
Status: CANCELLED | OUTPATIENT
Start: 2019-06-21

## 2019-06-21 RX ORDER — BISACODYL 10 MG
10 SUPPOSITORY, RECTAL RECTAL DAILY
Status: DISCONTINUED | OUTPATIENT
Start: 2019-06-21 | End: 2019-06-28 | Stop reason: HOSPADM

## 2019-06-21 RX ORDER — SODIUM CHLORIDE 9 MG/ML
1000 INJECTION, SOLUTION INTRAVENOUS ONCE
Status: DISCONTINUED | OUTPATIENT
Start: 2019-06-21 | End: 2019-06-21

## 2019-06-21 RX ORDER — ACETAMINOPHEN 650 MG/1
650 SUPPOSITORY RECTAL ONCE
Status: COMPLETED | OUTPATIENT
Start: 2019-06-21 | End: 2019-06-21

## 2019-06-21 RX ADMIN — ENOXAPARIN SODIUM 40 MG: 40 INJECTION, SOLUTION INTRAVENOUS; SUBCUTANEOUS at 20:15

## 2019-06-21 RX ADMIN — OXYCODONE HYDROCHLORIDE 20 MG: 100 SOLUTION ORAL at 09:24

## 2019-06-21 RX ADMIN — OXYCODONE HYDROCHLORIDE 20 MG: 100 SOLUTION ORAL at 14:31

## 2019-06-21 RX ADMIN — FLUCONAZOLE 200 MG: 40 POWDER, FOR SUSPENSION ORAL at 09:24

## 2019-06-21 RX ADMIN — METHOCARBAMOL TABLETS 1000 MG: 500 TABLET, COATED ORAL at 03:23

## 2019-06-21 RX ADMIN — CHLORHEXIDINE GLUCONATE 0.12% ORAL RINSE 15 ML: 1.2 LIQUID ORAL at 09:44

## 2019-06-21 RX ADMIN — Medication 10 ML: at 20:12

## 2019-06-21 RX ADMIN — METHOCARBAMOL TABLETS 1000 MG: 500 TABLET, COATED ORAL at 10:44

## 2019-06-21 RX ADMIN — DOCUSATE SODIUM 100 MG: 50 LIQUID ORAL at 10:44

## 2019-06-21 RX ADMIN — FUROSEMIDE 5 MG/HR: 10 INJECTION, SOLUTION INTRAMUSCULAR; INTRAVENOUS at 07:00

## 2019-06-21 RX ADMIN — CLONIDINE HYDROCHLORIDE 0.1 MG: 0.1 TABLET ORAL at 05:24

## 2019-06-21 RX ADMIN — IPRATROPIUM BROMIDE AND ALBUTEROL SULFATE 1 AMPULE: .5; 3 SOLUTION RESPIRATORY (INHALATION) at 11:15

## 2019-06-21 RX ADMIN — Medication 10 ML: at 09:24

## 2019-06-21 RX ADMIN — METHOCARBAMOL TABLETS 1000 MG: 500 TABLET, COATED ORAL at 22:06

## 2019-06-21 RX ADMIN — OXYCODONE HYDROCHLORIDE 20 MG: 100 SOLUTION ORAL at 03:23

## 2019-06-21 RX ADMIN — LORAZEPAM 2 MG: 2 INJECTION INTRAMUSCULAR; INTRAVENOUS at 05:26

## 2019-06-21 RX ADMIN — IPRATROPIUM BROMIDE AND ALBUTEROL SULFATE 1 AMPULE: .5; 3 SOLUTION RESPIRATORY (INHALATION) at 15:30

## 2019-06-21 RX ADMIN — DOCUSATE SODIUM 100 MG: 50 LIQUID ORAL at 20:15

## 2019-06-21 RX ADMIN — HYDROMORPHONE HYDROCHLORIDE 1 MG: 1 INJECTION, SOLUTION INTRAMUSCULAR; INTRAVENOUS; SUBCUTANEOUS at 01:12

## 2019-06-21 RX ADMIN — ACETAMINOPHEN ORAL SOLUTION 650 MG: 650 SOLUTION ORAL at 16:53

## 2019-06-21 RX ADMIN — PANTOPRAZOLE SODIUM 40 MG: 40 GRANULE, DELAYED RELEASE ORAL at 05:20

## 2019-06-21 RX ADMIN — Medication 10 MG: at 20:12

## 2019-06-21 RX ADMIN — LACTULOSE 20 G: 20 SOLUTION ORAL at 18:46

## 2019-06-21 RX ADMIN — LORAZEPAM 2 MG: 2 INJECTION INTRAMUSCULAR; INTRAVENOUS at 14:52

## 2019-06-21 RX ADMIN — CLONIDINE HYDROCHLORIDE 0.1 MG: 0.1 TABLET ORAL at 22:06

## 2019-06-21 RX ADMIN — ENOXAPARIN SODIUM 40 MG: 40 INJECTION, SOLUTION INTRAVENOUS; SUBCUTANEOUS at 09:24

## 2019-06-21 RX ADMIN — CEFAZOLIN 3 G: 10 INJECTION, POWDER, FOR SOLUTION INTRAVENOUS; PARENTERAL at 10:49

## 2019-06-21 RX ADMIN — METHOCARBAMOL TABLETS 1000 MG: 500 TABLET, COATED ORAL at 16:54

## 2019-06-21 RX ADMIN — IPRATROPIUM BROMIDE AND ALBUTEROL SULFATE 1 AMPULE: .5; 3 SOLUTION RESPIRATORY (INHALATION) at 20:00

## 2019-06-21 RX ADMIN — IPRATROPIUM BROMIDE AND ALBUTEROL SULFATE 1 AMPULE: .5; 3 SOLUTION RESPIRATORY (INHALATION) at 07:35

## 2019-06-21 RX ADMIN — HYDROMORPHONE HYDROCHLORIDE 1 MG: 1 INJECTION, SOLUTION INTRAMUSCULAR; INTRAVENOUS; SUBCUTANEOUS at 09:04

## 2019-06-21 RX ADMIN — BACITRACIN ZINC: 500 OINTMENT TOPICAL at 14:29

## 2019-06-21 RX ADMIN — CHLORHEXIDINE GLUCONATE 0.12% ORAL RINSE 15 ML: 1.2 LIQUID ORAL at 20:12

## 2019-06-21 RX ADMIN — ACETAMINOPHEN 650 MG: 650 SUPPOSITORY RECTAL at 11:28

## 2019-06-21 RX ADMIN — BACITRACIN ZINC: 500 OINTMENT TOPICAL at 20:12

## 2019-06-21 RX ADMIN — LACTULOSE 20 G: 20 SOLUTION ORAL at 05:20

## 2019-06-21 RX ADMIN — CLONIDINE HYDROCHLORIDE 0.1 MG: 0.1 TABLET ORAL at 14:28

## 2019-06-21 RX ADMIN — FUROSEMIDE 10 MG/HR: 10 INJECTION, SOLUTION INTRAMUSCULAR; INTRAVENOUS at 17:44

## 2019-06-21 RX ADMIN — BACITRACIN ZINC: 500 OINTMENT TOPICAL at 09:24

## 2019-06-21 RX ADMIN — ACETAMINOPHEN ORAL SOLUTION 650 MG: 650 SOLUTION ORAL at 10:44

## 2019-06-21 RX ADMIN — SENNOSIDES 10 ML: 8.8 SYRUP ORAL at 20:13

## 2019-06-21 RX ADMIN — CEFAZOLIN 3 G: 10 INJECTION, POWDER, FOR SOLUTION INTRAVENOUS; PARENTERAL at 17:45

## 2019-06-21 RX ADMIN — OXYCODONE HYDROCHLORIDE 20 MG: 100 SOLUTION ORAL at 20:16

## 2019-06-21 RX ADMIN — CEFAZOLIN 3 G: 10 INJECTION, POWDER, FOR SOLUTION INTRAVENOUS; PARENTERAL at 01:22

## 2019-06-21 ASSESSMENT — PAIN DESCRIPTION - FREQUENCY: FREQUENCY: CONTINUOUS

## 2019-06-21 ASSESSMENT — PAIN DESCRIPTION - PAIN TYPE
TYPE: ACUTE PAIN
TYPE: ACUTE PAIN

## 2019-06-21 ASSESSMENT — PULMONARY FUNCTION TESTS
PIF_VALUE: 26
PIF_VALUE: 27
PIF_VALUE: 26
PIF_VALUE: 26
PIF_VALUE: 27
PIF_VALUE: 26
PIF_VALUE: 27
PIF_VALUE: 26
PIF_VALUE: 27
PIF_VALUE: 26
PIF_VALUE: 27
PIF_VALUE: 26
PIF_VALUE: 27
PIF_VALUE: 26
PIF_VALUE: 26
PIF_VALUE: 27
PIF_VALUE: 26
PIF_VALUE: 26

## 2019-06-21 ASSESSMENT — PAIN SCALES - GENERAL
PAINLEVEL_OUTOF10: 0
PAINLEVEL_OUTOF10: 6
PAINLEVEL_OUTOF10: 9
PAINLEVEL_OUTOF10: 0
PAINLEVEL_OUTOF10: 6
PAINLEVEL_OUTOF10: 3
PAINLEVEL_OUTOF10: 0
PAINLEVEL_OUTOF10: 0

## 2019-06-21 ASSESSMENT — PAIN DESCRIPTION - LOCATION
LOCATION: GENERALIZED
LOCATION: THROAT

## 2019-06-21 ASSESSMENT — PAIN DESCRIPTION - DESCRIPTORS: DESCRIPTORS: ACHING;DISCOMFORT

## 2019-06-21 ASSESSMENT — PAIN DESCRIPTION - PROGRESSION: CLINICAL_PROGRESSION: GRADUALLY WORSENING

## 2019-06-21 ASSESSMENT — PAIN DESCRIPTION - ORIENTATION
ORIENTATION: RIGHT
ORIENTATION: MID

## 2019-06-21 ASSESSMENT — PAIN DESCRIPTION - ONSET: ONSET: ON-GOING

## 2019-06-21 NOTE — PLAN OF CARE
Problem: Restraint Use - Nonviolent/Non-Self-Destructive Behavior:  Goal: Absence of restraint indications  Description  Absence of restraint indications   6/21/2019 0007 by John Negron RN  Outcome: Completed  6/20/2019 1902 by Taniya Pak RN  Outcome: Not Met This Shift  Goal: Absence of restraint-related injury  Description  Absence of restraint-related injury   6/21/2019 0007 by John Negron RN  Outcome: Completed  6/20/2019 1902 by Taniya Pak RN  Outcome: Met This Shift
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
## 2019-06-21 NOTE — PROGRESS NOTES
Nutrition Assessment (Enteral Nutrition)    Type and Reason for Visit: Reassess    Nutrition Recommendations: Modify current Tube Feeding  EN Recommendation: Fluid Restricted @60ml/hr w/ 1 protein modular daily to provide: 1440ml, 2880kcals, 120gm pro, 1008ml water (with protein modular daily: 2980kcals, 146gm pro)  Need for FR formula d/t high estimated needs to avoid high volume EN when fluids are currently +32.9L     Nutrition Assessment: Pt status remains stable at this time given continued NPO status, vent w/ need for EN. EN progressed from previous assessment s/p HIDA. Will provide updated EN recommendations to meet estimated needs. Malnutrition Assessment:  · Malnutrition Status: At risk for malnutrition  · Context: Acute illness or injury  · Findings of the 6 clinical characteristics of malnutrition (Minimum of 2 out of 6 clinical characteristics is required to make the diagnosis of moderate or severe Protein Calorie Malnutrition based on AND/ASPEN Guidelines):  1. Energy Intake-Less than or equal to 75% of estimated energy requirement, Greater than or equal to 7 days    2. Weight Loss-Unable to assess, unable to assess  3. Fat Loss-No significant subcutaneous fat loss,    4. Muscle Loss-No significant muscle mass loss,    5. Fluid Accumulation-No significant fluid accumulation,    6.  Strength-Not measured    Nutrition Risk Level:  Moderate    Nutrition Needs:  · Estimated Daily Total Kcal: (PS3B Tmax 38.3, MV 11.2; 3002)  · Estimated Daily Protein (g): 140-160(1.8-2.0)  · Estimated Daily Fluid (ml/day): per critical care     Nutrition Diagnosis:   · Problem: Inadequate oral intake  · Etiology: related to Impaired respiratory function-inability to consume food     Signs and symptoms:  as evidenced by NPO status due to medical condition, Intubation, Nutrition support - EN    Objective Information:  · Nutrition-Focused Physical Findings: vent, OGT, hypoactive BS, soft abd, +2 generalized edema, + I/Os   · Wound Type: Surgical Wound, Multiple  · Current Nutrition Therapies:  · Oral Diet Orders: NPO   · Tube Feeding (TF) Orders:   · Feeding Route: Orogastric  · Formula: Standard w/Fiber  · Rate (ml/hr):50ml/hr    · Volume (ml/day): 1200ml TV   · Duration: Continuous  · Water Flushes: 200ml Q6  · Current TF & Flush Orders Provides: 1440kcals, 67gm pro, 968ml water, 1768ml total water   · Anthropometric Measures:  · Ht: 6' (182.9 cm)   · Current Body Wt: 423 lb (191.9 kg)(6/21 actual)  · Admission Body Wt: 339 lb (153.8 kg)(6/12 actual)  · Usual Body Wt: (UTO)  · Weight Change: noted wt gain since admit, current fluids + at this time. unable to assess overall wt hx at this time    · Ideal Body Wt: 178 lb (80.7 kg), % Ideal Body 190%(using admit wt )  · BMI Classification: BMI > or equal to 40.0 Obese Class III    Nutrition Interventions:   Continued Inpatient Monitoring, Education not appropriate at this time, Coordination of Care    Nutrition Evaluation:   · Evaluation: Goals set   · Goals:  Tolerance to EN    · Monitoring: TF Intake, TF Tolerance, Skin Integrity, Wound Healing, I&O, Monitor Hemodynamic Status, Mental Status/Confusion, Weight, Pertinent Labs, Monitor Bowel Function      Electronically signed by Adan Homans, MS, RD, LD on 6/21/19 at 3:04 PM  Contact Number: [phone]

## 2019-06-22 ENCOUNTER — APPOINTMENT (OUTPATIENT)
Dept: CT IMAGING | Age: 24
DRG: 003 | End: 2019-06-22
Payer: OTHER MISCELLANEOUS

## 2019-06-22 ENCOUNTER — APPOINTMENT (OUTPATIENT)
Dept: GENERAL RADIOLOGY | Age: 24
DRG: 003 | End: 2019-06-22
Payer: OTHER MISCELLANEOUS

## 2019-06-22 LAB
AADO2: 154.5 MMHG
AADO2: 207.9 MMHG
AADO2: 490.9 MMHG
AADO2: 550.3 MMHG
ALBUMIN SERPL-MCNC: 2.9 G/DL (ref 3.5–5.2)
ALP BLD-CCNC: 180 U/L (ref 40–129)
ALT SERPL-CCNC: 110 U/L (ref 0–40)
ANION GAP SERPL CALCULATED.3IONS-SCNC: 9 MMOL/L (ref 7–16)
ANISOCYTOSIS: ABNORMAL
AST SERPL-CCNC: 76 U/L (ref 0–39)
B.E.: 5.5 MMOL/L (ref -3–3)
B.E.: 6.6 MMOL/L (ref -3–3)
B.E.: 6.8 MMOL/L (ref -3–3)
B.E.: 7.6 MMOL/L (ref -3–3)
BASOPHILS ABSOLUTE: 0.16 E9/L (ref 0–0.2)
BASOPHILS RELATIVE PERCENT: 0.9 % (ref 0–2)
BILIRUB SERPL-MCNC: 1.4 MG/DL (ref 0–1.2)
BLOOD CULTURE, ROUTINE: NORMAL
BUN BLDV-MCNC: 14 MG/DL (ref 6–20)
CALCIUM IONIZED: 1.12 MMOL/L (ref 1.15–1.33)
CALCIUM SERPL-MCNC: 7.9 MG/DL (ref 8.6–10.2)
CHLORIDE BLD-SCNC: 92 MMOL/L (ref 98–107)
CO2: 31 MMOL/L (ref 22–29)
COHB: 1.2 % (ref 0–1.5)
COHB: 1.2 % (ref 0–1.5)
COHB: 1.4 % (ref 0–1.5)
COHB: 1.4 % (ref 0–1.5)
CREAT SERPL-MCNC: 0.6 MG/DL (ref 0.7–1.2)
CRITICAL: ABNORMAL
CULTURE, BLOOD 2: NORMAL
DATE ANALYZED: ABNORMAL
DATE OF COLLECTION: ABNORMAL
EOSINOPHILS ABSOLUTE: 0.33 E9/L (ref 0.05–0.5)
EOSINOPHILS RELATIVE PERCENT: 1.8 % (ref 0–6)
FIO2: 100 %
FIO2: 100 %
FIO2: 40 %
FIO2: 50 %
GFR AFRICAN AMERICAN: >60
GFR NON-AFRICAN AMERICAN: >60 ML/MIN/1.73
GLUCOSE BLD-MCNC: 126 MG/DL (ref 74–99)
HCO3: 28.9 MMOL/L (ref 22–26)
HCO3: 30.4 MMOL/L (ref 22–26)
HCO3: 31.4 MMOL/L (ref 22–26)
HCO3: 31.7 MMOL/L (ref 22–26)
HCT VFR BLD CALC: 24.2 % (ref 37–54)
HEMOGLOBIN: 7.5 G/DL (ref 12.5–16.5)
HHB: 1.2 % (ref 0–5)
HHB: 2.4 % (ref 0–5)
HHB: 3 % (ref 0–5)
HHB: 4.2 % (ref 0–5)
HYPOCHROMIA: ABNORMAL
LAB: ABNORMAL
LYMPHOCYTES ABSOLUTE: 0.37 E9/L (ref 1.5–4)
LYMPHOCYTES RELATIVE PERCENT: 1.8 % (ref 20–42)
Lab: ABNORMAL
MAGNESIUM: 2.2 MG/DL (ref 1.6–2.6)
MCH RBC QN AUTO: 27.7 PG (ref 26–35)
MCHC RBC AUTO-ENTMCNC: 31 % (ref 32–34.5)
MCV RBC AUTO: 89.3 FL (ref 80–99.9)
METAMYELOCYTES RELATIVE PERCENT: 0.9 % (ref 0–1)
METHB: 0.5 % (ref 0–1.5)
METHB: 0.6 % (ref 0–1.5)
METHB: 0.6 % (ref 0–1.5)
METHB: 0.7 % (ref 0–1.5)
MODE: ABNORMAL
MODE: AC
MONOCYTES ABSOLUTE: 1.46 E9/L (ref 0.1–0.95)
MONOCYTES RELATIVE PERCENT: 7.9 % (ref 2–12)
MYELOCYTE PERCENT: 2.6 % (ref 0–0)
NEUTROPHILS ABSOLUTE: 16.1 E9/L (ref 1.8–7.3)
NEUTROPHILS RELATIVE PERCENT: 84.2 % (ref 43–80)
O2 CONTENT: 11.2 ML/DL
O2 SATURATION: 95.7 % (ref 92–98.5)
O2 SATURATION: 96.9 % (ref 92–98.5)
O2 SATURATION: 97.6 % (ref 92–98.5)
O2 SATURATION: 98.8 % (ref 92–98.5)
O2HB: 93.8 % (ref 94–97)
O2HB: 95.3 % (ref 94–97)
O2HB: 95.6 % (ref 94–97)
O2HB: 96.9 % (ref 94–97)
OPERATOR ID: 2577
OPERATOR ID: 2577
OPERATOR ID: ABNORMAL
OPERATOR ID: ABNORMAL
PATIENT TEMP: 37 C
PCO2: 37.6 MMHG (ref 35–45)
PCO2: 40.1 MMHG (ref 35–45)
PCO2: 40.9 MMHG (ref 35–45)
PCO2: 47.5 MMHG (ref 35–45)
PDW BLD-RTO: 16.2 FL (ref 11.5–15)
PEEP/CPAP: 10 CMH2O
PEEP/CPAP: 12 CMH2O
PEEP/CPAP: 8 CMH2O
PEEP/CPAP: 8 CMH2O
PFO2: 1 MMHG/%
PFO2: 1.5 MMHG/%
PFO2: 1.82 MMHG/%
PFO2: 1.84 MMHG/%
PH BLOOD GAS: 7.44 (ref 7.35–7.45)
PH BLOOD GAS: 7.5 (ref 7.35–7.45)
PHOSPHORUS: 3.8 MG/DL (ref 2.5–4.5)
PLATELET # BLD: 442 E9/L (ref 130–450)
PMV BLD AUTO: 9.2 FL (ref 7–12)
PO2: 100.1 MMHG (ref 60–100)
PO2: 149.6 MMHG (ref 60–100)
PO2: 73.7 MMHG (ref 60–100)
PO2: 91 MMHG (ref 60–100)
POLYCHROMASIA: ABNORMAL
POTASSIUM SERPL-SCNC: 4.1 MMOL/L (ref 3.5–5)
PS: 16 CMH20
RBC # BLD: 2.71 E12/L (ref 3.8–5.8)
RI(T): 2.28
RI(T): 210 %
RI(T): 3.28
RI(T): 5.5
RR MECHANICAL: 20 B/MIN
SODIUM BLD-SCNC: 132 MMOL/L (ref 132–146)
SOURCE, BLOOD GAS: ABNORMAL
THB: 8 G/DL (ref 11.5–16.5)
THB: 8.4 G/DL (ref 11.5–16.5)
THB: 8.4 G/DL (ref 11.5–16.5)
THB: 8.7 G/DL (ref 11.5–16.5)
TIME ANALYZED: 1115
TIME ANALYZED: 1812
TIME ANALYZED: 353
TIME ANALYZED: 532
TOTAL PROTEIN: 6.7 G/DL (ref 6.4–8.3)
VT MECHANICAL: 500 ML
WBC # BLD: 18.3 E9/L (ref 4.5–11.5)

## 2019-06-22 PROCEDURE — 36569 INSJ PICC 5 YR+ W/O IMAGING: CPT

## 2019-06-22 PROCEDURE — 2580000003 HC RX 258: Performed by: RADIOLOGY

## 2019-06-22 PROCEDURE — 0BJ08ZZ INSPECTION OF TRACHEOBRONCHIAL TREE, VIA NATURAL OR ARTIFICIAL OPENING ENDOSCOPIC: ICD-10-PCS | Performed by: SURGERY

## 2019-06-22 PROCEDURE — 2580000003 HC RX 258: Performed by: STUDENT IN AN ORGANIZED HEALTH CARE EDUCATION/TRAINING PROGRAM

## 2019-06-22 PROCEDURE — 6360000002 HC RX W HCPCS: Performed by: STUDENT IN AN ORGANIZED HEALTH CARE EDUCATION/TRAINING PROGRAM

## 2019-06-22 PROCEDURE — 76937 US GUIDE VASCULAR ACCESS: CPT

## 2019-06-22 PROCEDURE — 2500000003 HC RX 250 WO HCPCS: Performed by: STUDENT IN AN ORGANIZED HEALTH CARE EDUCATION/TRAINING PROGRAM

## 2019-06-22 PROCEDURE — 2580000003 HC RX 258: Performed by: SURGERY

## 2019-06-22 PROCEDURE — 6370000000 HC RX 637 (ALT 250 FOR IP): Performed by: STUDENT IN AN ORGANIZED HEALTH CARE EDUCATION/TRAINING PROGRAM

## 2019-06-22 PROCEDURE — 2500000003 HC RX 250 WO HCPCS

## 2019-06-22 PROCEDURE — 94003 VENT MGMT INPAT SUBQ DAY: CPT

## 2019-06-22 PROCEDURE — 82805 BLOOD GASES W/O2 SATURATION: CPT

## 2019-06-22 PROCEDURE — 99291 CRITICAL CARE FIRST HOUR: CPT | Performed by: SURGERY

## 2019-06-22 PROCEDURE — 36592 COLLECT BLOOD FROM PICC: CPT

## 2019-06-22 PROCEDURE — 2000000000 HC ICU R&B

## 2019-06-22 PROCEDURE — 94640 AIRWAY INHALATION TREATMENT: CPT

## 2019-06-22 PROCEDURE — 85025 COMPLETE CBC W/AUTO DIFF WBC: CPT

## 2019-06-22 PROCEDURE — 6360000004 HC RX CONTRAST MEDICATION: Performed by: RADIOLOGY

## 2019-06-22 PROCEDURE — 36415 COLL VENOUS BLD VENIPUNCTURE: CPT

## 2019-06-22 PROCEDURE — 6370000000 HC RX 637 (ALT 250 FOR IP)

## 2019-06-22 PROCEDURE — 2580000003 HC RX 258

## 2019-06-22 PROCEDURE — 6370000000 HC RX 637 (ALT 250 FOR IP): Performed by: SURGERY

## 2019-06-22 PROCEDURE — 71045 X-RAY EXAM CHEST 1 VIEW: CPT

## 2019-06-22 PROCEDURE — 87070 CULTURE OTHR SPECIMN AEROBIC: CPT

## 2019-06-22 PROCEDURE — 6360000002 HC RX W HCPCS: Performed by: SURGERY

## 2019-06-22 PROCEDURE — 82330 ASSAY OF CALCIUM: CPT

## 2019-06-22 PROCEDURE — 71260 CT THORAX DX C+: CPT

## 2019-06-22 PROCEDURE — 84100 ASSAY OF PHOSPHORUS: CPT

## 2019-06-22 PROCEDURE — 80053 COMPREHEN METABOLIC PANEL: CPT

## 2019-06-22 PROCEDURE — 83735 ASSAY OF MAGNESIUM: CPT

## 2019-06-22 PROCEDURE — 6360000002 HC RX W HCPCS

## 2019-06-22 PROCEDURE — 31645 BRNCHSC W/THER ASPIR 1ST: CPT | Performed by: SURGERY

## 2019-06-22 PROCEDURE — 02HV33Z INSERTION OF INFUSION DEVICE INTO SUPERIOR VENA CAVA, PERCUTANEOUS APPROACH: ICD-10-PCS | Performed by: SURGERY

## 2019-06-22 PROCEDURE — 31624 DX BRONCHOSCOPE/LAVAGE: CPT

## 2019-06-22 PROCEDURE — 87206 SMEAR FLUORESCENT/ACID STAI: CPT

## 2019-06-22 PROCEDURE — 74177 CT ABD & PELVIS W/CONTRAST: CPT

## 2019-06-22 PROCEDURE — C1751 CATH, INF, PER/CENT/MIDLINE: HCPCS

## 2019-06-22 RX ORDER — VECURONIUM BROMIDE 1 MG/ML
10 INJECTION, POWDER, LYOPHILIZED, FOR SOLUTION INTRAVENOUS ONCE
Status: COMPLETED | OUTPATIENT
Start: 2019-06-22 | End: 2019-06-22

## 2019-06-22 RX ORDER — MIDAZOLAM HYDROCHLORIDE 1 MG/ML
INJECTION INTRAMUSCULAR; INTRAVENOUS
Status: COMPLETED
Start: 2019-06-22 | End: 2019-06-22

## 2019-06-22 RX ORDER — VECURONIUM BROMIDE 1 MG/ML
INJECTION, POWDER, LYOPHILIZED, FOR SOLUTION INTRAVENOUS
Status: COMPLETED
Start: 2019-06-22 | End: 2019-06-22

## 2019-06-22 RX ORDER — SODIUM CHLORIDE 0.9 % (FLUSH) 0.9 %
10 SYRINGE (ML) INJECTION
Status: COMPLETED | OUTPATIENT
Start: 2019-06-22 | End: 2019-06-22

## 2019-06-22 RX ORDER — FENTANYL CITRATE 50 UG/ML
50 INJECTION, SOLUTION INTRAMUSCULAR; INTRAVENOUS
Status: DISCONTINUED | OUTPATIENT
Start: 2019-06-22 | End: 2019-06-22

## 2019-06-22 RX ORDER — MIDAZOLAM HYDROCHLORIDE 1 MG/ML
4 INJECTION INTRAMUSCULAR; INTRAVENOUS ONCE
Status: COMPLETED | OUTPATIENT
Start: 2019-06-22 | End: 2019-06-22

## 2019-06-22 RX ORDER — PROPOFOL 10 MG/ML
10 INJECTION, EMULSION INTRAVENOUS
Status: DISCONTINUED | OUTPATIENT
Start: 2019-06-22 | End: 2019-06-24

## 2019-06-22 RX ORDER — LIDOCAINE HYDROCHLORIDE AND EPINEPHRINE 10; 10 MG/ML; UG/ML
10 INJECTION, SOLUTION INFILTRATION; PERINEURAL ONCE
Status: DISCONTINUED | OUTPATIENT
Start: 2019-06-22 | End: 2019-06-24

## 2019-06-22 RX ORDER — PROPOFOL 10 MG/ML
INJECTION, EMULSION INTRAVENOUS
Status: COMPLETED
Start: 2019-06-22 | End: 2019-06-22

## 2019-06-22 RX ORDER — FENTANYL CITRATE 50 UG/ML
INJECTION, SOLUTION INTRAMUSCULAR; INTRAVENOUS
Status: COMPLETED
Start: 2019-06-22 | End: 2019-06-22

## 2019-06-22 RX ORDER — FENTANYL CITRATE 50 UG/ML
200 INJECTION, SOLUTION INTRAMUSCULAR; INTRAVENOUS ONCE
Status: COMPLETED | OUTPATIENT
Start: 2019-06-22 | End: 2019-06-22

## 2019-06-22 RX ORDER — MIDAZOLAM HYDROCHLORIDE 1 MG/ML
INJECTION INTRAMUSCULAR; INTRAVENOUS
Status: DISPENSED
Start: 2019-06-22 | End: 2019-06-22

## 2019-06-22 RX ADMIN — BACITRACIN ZINC: 500 OINTMENT TOPICAL at 08:49

## 2019-06-22 RX ADMIN — SODIUM CHLORIDE 2 MCG/KG/MIN: 9 INJECTION, SOLUTION INTRAVENOUS at 11:22

## 2019-06-22 RX ADMIN — PANTOPRAZOLE SODIUM 40 MG: 40 GRANULE, DELAYED RELEASE ORAL at 06:13

## 2019-06-22 RX ADMIN — BACITRACIN ZINC: 500 OINTMENT TOPICAL at 20:38

## 2019-06-22 RX ADMIN — Medication 10 ML: at 11:30

## 2019-06-22 RX ADMIN — WATER 10 ML: 1 INJECTION INTRAMUSCULAR; INTRAVENOUS; SUBCUTANEOUS at 04:24

## 2019-06-22 RX ADMIN — DOCUSATE SODIUM 100 MG: 50 LIQUID ORAL at 20:38

## 2019-06-22 RX ADMIN — CHLORHEXIDINE GLUCONATE 0.12% ORAL RINSE 15 ML: 1.2 LIQUID ORAL at 08:50

## 2019-06-22 RX ADMIN — IPRATROPIUM BROMIDE AND ALBUTEROL SULFATE 1 AMPULE: .5; 3 SOLUTION RESPIRATORY (INHALATION) at 16:42

## 2019-06-22 RX ADMIN — ENOXAPARIN SODIUM 40 MG: 40 INJECTION, SOLUTION INTRAVENOUS; SUBCUTANEOUS at 08:49

## 2019-06-22 RX ADMIN — ACETAMINOPHEN ORAL SOLUTION 650 MG: 650 SOLUTION ORAL at 03:53

## 2019-06-22 RX ADMIN — Medication 10 ML: at 01:07

## 2019-06-22 RX ADMIN — MIDAZOLAM HYDROCHLORIDE 4 MG: 1 INJECTION INTRAMUSCULAR; INTRAVENOUS at 04:23

## 2019-06-22 RX ADMIN — Medication 10 ML: at 08:51

## 2019-06-22 RX ADMIN — CEFAZOLIN 3 G: 10 INJECTION, POWDER, FOR SOLUTION INTRAVENOUS; PARENTERAL at 09:37

## 2019-06-22 RX ADMIN — BACITRACIN ZINC: 500 OINTMENT TOPICAL at 14:37

## 2019-06-22 RX ADMIN — PROPOFOL 40 MCG/KG/MIN: 10 INJECTION, EMULSION INTRAVENOUS at 12:49

## 2019-06-22 RX ADMIN — IPRATROPIUM BROMIDE AND ALBUTEROL SULFATE 1 AMPULE: .5; 3 SOLUTION RESPIRATORY (INHALATION) at 07:58

## 2019-06-22 RX ADMIN — FLUCONAZOLE 200 MG: 40 POWDER, FOR SUSPENSION ORAL at 08:51

## 2019-06-22 RX ADMIN — IPRATROPIUM BROMIDE AND ALBUTEROL SULFATE 1 AMPULE: .5; 3 SOLUTION RESPIRATORY (INHALATION) at 19:44

## 2019-06-22 RX ADMIN — OXYCODONE HYDROCHLORIDE 20 MG: 100 SOLUTION ORAL at 14:39

## 2019-06-22 RX ADMIN — LACTULOSE 20 G: 20 SOLUTION ORAL at 23:42

## 2019-06-22 RX ADMIN — METHOCARBAMOL TABLETS 1000 MG: 500 TABLET, COATED ORAL at 03:54

## 2019-06-22 RX ADMIN — OXYCODONE HYDROCHLORIDE 20 MG: 100 SOLUTION ORAL at 03:53

## 2019-06-22 RX ADMIN — VECURONIUM BROMIDE 10 MG: 1 INJECTION, POWDER, LYOPHILIZED, FOR SOLUTION INTRAVENOUS at 04:24

## 2019-06-22 RX ADMIN — ACETAMINOPHEN ORAL SOLUTION 650 MG: 650 SOLUTION ORAL at 17:07

## 2019-06-22 RX ADMIN — FENTANYL CITRATE 200 MCG: 50 INJECTION, SOLUTION INTRAMUSCULAR; INTRAVENOUS at 09:27

## 2019-06-22 RX ADMIN — IOPAMIDOL 110 ML: 755 INJECTION, SOLUTION INTRAVENOUS at 11:30

## 2019-06-22 RX ADMIN — PROPOFOL 40 MCG/KG/MIN: 10 INJECTION, EMULSION INTRAVENOUS at 10:33

## 2019-06-22 RX ADMIN — METHOCARBAMOL TABLETS 1000 MG: 500 TABLET, COATED ORAL at 21:52

## 2019-06-22 RX ADMIN — IPRATROPIUM BROMIDE AND ALBUTEROL SULFATE 1 AMPULE: .5; 3 SOLUTION RESPIRATORY (INHALATION) at 12:07

## 2019-06-22 RX ADMIN — CEFAZOLIN 3 G: 10 INJECTION, POWDER, FOR SOLUTION INTRAVENOUS; PARENTERAL at 18:19

## 2019-06-22 RX ADMIN — ENOXAPARIN SODIUM 40 MG: 40 INJECTION, SOLUTION INTRAVENOUS; SUBCUTANEOUS at 20:38

## 2019-06-22 RX ADMIN — PROPOFOL 40 MCG/KG/MIN: 10 INJECTION, EMULSION INTRAVENOUS at 08:25

## 2019-06-22 RX ADMIN — CHLORHEXIDINE GLUCONATE 0.12% ORAL RINSE 15 ML: 1.2 LIQUID ORAL at 20:40

## 2019-06-22 RX ADMIN — Medication 100 MCG/HR: at 09:15

## 2019-06-22 RX ADMIN — PROPOFOL 40 MCG/KG/MIN: 10 INJECTION, EMULSION INTRAVENOUS at 06:00

## 2019-06-22 RX ADMIN — Medication 10 ML: at 20:39

## 2019-06-22 RX ADMIN — LORAZEPAM 2 MG: 2 INJECTION INTRAMUSCULAR; INTRAVENOUS at 01:06

## 2019-06-22 RX ADMIN — PROPOFOL 30 MCG/KG/MIN: 10 INJECTION, EMULSION INTRAVENOUS at 04:17

## 2019-06-22 RX ADMIN — OXYCODONE HYDROCHLORIDE 20 MG: 100 SOLUTION ORAL at 20:39

## 2019-06-22 RX ADMIN — FENTANYL CITRATE 200 MCG: 50 INJECTION INTRAMUSCULAR; INTRAVENOUS at 09:27

## 2019-06-22 RX ADMIN — DOCUSATE SODIUM 100 MG: 50 LIQUID ORAL at 08:49

## 2019-06-22 RX ADMIN — CEFAZOLIN 3 G: 10 INJECTION, POWDER, FOR SOLUTION INTRAVENOUS; PARENTERAL at 02:03

## 2019-06-22 RX ADMIN — LACTULOSE 20 G: 20 SOLUTION ORAL at 17:07

## 2019-06-22 RX ADMIN — BENZOCAINE, BUTAMBEN, AND TETRACAINE HYDROCHLORIDE: .028; .004; .004 AEROSOL, SPRAY TOPICAL at 05:34

## 2019-06-22 RX ADMIN — METHOCARBAMOL TABLETS 1000 MG: 500 TABLET, COATED ORAL at 16:28

## 2019-06-22 RX ADMIN — OXYCODONE HYDROCHLORIDE 20 MG: 100 SOLUTION ORAL at 08:49

## 2019-06-22 RX ADMIN — CLONIDINE HYDROCHLORIDE 0.1 MG: 0.1 TABLET ORAL at 14:32

## 2019-06-22 RX ADMIN — METHOCARBAMOL TABLETS 1000 MG: 500 TABLET, COATED ORAL at 09:49

## 2019-06-22 RX ADMIN — CLONIDINE HYDROCHLORIDE 0.1 MG: 0.1 TABLET ORAL at 21:51

## 2019-06-22 RX ADMIN — CALCIUM GLUCONATE 2 G: 98 INJECTION, SOLUTION INTRAVENOUS at 19:04

## 2019-06-22 RX ADMIN — SENNOSIDES 10 ML: 8.8 SYRUP ORAL at 20:41

## 2019-06-22 RX ADMIN — FUROSEMIDE 10 MG/HR: 10 INJECTION, SOLUTION INTRAMUSCULAR; INTRAVENOUS at 14:37

## 2019-06-22 RX ADMIN — FUROSEMIDE 10 MG/HR: 10 INJECTION, SOLUTION INTRAMUSCULAR; INTRAVENOUS at 04:24

## 2019-06-22 RX ADMIN — MIDAZOLAM 4 MG: 1 INJECTION INTRAMUSCULAR; INTRAVENOUS at 04:23

## 2019-06-22 RX ADMIN — SODIUM CHLORIDE 2 MCG/KG/MIN: 9 INJECTION, SOLUTION INTRAVENOUS at 04:48

## 2019-06-22 ASSESSMENT — PULMONARY FUNCTION TESTS
PIF_VALUE: 32
PIF_VALUE: 27
PIF_VALUE: 36
PIF_VALUE: 44
PIF_VALUE: 31
PIF_VALUE: 27
PIF_VALUE: 37
PIF_VALUE: 40
PIF_VALUE: 37
PIF_VALUE: 34
PIF_VALUE: 35
PIF_VALUE: 39
PIF_VALUE: 29
PIF_VALUE: 38
PIF_VALUE: 30
PIF_VALUE: 37
PIF_VALUE: 26
PIF_VALUE: 31
PIF_VALUE: 30
PIF_VALUE: 31
PIF_VALUE: 38
PIF_VALUE: 36
PIF_VALUE: 39
PIF_VALUE: 87
PIF_VALUE: 36
PIF_VALUE: 32
PIF_VALUE: 30
PIF_VALUE: 31
PIF_VALUE: 33
PIF_VALUE: 27
PIF_VALUE: 36
PIF_VALUE: 39
PIF_VALUE: 30

## 2019-06-22 ASSESSMENT — PAIN SCALES - GENERAL
PAINLEVEL_OUTOF10: 0

## 2019-06-22 ASSESSMENT — PAIN DESCRIPTION - LOCATION: LOCATION: NECK

## 2019-06-22 NOTE — PROGRESS NOTES
POWER PICC LINE Placement 6/22/2019    Product number: [id number]   Lot Number: [id number]      Ultrasound: YES WITH VPS TIP CONFIRMATION   Anatomy; iv placement site: RIGHT BASILIC VEIN. Upper Arm Circumference: 38 CM    Size: 5 FR DL    Exposed Length: 0 CM    Internal Length: 43 CM   Cut: 7 CM   Vein Measurement: 0.66 CM    Nurys Cannon  6/22/2019  10:44 AM    PICC PLACED WITH VPS TIP CONFIRMATION. . TIP IN LOWER 1/3 SVC/CAJ.

## 2019-06-22 NOTE — PROGRESS NOTES
06/22/19 0440   Cough/Sputum   Sputum How Obtained RT Bronchoscopy   Sputum Amount Large  (FROM ETT TUBE)   Sputum Color Tan;Brown   Tenacity Thick; Tenacious   Scope ID 30   Time out performed Yes   Reason for procedure Other (comment)  (FOR PLUGGED TUBE)   Ordering provider   (DR GORE/ARBEN)   Patient was bronched with number 30 and the ETT was found to be plugged with a lot of mucus. Changed patient AC MODE for the procedure and kept on afterwards increased peep to 12 and decreased tidal volume to 500.

## 2019-06-22 NOTE — PROGRESS NOTES
06/22/19 0454   Vent Patient Data   High Peep/I Pressure 0   Peak Inspiratory Pressure 39 cmH2O   Mean Airway Pressure 21 cmH20   Rate Measured 24 br/min   Vt Exhaled 433 mL   Minute Volume 13.4 Liters   I:E Ratio 1:3.20   Plateau Pressure 29 GAP81   Static Compliance 34 mL/cmH2O   Changed patient to AC 20 500 100 12
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
## 2019-06-23 ENCOUNTER — APPOINTMENT (OUTPATIENT)
Dept: GENERAL RADIOLOGY | Age: 24
DRG: 003 | End: 2019-06-23
Payer: OTHER MISCELLANEOUS

## 2019-06-23 LAB
AADO2: 117.7 MMHG
AADO2: 126.9 MMHG
AADO2: 139.1 MMHG
AADO2: 148 MMHG
ALBUMIN SERPL-MCNC: 2.8 G/DL (ref 3.5–5.2)
ALP BLD-CCNC: 182 U/L (ref 40–129)
ALT SERPL-CCNC: 86 U/L (ref 0–40)
ANION GAP SERPL CALCULATED.3IONS-SCNC: 11 MMOL/L (ref 7–16)
ANION GAP SERPL CALCULATED.3IONS-SCNC: 7 MMOL/L (ref 7–16)
ANISOCYTOSIS: ABNORMAL
AST SERPL-CCNC: 64 U/L (ref 0–39)
B.E.: 5.3 MMOL/L (ref -3–3)
B.E.: 5.6 MMOL/L (ref -3–3)
B.E.: 5.9 MMOL/L (ref -3–3)
B.E.: 6.7 MMOL/L (ref -3–3)
BASOPHILS ABSOLUTE: 0 E9/L (ref 0–0.2)
BASOPHILS RELATIVE PERCENT: 0.2 % (ref 0–2)
BILIRUB SERPL-MCNC: 1.4 MG/DL (ref 0–1.2)
BUN BLDV-MCNC: 15 MG/DL (ref 6–20)
BUN BLDV-MCNC: 15 MG/DL (ref 6–20)
CALCIUM IONIZED: 1.11 MMOL/L (ref 1.15–1.33)
CALCIUM SERPL-MCNC: 7.8 MG/DL (ref 8.6–10.2)
CALCIUM SERPL-MCNC: 8.1 MG/DL (ref 8.6–10.2)
CHLORIDE BLD-SCNC: 94 MMOL/L (ref 98–107)
CHLORIDE BLD-SCNC: 94 MMOL/L (ref 98–107)
CO2: 32 MMOL/L (ref 22–29)
CO2: 35 MMOL/L (ref 22–29)
COHB: 1 % (ref 0–1.5)
COHB: 1.1 % (ref 0–1.5)
COHB: 1.4 % (ref 0–1.5)
COHB: 1.4 % (ref 0–1.5)
CREAT SERPL-MCNC: 0.6 MG/DL (ref 0.7–1.2)
CREAT SERPL-MCNC: 0.7 MG/DL (ref 0.7–1.2)
CRITICAL: ABNORMAL
DATE ANALYZED: ABNORMAL
DATE OF COLLECTION: ABNORMAL
EOSINOPHILS ABSOLUTE: 0.18 E9/L (ref 0.05–0.5)
EOSINOPHILS RELATIVE PERCENT: 0.9 % (ref 0–6)
FIO2: 40 %
GFR AFRICAN AMERICAN: >60
GFR AFRICAN AMERICAN: >60
GFR NON-AFRICAN AMERICAN: >60 ML/MIN/1.73
GFR NON-AFRICAN AMERICAN: >60 ML/MIN/1.73
GLUCOSE BLD-MCNC: 129 MG/DL (ref 74–99)
GLUCOSE BLD-MCNC: 138 MG/DL (ref 74–99)
HCO3: 28.8 MMOL/L (ref 22–26)
HCO3: 29.4 MMOL/L (ref 22–26)
HCO3: 29.8 MMOL/L (ref 22–26)
HCO3: 30.4 MMOL/L (ref 22–26)
HCT VFR BLD CALC: 23 % (ref 37–54)
HEMOGLOBIN: 7.2 G/DL (ref 12.5–16.5)
HHB: 1.7 % (ref 0–5)
HHB: 2.5 % (ref 0–5)
HHB: 3 % (ref 0–5)
HHB: 4 % (ref 0–5)
HYPOCHROMIA: ABNORMAL
LAB: ABNORMAL
LYMPHOCYTES ABSOLUTE: 1.19 E9/L (ref 1.5–4)
LYMPHOCYTES RELATIVE PERCENT: 6.1 % (ref 20–42)
Lab: ABNORMAL
MAGNESIUM: 2.2 MG/DL (ref 1.6–2.6)
MCH RBC QN AUTO: 27.5 PG (ref 26–35)
MCHC RBC AUTO-ENTMCNC: 31.3 % (ref 32–34.5)
MCV RBC AUTO: 87.8 FL (ref 80–99.9)
METAMYELOCYTES RELATIVE PERCENT: 1.8 % (ref 0–1)
METHB: 0.6 % (ref 0–1.5)
METHB: 0.6 % (ref 0–1.5)
METHB: 0.7 % (ref 0–1.5)
METHB: 0.7 % (ref 0–1.5)
MODE: ABNORMAL
MODE: ABNORMAL
MODE: AC
MODE: AC
MONOCYTES ABSOLUTE: 1.98 E9/L (ref 0.1–0.95)
MONOCYTES RELATIVE PERCENT: 9.6 % (ref 2–12)
MYELOCYTE PERCENT: 2.6 % (ref 0–0)
NEUTROPHILS ABSOLUTE: 16.43 E9/L (ref 1.8–7.3)
NEUTROPHILS RELATIVE PERCENT: 78.9 % (ref 43–80)
O2 CONTENT: 10.9 ML/DL
O2 CONTENT: 11.3 ML/DL
O2 SATURATION: 95.9 % (ref 92–98.5)
O2 SATURATION: 96.9 % (ref 92–98.5)
O2 SATURATION: 97.4 % (ref 92–98.5)
O2 SATURATION: 98.3 % (ref 92–98.5)
O2HB: 94 % (ref 94–97)
O2HB: 95.3 % (ref 94–97)
O2HB: 95.5 % (ref 94–97)
O2HB: 96.5 % (ref 94–97)
OPERATOR ID: 1874
OPERATOR ID: 1874
OPERATOR ID: ABNORMAL
OPERATOR ID: ABNORMAL
OVALOCYTES: ABNORMAL
PATIENT TEMP: 37 C
PCO2: 37.5 MMHG (ref 35–45)
PCO2: 39.6 MMHG (ref 35–45)
PCO2: 39.6 MMHG (ref 35–45)
PCO2: 40.4 MMHG (ref 35–45)
PDW BLD-RTO: 16.2 FL (ref 11.5–15)
PEEP/CPAP: 12 CMH2O
PFO2: 2.1 MMHG/%
PFO2: 2.24 MMHG/%
PFO2: 2.57 MMHG/%
PFO2: 2.8 MMHG/%
PH BLOOD GAS: 7.49 (ref 7.35–7.45)
PH BLOOD GAS: 7.49 (ref 7.35–7.45)
PH BLOOD GAS: 7.5 (ref 7.35–7.45)
PH BLOOD GAS: 7.5 (ref 7.35–7.45)
PHOSPHORUS: 3.9 MG/DL (ref 2.5–4.5)
PLATELET # BLD: 436 E9/L (ref 130–450)
PMV BLD AUTO: 9 FL (ref 7–12)
PO2: 102.8 MMHG (ref 60–100)
PO2: 112 MMHG (ref 60–100)
PO2: 84.1 MMHG (ref 60–100)
PO2: 89.6 MMHG (ref 60–100)
POIKILOCYTES: ABNORMAL
POLYCHROMASIA: ABNORMAL
POTASSIUM SERPL-SCNC: 3.8 MMOL/L (ref 3.5–5)
POTASSIUM SERPL-SCNC: 4.3 MMOL/L (ref 3.5–5)
PROCALCITONIN: 0.3 NG/ML (ref 0–0.08)
PS: 17 CMH20
PS: 17 CMH20
RBC # BLD: 2.62 E12/L (ref 3.8–5.8)
RI(T): 1.23
RI(T): 1.76
RI(T): 105 %
RI(T): 155 %
RR MECHANICAL: 20 B/MIN
RR MECHANICAL: 20 B/MIN
SODIUM BLD-SCNC: 136 MMOL/L (ref 132–146)
SODIUM BLD-SCNC: 137 MMOL/L (ref 132–146)
SOURCE, BLOOD GAS: ABNORMAL
STOMATOCYTES: ABNORMAL
THB: 7.4 G/DL (ref 11.5–16.5)
THB: 7.9 G/DL (ref 11.5–16.5)
THB: 8 G/DL (ref 11.5–16.5)
THB: 8.3 G/DL (ref 11.5–16.5)
TIME ANALYZED: 1147
TIME ANALYZED: 1817
TIME ANALYZED: 54
TIME ANALYZED: 627
TOTAL PROTEIN: 6.8 G/DL (ref 6.4–8.3)
VT MECHANICAL: 500 ML
VT MECHANICAL: 500 ML
WBC # BLD: 19.8 E9/L (ref 4.5–11.5)

## 2019-06-23 PROCEDURE — 6370000000 HC RX 637 (ALT 250 FOR IP): Performed by: STUDENT IN AN ORGANIZED HEALTH CARE EDUCATION/TRAINING PROGRAM

## 2019-06-23 PROCEDURE — 6360000002 HC RX W HCPCS: Performed by: STUDENT IN AN ORGANIZED HEALTH CARE EDUCATION/TRAINING PROGRAM

## 2019-06-23 PROCEDURE — 94640 AIRWAY INHALATION TREATMENT: CPT

## 2019-06-23 PROCEDURE — 2580000003 HC RX 258: Performed by: STUDENT IN AN ORGANIZED HEALTH CARE EDUCATION/TRAINING PROGRAM

## 2019-06-23 PROCEDURE — 36415 COLL VENOUS BLD VENIPUNCTURE: CPT

## 2019-06-23 PROCEDURE — 2000000000 HC ICU R&B

## 2019-06-23 PROCEDURE — 2580000003 HC RX 258: Performed by: SURGERY

## 2019-06-23 PROCEDURE — 6370000000 HC RX 637 (ALT 250 FOR IP): Performed by: SURGERY

## 2019-06-23 PROCEDURE — 83735 ASSAY OF MAGNESIUM: CPT

## 2019-06-23 PROCEDURE — 84145 PROCALCITONIN (PCT): CPT

## 2019-06-23 PROCEDURE — 6360000002 HC RX W HCPCS: Performed by: SURGERY

## 2019-06-23 PROCEDURE — 71045 X-RAY EXAM CHEST 1 VIEW: CPT

## 2019-06-23 PROCEDURE — 84100 ASSAY OF PHOSPHORUS: CPT

## 2019-06-23 PROCEDURE — 80048 BASIC METABOLIC PNL TOTAL CA: CPT

## 2019-06-23 PROCEDURE — 36620 INSERTION CATHETER ARTERY: CPT

## 2019-06-23 PROCEDURE — 82805 BLOOD GASES W/O2 SATURATION: CPT

## 2019-06-23 PROCEDURE — 94003 VENT MGMT INPAT SUBQ DAY: CPT

## 2019-06-23 PROCEDURE — 82330 ASSAY OF CALCIUM: CPT

## 2019-06-23 PROCEDURE — 80053 COMPREHEN METABOLIC PANEL: CPT

## 2019-06-23 PROCEDURE — 85025 COMPLETE CBC W/AUTO DIFF WBC: CPT

## 2019-06-23 RX ORDER — QUETIAPINE FUMARATE 25 MG/1
50 TABLET, FILM COATED ORAL NIGHTLY
Status: DISCONTINUED | OUTPATIENT
Start: 2019-06-23 | End: 2019-06-26

## 2019-06-23 RX ORDER — CHOLECALCIFEROL (VITAMIN D3) 125 MCG
10 CAPSULE ORAL NIGHTLY
Status: DISCONTINUED | OUTPATIENT
Start: 2019-06-23 | End: 2019-06-28 | Stop reason: HOSPADM

## 2019-06-23 RX ADMIN — Medication 10 ML: at 09:56

## 2019-06-23 RX ADMIN — IPRATROPIUM BROMIDE AND ALBUTEROL SULFATE 1 AMPULE: .5; 3 SOLUTION RESPIRATORY (INHALATION) at 16:08

## 2019-06-23 RX ADMIN — CLONIDINE HYDROCHLORIDE 0.1 MG: 0.1 TABLET ORAL at 05:27

## 2019-06-23 RX ADMIN — CEFAZOLIN 3 G: 10 INJECTION, POWDER, FOR SOLUTION INTRAVENOUS; PARENTERAL at 17:01

## 2019-06-23 RX ADMIN — BACITRACIN ZINC: 500 OINTMENT TOPICAL at 09:55

## 2019-06-23 RX ADMIN — BACITRACIN ZINC: 500 OINTMENT TOPICAL at 14:33

## 2019-06-23 RX ADMIN — IPRATROPIUM BROMIDE AND ALBUTEROL SULFATE 1 AMPULE: .5; 3 SOLUTION RESPIRATORY (INHALATION) at 08:05

## 2019-06-23 RX ADMIN — PANTOPRAZOLE SODIUM 40 MG: 40 GRANULE, DELAYED RELEASE ORAL at 05:30

## 2019-06-23 RX ADMIN — CLONIDINE HYDROCHLORIDE 0.1 MG: 0.1 TABLET ORAL at 21:50

## 2019-06-23 RX ADMIN — ACETAMINOPHEN ORAL SOLUTION 650 MG: 650 SOLUTION ORAL at 05:27

## 2019-06-23 RX ADMIN — ENOXAPARIN SODIUM 40 MG: 40 INJECTION, SOLUTION INTRAVENOUS; SUBCUTANEOUS at 09:54

## 2019-06-23 RX ADMIN — CHLORHEXIDINE GLUCONATE 0.12% ORAL RINSE 15 ML: 1.2 LIQUID ORAL at 09:54

## 2019-06-23 RX ADMIN — HEPARIN 300 UNITS: 100 SYRINGE at 21:39

## 2019-06-23 RX ADMIN — SENNOSIDES 10 ML: 8.8 SYRUP ORAL at 21:37

## 2019-06-23 RX ADMIN — OXYCODONE HYDROCHLORIDE 20 MG: 100 SOLUTION ORAL at 21:30

## 2019-06-23 RX ADMIN — IPRATROPIUM BROMIDE AND ALBUTEROL SULFATE 1 AMPULE: .5; 3 SOLUTION RESPIRATORY (INHALATION) at 20:00

## 2019-06-23 RX ADMIN — CLONIDINE HYDROCHLORIDE 0.1 MG: 0.1 TABLET ORAL at 14:32

## 2019-06-23 RX ADMIN — FLUCONAZOLE 200 MG: 40 POWDER, FOR SUSPENSION ORAL at 09:59

## 2019-06-23 RX ADMIN — DOCUSATE SODIUM 100 MG: 50 LIQUID ORAL at 21:30

## 2019-06-23 RX ADMIN — METHOCARBAMOL TABLETS 1000 MG: 500 TABLET, COATED ORAL at 09:53

## 2019-06-23 RX ADMIN — OXYCODONE HYDROCHLORIDE 20 MG: 100 SOLUTION ORAL at 02:37

## 2019-06-23 RX ADMIN — CEFAZOLIN 3 G: 10 INJECTION, POWDER, FOR SOLUTION INTRAVENOUS; PARENTERAL at 02:04

## 2019-06-23 RX ADMIN — LACTULOSE 20 G: 20 SOLUTION ORAL at 05:27

## 2019-06-23 RX ADMIN — IPRATROPIUM BROMIDE AND ALBUTEROL SULFATE 1 AMPULE: .5; 3 SOLUTION RESPIRATORY (INHALATION) at 12:05

## 2019-06-23 RX ADMIN — CEFAZOLIN 3 G: 10 INJECTION, POWDER, FOR SOLUTION INTRAVENOUS; PARENTERAL at 09:55

## 2019-06-23 RX ADMIN — METHOCARBAMOL TABLETS 1000 MG: 500 TABLET, COATED ORAL at 16:40

## 2019-06-23 RX ADMIN — METHOCARBAMOL TABLETS 1000 MG: 500 TABLET, COATED ORAL at 21:37

## 2019-06-23 RX ADMIN — OXYCODONE HYDROCHLORIDE 20 MG: 100 SOLUTION ORAL at 09:55

## 2019-06-23 RX ADMIN — Medication 10 MG: at 21:35

## 2019-06-23 RX ADMIN — BACITRACIN ZINC: 500 OINTMENT TOPICAL at 21:35

## 2019-06-23 RX ADMIN — FUROSEMIDE 15 MG/HR: 10 INJECTION, SOLUTION INTRAMUSCULAR; INTRAVENOUS at 23:07

## 2019-06-23 RX ADMIN — FUROSEMIDE 10 MG/HR: 10 INJECTION, SOLUTION INTRAMUSCULAR; INTRAVENOUS at 07:52

## 2019-06-23 RX ADMIN — DOCUSATE SODIUM 100 MG: 50 LIQUID ORAL at 09:54

## 2019-06-23 RX ADMIN — FUROSEMIDE 15 MG/HR: 10 INJECTION, SOLUTION INTRAMUSCULAR; INTRAVENOUS at 15:30

## 2019-06-23 RX ADMIN — ACETAMINOPHEN ORAL SOLUTION 650 MG: 650 SOLUTION ORAL at 09:54

## 2019-06-23 RX ADMIN — ENOXAPARIN SODIUM 40 MG: 40 INJECTION, SOLUTION INTRAVENOUS; SUBCUTANEOUS at 21:30

## 2019-06-23 RX ADMIN — Medication 10 ML: at 21:37

## 2019-06-23 RX ADMIN — HEPARIN 300 UNITS: 100 SYRINGE at 09:58

## 2019-06-23 RX ADMIN — CHLORHEXIDINE GLUCONATE 0.12% ORAL RINSE 15 ML: 1.2 LIQUID ORAL at 21:40

## 2019-06-23 RX ADMIN — OXYCODONE HYDROCHLORIDE 20 MG: 100 SOLUTION ORAL at 14:32

## 2019-06-23 RX ADMIN — METHOCARBAMOL TABLETS 1000 MG: 500 TABLET, COATED ORAL at 04:10

## 2019-06-23 RX ADMIN — QUETIAPINE FUMARATE 50 MG: 25 TABLET ORAL at 21:31

## 2019-06-23 ASSESSMENT — PAIN SCALES - GENERAL
PAINLEVEL_OUTOF10: 0
PAINLEVEL_OUTOF10: 4
PAINLEVEL_OUTOF10: 0
PAINLEVEL_OUTOF10: 0
PAINLEVEL_OUTOF10: 2
PAINLEVEL_OUTOF10: 0
PAINLEVEL_OUTOF10: 6

## 2019-06-23 ASSESSMENT — PULMONARY FUNCTION TESTS
PIF_VALUE: 30
PIF_VALUE: 29
PIF_VALUE: 42
PIF_VALUE: 29
PIF_VALUE: 35
PIF_VALUE: 29
PIF_VALUE: 37
PIF_VALUE: 29
PIF_VALUE: 30
PIF_VALUE: 31
PIF_VALUE: 39
PIF_VALUE: 40
PIF_VALUE: 33
PIF_VALUE: 39
PIF_VALUE: 30
PIF_VALUE: 30
PIF_VALUE: 29
PIF_VALUE: 44
PIF_VALUE: 29
PIF_VALUE: 29
PIF_VALUE: 36
PIF_VALUE: 40
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
## 2019-06-23 NOTE — PLAN OF CARE
Problem: Falls - Risk of:  Goal: Will remain free from falls  Description  Will remain free from falls  Outcome: Met This Shift  Goal: Absence of physical injury  Description  Absence of physical injury  Outcome: Met This Shift     Problem: Risk for Impaired Skin Integrity  Goal: Tissue integrity - skin and mucous membranes  Description  Structural intactness and normal physiological function of skin and  mucous membranes. Outcome: Met This Shift     Problem: Musculor/Skeletal Functional Status  Goal: Highest potential functional level  Outcome: Met This Shift  Goal: Absence of falls  Outcome: Met This Shift     Problem: Pain:  Description  Pain management should include both nonpharmacologic and pharmacologic interventions.   Goal: Pain level will decrease  Description  Pain level will decrease  Outcome: Met This Shift  Goal: Control of acute pain  Description  Control of acute pain  Outcome: Met This Shift  Goal: Control of chronic pain  Description  Control of chronic pain  Outcome: Met This Shift     Problem: Airway Clearance - Ineffective:  Goal: Ability to maintain a clear airway will improve  Description  Ability to maintain a clear airway will improve  Outcome: Met This Shift
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
## 2019-06-24 ENCOUNTER — ANESTHESIA EVENT (OUTPATIENT)
Dept: OPERATING ROOM | Age: 24
DRG: 003 | End: 2019-06-24
Payer: OTHER MISCELLANEOUS

## 2019-06-24 ENCOUNTER — APPOINTMENT (OUTPATIENT)
Dept: GENERAL RADIOLOGY | Age: 24
DRG: 003 | End: 2019-06-24
Payer: OTHER MISCELLANEOUS

## 2019-06-24 LAB
AADO2: 106 MMHG
AADO2: 122.9 MMHG
AADO2: 123 MMHG
AADO2: 153.6 MMHG
ABO/RH: NORMAL
ALBUMIN SERPL-MCNC: 2.9 G/DL (ref 3.5–5.2)
ALP BLD-CCNC: 162 U/L (ref 40–129)
ALT SERPL-CCNC: 58 U/L (ref 0–40)
ANION GAP SERPL CALCULATED.3IONS-SCNC: 10 MMOL/L (ref 7–16)
ANTIBODY SCREEN: NORMAL
AST SERPL-CCNC: 45 U/L (ref 0–39)
B.E.: 2.6 MMOL/L (ref -3–3)
B.E.: 5.8 MMOL/L (ref -3–3)
B.E.: 6.4 MMOL/L (ref -3–3)
B.E.: 9.8 MMOL/L (ref -3–3)
BASOPHILIC STIPPLING: ABNORMAL
BASOPHILS ABSOLUTE: 0 E9/L (ref 0–0.2)
BASOPHILS RELATIVE PERCENT: 0.3 % (ref 0–2)
BILIRUB SERPL-MCNC: 1.1 MG/DL (ref 0–1.2)
BLOOD BANK DISPENSE STATUS: NORMAL
BLOOD BANK PRODUCT CODE: NORMAL
BPU ID: NORMAL
BUN BLDV-MCNC: 17 MG/DL (ref 6–20)
CALCIUM IONIZED: 1.12 MMOL/L (ref 1.15–1.33)
CALCIUM SERPL-MCNC: 8 MG/DL (ref 8.6–10.2)
CHLORIDE BLD-SCNC: 90 MMOL/L (ref 98–107)
CO2: 33 MMOL/L (ref 22–29)
COHB: 1.1 % (ref 0–1.5)
COHB: 1.5 % (ref 0–1.5)
COHB: 1.5 % (ref 0–1.5)
COHB: 1.6 % (ref 0–1.5)
CREAT SERPL-MCNC: 0.6 MG/DL (ref 0.7–1.2)
CRITICAL: ABNORMAL
DATE ANALYZED: ABNORMAL
DATE OF COLLECTION: ABNORMAL
DESCRIPTION BLOOD BANK: NORMAL
EOSINOPHILS ABSOLUTE: 0 E9/L (ref 0.05–0.5)
EOSINOPHILS RELATIVE PERCENT: 1.3 % (ref 0–6)
FIO2: 40 %
GFR AFRICAN AMERICAN: >60
GFR NON-AFRICAN AMERICAN: >60 ML/MIN/1.73
GLUCOSE BLD-MCNC: 133 MG/DL (ref 74–99)
HCO3: 26.8 MMOL/L (ref 22–26)
HCO3: 29.9 MMOL/L (ref 22–26)
HCO3: 30 MMOL/L (ref 22–26)
HCO3: 34.1 MMOL/L (ref 22–26)
HCT VFR BLD CALC: 22 % (ref 37–54)
HEMOGLOBIN: 6.8 G/DL (ref 12.5–16.5)
HHB: 1.9 % (ref 0–5)
HHB: 2.2 % (ref 0–5)
HHB: 2.2 % (ref 0–5)
HHB: 4.7 % (ref 0–5)
HYPOCHROMIA: ABNORMAL
LAB: ABNORMAL
LYMPHOCYTES ABSOLUTE: 1.74 E9/L (ref 1.5–4)
LYMPHOCYTES RELATIVE PERCENT: 10.4 % (ref 20–42)
Lab: ABNORMAL
MAGNESIUM: 2.3 MG/DL (ref 1.6–2.6)
MCH RBC QN AUTO: 27.5 PG (ref 26–35)
MCHC RBC AUTO-ENTMCNC: 30.9 % (ref 32–34.5)
MCV RBC AUTO: 89.1 FL (ref 80–99.9)
METAMYELOCYTES RELATIVE PERCENT: 1.7 % (ref 0–1)
METHB: 0.3 % (ref 0–1.5)
METHB: 0.4 % (ref 0–1.5)
MODE: ABNORMAL
MONOCYTES ABSOLUTE: 1.22 E9/L (ref 0.1–0.95)
MONOCYTES RELATIVE PERCENT: 7 % (ref 2–12)
MYELOCYTE PERCENT: 1.7 % (ref 0–0)
NEUTROPHILS ABSOLUTE: 14.44 E9/L (ref 1.8–7.3)
NEUTROPHILS RELATIVE PERCENT: 79.1 % (ref 43–80)
O2 CONTENT: 10.8 ML/DL
O2 SATURATION: 95.2 % (ref 92–98.5)
O2 SATURATION: 97.8 % (ref 92–98.5)
O2 SATURATION: 97.8 % (ref 92–98.5)
O2 SATURATION: 98.1 % (ref 92–98.5)
O2HB: 93.4 % (ref 94–97)
O2HB: 95.9 % (ref 94–97)
O2HB: 96.2 % (ref 94–97)
O2HB: 96.3 % (ref 94–97)
OPERATOR ID: 1874
OPERATOR ID: 1874
OPERATOR ID: 359
OPERATOR ID: ABNORMAL
PATIENT TEMP: 37 C
PCO2: 38.2 MMHG (ref 35–45)
PCO2: 39.3 MMHG (ref 35–45)
PCO2: 42.4 MMHG (ref 35–45)
PCO2: 45.8 MMHG (ref 35–45)
PDW BLD-RTO: 16.2 FL (ref 11.5–15)
PEEP/CPAP: 12 CMH2O
PFO2: 1.94 MMHG/%
PFO2: 2.49 MMHG/%
PFO2: 2.68 MMHG/%
PFO2: 3.01 MMHG/%
PH BLOOD GAS: 7.45 (ref 7.35–7.45)
PH BLOOD GAS: 7.47 (ref 7.35–7.45)
PH BLOOD GAS: 7.49 (ref 7.35–7.45)
PH BLOOD GAS: 7.51 (ref 7.35–7.45)
PHOSPHORUS: 3.3 MG/DL (ref 2.5–4.5)
PLATELET # BLD: 459 E9/L (ref 130–450)
PMV BLD AUTO: 9.1 FL (ref 7–12)
PO2: 107.1 MMHG (ref 60–100)
PO2: 120.4 MMHG (ref 60–100)
PO2: 77.7 MMHG (ref 60–100)
PO2: 99.5 MMHG (ref 60–100)
POLYCHROMASIA: ABNORMAL
POTASSIUM SERPL-SCNC: 3.7 MMOL/L (ref 3.5–5)
PS: 17 CMH20
RBC # BLD: 2.47 E12/L (ref 3.8–5.8)
RI(T): 0.88
RI(T): 1.15
RI(T): 1.98
RI(T): 124 %
SODIUM BLD-SCNC: 133 MMOL/L (ref 132–146)
SOURCE, BLOOD GAS: ABNORMAL
THB: 7.4 G/DL (ref 11.5–16.5)
THB: 7.8 G/DL (ref 11.5–16.5)
THB: 8.2 G/DL (ref 11.5–16.5)
THB: 9.1 G/DL (ref 11.5–16.5)
TIME ANALYZED: 1256
TIME ANALYZED: 1839
TIME ANALYZED: 31
TIME ANALYZED: 642
TOTAL PROTEIN: 6.7 G/DL (ref 6.4–8.3)
WBC # BLD: 17.4 E9/L (ref 4.5–11.5)

## 2019-06-24 PROCEDURE — 36415 COLL VENOUS BLD VENIPUNCTURE: CPT

## 2019-06-24 PROCEDURE — 86850 RBC ANTIBODY SCREEN: CPT

## 2019-06-24 PROCEDURE — 6360000002 HC RX W HCPCS: Performed by: STUDENT IN AN ORGANIZED HEALTH CARE EDUCATION/TRAINING PROGRAM

## 2019-06-24 PROCEDURE — 2580000003 HC RX 258: Performed by: STUDENT IN AN ORGANIZED HEALTH CARE EDUCATION/TRAINING PROGRAM

## 2019-06-24 PROCEDURE — 86901 BLOOD TYPING SEROLOGIC RH(D): CPT

## 2019-06-24 PROCEDURE — 99291 CRITICAL CARE FIRST HOUR: CPT | Performed by: SURGERY

## 2019-06-24 PROCEDURE — 2500000003 HC RX 250 WO HCPCS: Performed by: STUDENT IN AN ORGANIZED HEALTH CARE EDUCATION/TRAINING PROGRAM

## 2019-06-24 PROCEDURE — 6370000000 HC RX 637 (ALT 250 FOR IP): Performed by: STUDENT IN AN ORGANIZED HEALTH CARE EDUCATION/TRAINING PROGRAM

## 2019-06-24 PROCEDURE — 6370000000 HC RX 637 (ALT 250 FOR IP): Performed by: SURGERY

## 2019-06-24 PROCEDURE — 2000000000 HC ICU R&B

## 2019-06-24 PROCEDURE — P9016 RBC LEUKOCYTES REDUCED: HCPCS

## 2019-06-24 PROCEDURE — 80053 COMPREHEN METABOLIC PANEL: CPT

## 2019-06-24 PROCEDURE — 84100 ASSAY OF PHOSPHORUS: CPT

## 2019-06-24 PROCEDURE — 85025 COMPLETE CBC W/AUTO DIFF WBC: CPT

## 2019-06-24 PROCEDURE — 37799 UNLISTED PX VASCULAR SURGERY: CPT

## 2019-06-24 PROCEDURE — 94003 VENT MGMT INPAT SUBQ DAY: CPT

## 2019-06-24 PROCEDURE — 86923 COMPATIBILITY TEST ELECTRIC: CPT

## 2019-06-24 PROCEDURE — 86900 BLOOD TYPING SEROLOGIC ABO: CPT

## 2019-06-24 PROCEDURE — 82805 BLOOD GASES W/O2 SATURATION: CPT

## 2019-06-24 PROCEDURE — 71045 X-RAY EXAM CHEST 1 VIEW: CPT

## 2019-06-24 PROCEDURE — 94640 AIRWAY INHALATION TREATMENT: CPT

## 2019-06-24 PROCEDURE — 83735 ASSAY OF MAGNESIUM: CPT

## 2019-06-24 PROCEDURE — 82330 ASSAY OF CALCIUM: CPT

## 2019-06-24 PROCEDURE — 36430 TRANSFUSION BLD/BLD COMPNT: CPT

## 2019-06-24 RX ORDER — OYSTER SHELL CALCIUM WITH VITAMIN D 500; 200 MG/1; [IU]/1
1 TABLET, FILM COATED ORAL DAILY
Status: DISCONTINUED | OUTPATIENT
Start: 2019-06-24 | End: 2019-06-25

## 2019-06-24 RX ORDER — 0.9 % SODIUM CHLORIDE 0.9 %
250 INTRAVENOUS SOLUTION INTRAVENOUS ONCE
Status: DISCONTINUED | OUTPATIENT
Start: 2019-06-24 | End: 2019-06-25

## 2019-06-24 RX ADMIN — METHOCARBAMOL TABLETS 1000 MG: 500 TABLET, COATED ORAL at 22:25

## 2019-06-24 RX ADMIN — CLONIDINE HYDROCHLORIDE 0.1 MG: 0.1 TABLET ORAL at 13:18

## 2019-06-24 RX ADMIN — OXYCODONE HYDROCHLORIDE 20 MG: 100 SOLUTION ORAL at 20:49

## 2019-06-24 RX ADMIN — ENOXAPARIN SODIUM 40 MG: 40 INJECTION, SOLUTION INTRAVENOUS; SUBCUTANEOUS at 20:05

## 2019-06-24 RX ADMIN — ACETAZOLAMIDE 500 MG: 500 INJECTION, POWDER, LYOPHILIZED, FOR SOLUTION INTRAVENOUS at 08:49

## 2019-06-24 RX ADMIN — LACTULOSE 20 G: 20 SOLUTION ORAL at 17:16

## 2019-06-24 RX ADMIN — CEFAZOLIN 3 G: 10 INJECTION, POWDER, FOR SOLUTION INTRAVENOUS; PARENTERAL at 02:01

## 2019-06-24 RX ADMIN — CEFAZOLIN 3 G: 10 INJECTION, POWDER, FOR SOLUTION INTRAVENOUS; PARENTERAL at 10:46

## 2019-06-24 RX ADMIN — Medication 10 MG: at 20:48

## 2019-06-24 RX ADMIN — OXYCODONE HYDROCHLORIDE 20 MG: 100 SOLUTION ORAL at 15:30

## 2019-06-24 RX ADMIN — CHLORHEXIDINE GLUCONATE 0.12% ORAL RINSE 15 ML: 1.2 LIQUID ORAL at 20:08

## 2019-06-24 RX ADMIN — METHOCARBAMOL TABLETS 1000 MG: 500 TABLET, COATED ORAL at 09:07

## 2019-06-24 RX ADMIN — ACETAZOLAMIDE 500 MG: 500 INJECTION, POWDER, LYOPHILIZED, FOR SOLUTION INTRAVENOUS at 15:31

## 2019-06-24 RX ADMIN — QUETIAPINE FUMARATE 50 MG: 25 TABLET ORAL at 20:48

## 2019-06-24 RX ADMIN — POTASSIUM & SODIUM PHOSPHATES POWDER PACK 280-160-250 MG 500 MG: 280-160-250 PACK at 17:16

## 2019-06-24 RX ADMIN — FUROSEMIDE 15 MG/HR: 10 INJECTION, SOLUTION INTRAMUSCULAR; INTRAVENOUS at 05:47

## 2019-06-24 RX ADMIN — Medication 10 ML: at 08:52

## 2019-06-24 RX ADMIN — CLONIDINE HYDROCHLORIDE 0.1 MG: 0.1 TABLET ORAL at 06:07

## 2019-06-24 RX ADMIN — ACETAMINOPHEN ORAL SOLUTION 650 MG: 650 SOLUTION ORAL at 09:09

## 2019-06-24 RX ADMIN — BACITRACIN ZINC: 500 OINTMENT TOPICAL at 13:19

## 2019-06-24 RX ADMIN — MICONAZOLE NITRATE: 20.6 POWDER TOPICAL at 20:47

## 2019-06-24 RX ADMIN — SENNOSIDES 10 ML: 8.8 SYRUP ORAL at 20:48

## 2019-06-24 RX ADMIN — CALCIUM CARBONATE-VITAMIN D TAB 500 MG-200 UNIT 1 TABLET: 500-200 TAB at 08:50

## 2019-06-24 RX ADMIN — METHOCARBAMOL TABLETS 1000 MG: 500 TABLET, COATED ORAL at 15:33

## 2019-06-24 RX ADMIN — IPRATROPIUM BROMIDE AND ALBUTEROL SULFATE 1 AMPULE: .5; 3 SOLUTION RESPIRATORY (INHALATION) at 11:30

## 2019-06-24 RX ADMIN — DOCUSATE SODIUM 100 MG: 50 LIQUID ORAL at 20:04

## 2019-06-24 RX ADMIN — PANTOPRAZOLE SODIUM 40 MG: 40 GRANULE, DELAYED RELEASE ORAL at 06:07

## 2019-06-24 RX ADMIN — MICONAZOLE NITRATE: 20.6 POWDER TOPICAL at 08:52

## 2019-06-24 RX ADMIN — POTASSIUM & SODIUM PHOSPHATES POWDER PACK 280-160-250 MG 500 MG: 280-160-250 PACK at 08:57

## 2019-06-24 RX ADMIN — BACITRACIN ZINC: 500 OINTMENT TOPICAL at 20:05

## 2019-06-24 RX ADMIN — LACTULOSE 20 G: 20 SOLUTION ORAL at 00:25

## 2019-06-24 RX ADMIN — ACETAMINOPHEN ORAL SOLUTION 650 MG: 650 SOLUTION ORAL at 02:36

## 2019-06-24 RX ADMIN — IPRATROPIUM BROMIDE AND ALBUTEROL SULFATE 1 AMPULE: .5; 3 SOLUTION RESPIRATORY (INHALATION) at 17:00

## 2019-06-24 RX ADMIN — Medication 10 ML: at 20:05

## 2019-06-24 RX ADMIN — ENOXAPARIN SODIUM 40 MG: 40 INJECTION, SOLUTION INTRAVENOUS; SUBCUTANEOUS at 08:51

## 2019-06-24 RX ADMIN — CEFAZOLIN 3 G: 10 INJECTION, POWDER, FOR SOLUTION INTRAVENOUS; PARENTERAL at 17:14

## 2019-06-24 RX ADMIN — CHLORHEXIDINE GLUCONATE 0.12% ORAL RINSE 15 ML: 1.2 LIQUID ORAL at 08:50

## 2019-06-24 RX ADMIN — OXYCODONE HYDROCHLORIDE 20 MG: 100 SOLUTION ORAL at 08:51

## 2019-06-24 RX ADMIN — OXYCODONE HYDROCHLORIDE 20 MG: 100 SOLUTION ORAL at 02:37

## 2019-06-24 RX ADMIN — LACTULOSE 20 G: 20 SOLUTION ORAL at 06:07

## 2019-06-24 RX ADMIN — HEPARIN 300 UNITS: 100 SYRINGE at 20:04

## 2019-06-24 RX ADMIN — IPRATROPIUM BROMIDE AND ALBUTEROL SULFATE 1 AMPULE: .5; 3 SOLUTION RESPIRATORY (INHALATION) at 21:20

## 2019-06-24 RX ADMIN — CLONIDINE HYDROCHLORIDE 0.1 MG: 0.1 TABLET ORAL at 22:25

## 2019-06-24 RX ADMIN — BACITRACIN ZINC: 500 OINTMENT TOPICAL at 08:49

## 2019-06-24 RX ADMIN — IPRATROPIUM BROMIDE AND ALBUTEROL SULFATE 1 AMPULE: .5; 3 SOLUTION RESPIRATORY (INHALATION) at 08:00

## 2019-06-24 RX ADMIN — DOCUSATE SODIUM 100 MG: 50 LIQUID ORAL at 08:50

## 2019-06-24 RX ADMIN — POTASSIUM & SODIUM PHOSPHATES POWDER PACK 280-160-250 MG 500 MG: 280-160-250 PACK at 20:47

## 2019-06-24 RX ADMIN — METHOCARBAMOL TABLETS 1000 MG: 500 TABLET, COATED ORAL at 04:41

## 2019-06-24 RX ADMIN — FLUCONAZOLE 200 MG: 40 POWDER, FOR SUSPENSION ORAL at 09:00

## 2019-06-24 RX ADMIN — HEPARIN 300 UNITS: 100 SYRINGE at 08:50

## 2019-06-24 RX ADMIN — POTASSIUM & SODIUM PHOSPHATES POWDER PACK 280-160-250 MG 500 MG: 280-160-250 PACK at 13:19

## 2019-06-24 ASSESSMENT — PULMONARY FUNCTION TESTS
PIF_VALUE: 29
PIF_VALUE: 30
PIF_VALUE: 30
PIF_VALUE: 29
PIF_VALUE: 29
PIF_VALUE: 30
PIF_VALUE: 29
PIF_VALUE: 29
PIF_VALUE: 32
PIF_VALUE: 30
PIF_VALUE: 29
PIF_VALUE: 29
PIF_VALUE: 30
PIF_VALUE: 30
PIF_VALUE: 29
PIF_VALUE: 29
PIF_VALUE: 30
PIF_VALUE: 30
PIF_VALUE: 29
PIF_VALUE: 30
PIF_VALUE: 29
PIF_VALUE: 30
PIF_VALUE: 27
PIF_VALUE: 30

## 2019-06-24 ASSESSMENT — PAIN SCALES - GENERAL
PAINLEVEL_OUTOF10: 0

## 2019-06-24 NOTE — CARE COORDINATION
ADELINE Discharge planning:    SW met with patient, patients mom and girlfriend. SW followed up with LTAC choice and patients mom stated that they have no yet decided on an LTAC. Patient to have a trach and peg placed tomorrow. ADELINE will continue to follow.  Maru Potter

## 2019-06-24 NOTE — PLAN OF CARE
Problem: Falls - Risk of:  Goal: Will remain free from falls  Description  Will remain free from falls  Outcome: Met This Shift     Problem: Risk for Impaired Skin Integrity  Goal: Tissue integrity - skin and mucous membranes  Description  Structural intactness and normal physiological function of skin and  mucous membranes.   Outcome: Met This Shift     Problem: Musculor/Skeletal Functional Status  Goal: Absence of falls  Outcome: Met This Shift     Problem: Pain:  Goal: Pain level will decrease  Description  Pain level will decrease  Outcome: Met This Shift     Problem: Airway Clearance - Ineffective:  Goal: Ability to maintain a clear airway will improve  Description  Ability to maintain a clear airway will improve  Outcome: Met This Shift
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
## 2019-06-25 ENCOUNTER — ANESTHESIA (OUTPATIENT)
Dept: OPERATING ROOM | Age: 24
DRG: 003 | End: 2019-06-25
Payer: OTHER MISCELLANEOUS

## 2019-06-25 ENCOUNTER — APPOINTMENT (OUTPATIENT)
Dept: GENERAL RADIOLOGY | Age: 24
DRG: 003 | End: 2019-06-25
Payer: OTHER MISCELLANEOUS

## 2019-06-25 VITALS
SYSTOLIC BLOOD PRESSURE: 156 MMHG | OXYGEN SATURATION: 95 % | RESPIRATION RATE: 20 BRPM | DIASTOLIC BLOOD PRESSURE: 105 MMHG

## 2019-06-25 LAB
AADO2: 134.2 MMHG
AADO2: 150.6 MMHG
ALBUMIN SERPL-MCNC: 3 G/DL (ref 3.5–5.2)
ALP BLD-CCNC: 178 U/L (ref 40–129)
ALT SERPL-CCNC: 47 U/L (ref 0–40)
ANION GAP SERPL CALCULATED.3IONS-SCNC: 11 MMOL/L (ref 7–16)
ANISOCYTOSIS: ABNORMAL
AST SERPL-CCNC: 38 U/L (ref 0–39)
B.E.: 0.2 MMOL/L (ref -3–3)
B.E.: 2.4 MMOL/L (ref -3–3)
BASOPHILS ABSOLUTE: 0 E9/L (ref 0–0.2)
BASOPHILS RELATIVE PERCENT: 0.6 % (ref 0–2)
BILIRUB SERPL-MCNC: 1.1 MG/DL (ref 0–1.2)
BUN BLDV-MCNC: 16 MG/DL (ref 6–20)
CALCIUM IONIZED: 1.23 MMOL/L (ref 1.15–1.33)
CALCIUM SERPL-MCNC: 8.6 MG/DL (ref 8.6–10.2)
CHLORIDE BLD-SCNC: 97 MMOL/L (ref 98–107)
CO2: 25 MMOL/L (ref 22–29)
COHB: 0.7 % (ref 0–1.5)
COHB: 1.1 % (ref 0–1.5)
CREAT SERPL-MCNC: 0.6 MG/DL (ref 0.7–1.2)
CRITICAL: ABNORMAL
CRITICAL: NORMAL
CULTURE, RESPIRATORY: NORMAL
DATE ANALYZED: ABNORMAL
DATE ANALYZED: NORMAL
DATE OF COLLECTION: ABNORMAL
DATE OF COLLECTION: NORMAL
EOSINOPHILS ABSOLUTE: 0 E9/L (ref 0.05–0.5)
EOSINOPHILS RELATIVE PERCENT: 1.8 % (ref 0–6)
FIO2: 40 %
FIO2: 40 %
GFR AFRICAN AMERICAN: >60
GFR NON-AFRICAN AMERICAN: >60 ML/MIN/1.73
GLUCOSE BLD-MCNC: 103 MG/DL (ref 74–99)
HCO3: 24.5 MMOL/L (ref 22–26)
HCO3: 27.3 MMOL/L (ref 22–26)
HCT VFR BLD CALC: 25.5 % (ref 37–54)
HEMOGLOBIN: 7.9 G/DL (ref 12.5–16.5)
HHB: 3.2 % (ref 0–5)
HHB: 4.2 % (ref 0–5)
LAB: ABNORMAL
LAB: NORMAL
LYMPHOCYTES ABSOLUTE: 0.54 E9/L (ref 1.5–4)
LYMPHOCYTES RELATIVE PERCENT: 2.6 % (ref 20–42)
Lab: ABNORMAL
Lab: NORMAL
MAGNESIUM: 2.5 MG/DL (ref 1.6–2.6)
MCH RBC QN AUTO: 27.8 PG (ref 26–35)
MCHC RBC AUTO-ENTMCNC: 31 % (ref 32–34.5)
MCV RBC AUTO: 89.8 FL (ref 80–99.9)
METAMYELOCYTES RELATIVE PERCENT: 2.6 % (ref 0–1)
METHB: 0.4 % (ref 0–1.5)
METHB: 0.5 % (ref 0–1.5)
MODE: ABNORMAL
MODE: NORMAL
MONOCYTES ABSOLUTE: 1.97 E9/L (ref 0.1–0.95)
MONOCYTES RELATIVE PERCENT: 10.5 % (ref 2–12)
MYELOCYTE PERCENT: 1.8 % (ref 0–0)
NEUTROPHILS ABSOLUTE: 15.57 E9/L (ref 1.8–7.3)
NEUTROPHILS RELATIVE PERCENT: 82.5 % (ref 43–80)
O2 CONTENT: 12 ML/DL
O2 CONTENT: 16.1 ML/DL
O2 SATURATION: 95.7 % (ref 92–98.5)
O2 SATURATION: 96.8 % (ref 92–98.5)
O2HB: 94.3 % (ref 94–97)
O2HB: 95.6 % (ref 94–97)
OPERATOR ID: ABNORMAL
OPERATOR ID: NORMAL
PATIENT TEMP: 37 C
PATIENT TEMP: 37 C
PCO2: 38.6 MMHG (ref 35–45)
PCO2: 43.8 MMHG (ref 35–45)
PDW BLD-RTO: 16.4 FL (ref 11.5–15)
PEEP/CPAP: 12 CMH2O
PEEP/CPAP: 12 CMH2O
PFO2: 2 MMHG/%
PFO2: 2.26 MMHG/%
PH BLOOD GAS: 7.41 (ref 7.35–7.45)
PH BLOOD GAS: 7.42 (ref 7.35–7.45)
PHOSPHORUS: 4.1 MG/DL (ref 2.5–4.5)
PLATELET # BLD: 536 E9/L (ref 130–450)
PMV BLD AUTO: 9 FL (ref 7–12)
PO2: 80.2 MMHG (ref 60–100)
PO2: 90.6 MMHG (ref 60–100)
POLYCHROMASIA: ABNORMAL
POTASSIUM SERPL-SCNC: 4.2 MMOL/L (ref 3.5–5)
PS: 14 CMH20
PS: 17 CMH20
RBC # BLD: 2.84 E12/L (ref 3.8–5.8)
RI(T): 148 %
RI(T): 188 %
SMEAR, RESPIRATORY: NORMAL
SODIUM BLD-SCNC: 133 MMOL/L (ref 132–146)
SOURCE, BLOOD GAS: ABNORMAL
SOURCE, BLOOD GAS: NORMAL
THB: 12.1 G/DL (ref 11.5–16.5)
THB: 8.8 G/DL (ref 11.5–16.5)
TIME ANALYZED: 27
TIME ANALYZED: 549
TOTAL PROTEIN: 7.2 G/DL (ref 6.4–8.3)
WBC # BLD: 17.9 E9/L (ref 4.5–11.5)

## 2019-06-25 PROCEDURE — 6360000002 HC RX W HCPCS: Performed by: STUDENT IN AN ORGANIZED HEALTH CARE EDUCATION/TRAINING PROGRAM

## 2019-06-25 PROCEDURE — 97110 THERAPEUTIC EXERCISES: CPT

## 2019-06-25 PROCEDURE — 31600 PLANNED TRACHEOSTOMY: CPT | Performed by: SURGERY

## 2019-06-25 PROCEDURE — 0DH63UZ INSERTION OF FEEDING DEVICE INTO STOMACH, PERCUTANEOUS APPROACH: ICD-10-PCS | Performed by: SURGERY

## 2019-06-25 PROCEDURE — 37799 UNLISTED PX VASCULAR SURGERY: CPT

## 2019-06-25 PROCEDURE — 3700000001 HC ADD 15 MINUTES (ANESTHESIA): Performed by: SURGERY

## 2019-06-25 PROCEDURE — 6370000000 HC RX 637 (ALT 250 FOR IP): Performed by: STUDENT IN AN ORGANIZED HEALTH CARE EDUCATION/TRAINING PROGRAM

## 2019-06-25 PROCEDURE — 2500000003 HC RX 250 WO HCPCS: Performed by: NURSE ANESTHETIST, CERTIFIED REGISTERED

## 2019-06-25 PROCEDURE — 6360000002 HC RX W HCPCS

## 2019-06-25 PROCEDURE — 2580000003 HC RX 258: Performed by: STUDENT IN AN ORGANIZED HEALTH CARE EDUCATION/TRAINING PROGRAM

## 2019-06-25 PROCEDURE — 7100000000 HC PACU RECOVERY - FIRST 15 MIN

## 2019-06-25 PROCEDURE — 7100000001 HC PACU RECOVERY - ADDTL 15 MIN

## 2019-06-25 PROCEDURE — 2709999900 HC NON-CHARGEABLE SUPPLY: Performed by: SURGERY

## 2019-06-25 PROCEDURE — 84100 ASSAY OF PHOSPHORUS: CPT

## 2019-06-25 PROCEDURE — 94003 VENT MGMT INPAT SUBQ DAY: CPT

## 2019-06-25 PROCEDURE — 31502 CHANGE OF WINDPIPE AIRWAY: CPT

## 2019-06-25 PROCEDURE — 94640 AIRWAY INHALATION TREATMENT: CPT

## 2019-06-25 PROCEDURE — 36415 COLL VENOUS BLD VENIPUNCTURE: CPT

## 2019-06-25 PROCEDURE — 43762 RPLC GTUBE NO REVJ TRC: CPT

## 2019-06-25 PROCEDURE — 82330 ASSAY OF CALCIUM: CPT

## 2019-06-25 PROCEDURE — 3600000013 HC SURGERY LEVEL 3 ADDTL 15MIN: Performed by: SURGERY

## 2019-06-25 PROCEDURE — 2580000003 HC RX 258: Performed by: NURSE ANESTHETIST, CERTIFIED REGISTERED

## 2019-06-25 PROCEDURE — 3600000003 HC SURGERY LEVEL 3 BASE: Performed by: SURGERY

## 2019-06-25 PROCEDURE — 82805 BLOOD GASES W/O2 SATURATION: CPT

## 2019-06-25 PROCEDURE — 6370000000 HC RX 637 (ALT 250 FOR IP): Performed by: SURGERY

## 2019-06-25 PROCEDURE — 80053 COMPREHEN METABOLIC PANEL: CPT

## 2019-06-25 PROCEDURE — 99291 CRITICAL CARE FIRST HOUR: CPT | Performed by: SURGERY

## 2019-06-25 PROCEDURE — 0B110F4 BYPASS TRACHEA TO CUTANEOUS WITH TRACHEOSTOMY DEVICE, OPEN APPROACH: ICD-10-PCS | Performed by: SURGERY

## 2019-06-25 PROCEDURE — 71045 X-RAY EXAM CHEST 1 VIEW: CPT

## 2019-06-25 PROCEDURE — 85025 COMPLETE CBC W/AUTO DIFF WBC: CPT

## 2019-06-25 PROCEDURE — 3700000000 HC ANESTHESIA ATTENDED CARE: Performed by: SURGERY

## 2019-06-25 PROCEDURE — 2000000000 HC ICU R&B

## 2019-06-25 PROCEDURE — 43246 EGD PLACE GASTROSTOMY TUBE: CPT | Performed by: SURGERY

## 2019-06-25 PROCEDURE — 6360000002 HC RX W HCPCS: Performed by: NURSE ANESTHETIST, CERTIFIED REGISTERED

## 2019-06-25 PROCEDURE — 83735 ASSAY OF MAGNESIUM: CPT

## 2019-06-25 RX ORDER — MIDAZOLAM HYDROCHLORIDE 1 MG/ML
INJECTION INTRAMUSCULAR; INTRAVENOUS PRN
Status: DISCONTINUED | OUTPATIENT
Start: 2019-06-25 | End: 2019-06-25 | Stop reason: SDUPTHER

## 2019-06-25 RX ORDER — FENTANYL CITRATE 50 UG/ML
50 INJECTION, SOLUTION INTRAMUSCULAR; INTRAVENOUS
Status: COMPLETED | OUTPATIENT
Start: 2019-06-25 | End: 2019-06-25

## 2019-06-25 RX ORDER — VECURONIUM BROMIDE 1 MG/ML
INJECTION, POWDER, LYOPHILIZED, FOR SOLUTION INTRAVENOUS PRN
Status: DISCONTINUED | OUTPATIENT
Start: 2019-06-25 | End: 2019-06-25 | Stop reason: SDUPTHER

## 2019-06-25 RX ORDER — SODIUM CHLORIDE 9 MG/ML
INJECTION, SOLUTION INTRAVENOUS CONTINUOUS PRN
Status: DISCONTINUED | OUTPATIENT
Start: 2019-06-25 | End: 2019-06-25 | Stop reason: SDUPTHER

## 2019-06-25 RX ORDER — ROCURONIUM BROMIDE 10 MG/ML
INJECTION, SOLUTION INTRAVENOUS PRN
Status: DISCONTINUED | OUTPATIENT
Start: 2019-06-25 | End: 2019-06-25 | Stop reason: SDUPTHER

## 2019-06-25 RX ORDER — FENTANYL CITRATE 50 UG/ML
INJECTION, SOLUTION INTRAMUSCULAR; INTRAVENOUS
Status: COMPLETED
Start: 2019-06-25 | End: 2019-06-25

## 2019-06-25 RX ORDER — FENTANYL CITRATE 50 UG/ML
INJECTION, SOLUTION INTRAMUSCULAR; INTRAVENOUS PRN
Status: DISCONTINUED | OUTPATIENT
Start: 2019-06-25 | End: 2019-06-25 | Stop reason: SDUPTHER

## 2019-06-25 RX ADMIN — FENTANYL CITRATE 50 MCG: 50 INJECTION INTRAMUSCULAR; INTRAVENOUS at 14:49

## 2019-06-25 RX ADMIN — CEFAZOLIN 3 G: 10 INJECTION, POWDER, FOR SOLUTION INTRAVENOUS; PARENTERAL at 18:41

## 2019-06-25 RX ADMIN — DOCUSATE SODIUM 100 MG: 50 LIQUID ORAL at 21:37

## 2019-06-25 RX ADMIN — CLONIDINE HYDROCHLORIDE 0.1 MG: 0.1 TABLET ORAL at 21:37

## 2019-06-25 RX ADMIN — IPRATROPIUM BROMIDE AND ALBUTEROL SULFATE 1 AMPULE: .5; 3 SOLUTION RESPIRATORY (INHALATION) at 19:19

## 2019-06-25 RX ADMIN — VECURONIUM BROMIDE FOR INJECTION 4 MG: 1 INJECTION, POWDER, LYOPHILIZED, FOR SOLUTION INTRAVENOUS at 09:13

## 2019-06-25 RX ADMIN — Medication 10 ML: at 21:39

## 2019-06-25 RX ADMIN — IPRATROPIUM BROMIDE AND ALBUTEROL SULFATE 1 AMPULE: .5; 3 SOLUTION RESPIRATORY (INHALATION) at 11:20

## 2019-06-25 RX ADMIN — BACITRACIN ZINC: 500 OINTMENT TOPICAL at 20:00

## 2019-06-25 RX ADMIN — SENNOSIDES 10 ML: 8.8 SYRUP ORAL at 21:41

## 2019-06-25 RX ADMIN — METHOCARBAMOL TABLETS 1000 MG: 500 TABLET, COATED ORAL at 16:53

## 2019-06-25 RX ADMIN — METHOCARBAMOL TABLETS 1000 MG: 500 TABLET, COATED ORAL at 21:37

## 2019-06-25 RX ADMIN — CLONIDINE HYDROCHLORIDE 0.1 MG: 0.1 TABLET ORAL at 06:04

## 2019-06-25 RX ADMIN — VECURONIUM BROMIDE FOR INJECTION 2 MG: 1 INJECTION, POWDER, LYOPHILIZED, FOR SOLUTION INTRAVENOUS at 09:38

## 2019-06-25 RX ADMIN — FENTANYL CITRATE 50 MCG: 50 INJECTION INTRAMUSCULAR; INTRAVENOUS at 10:49

## 2019-06-25 RX ADMIN — ENOXAPARIN SODIUM 40 MG: 40 INJECTION, SOLUTION INTRAVENOUS; SUBCUTANEOUS at 21:37

## 2019-06-25 RX ADMIN — VECURONIUM BROMIDE FOR INJECTION 2 MG: 1 INJECTION, POWDER, LYOPHILIZED, FOR SOLUTION INTRAVENOUS at 10:00

## 2019-06-25 RX ADMIN — ROCURONIUM BROMIDE 30 MG: 10 INJECTION, SOLUTION INTRAVENOUS at 08:55

## 2019-06-25 RX ADMIN — ROCURONIUM BROMIDE 20 MG: 10 INJECTION, SOLUTION INTRAVENOUS at 08:50

## 2019-06-25 RX ADMIN — ACETAZOLAMIDE 500 MG: 500 INJECTION, POWDER, LYOPHILIZED, FOR SOLUTION INTRAVENOUS at 07:32

## 2019-06-25 RX ADMIN — OXYCODONE HYDROCHLORIDE 20 MG: 100 SOLUTION ORAL at 21:39

## 2019-06-25 RX ADMIN — LACTULOSE 20 G: 20 SOLUTION ORAL at 23:55

## 2019-06-25 RX ADMIN — Medication 10 ML: at 12:06

## 2019-06-25 RX ADMIN — VECURONIUM BROMIDE FOR INJECTION 2 MG: 1 INJECTION, POWDER, LYOPHILIZED, FOR SOLUTION INTRAVENOUS at 09:58

## 2019-06-25 RX ADMIN — HEPARIN 300 UNITS: 100 SYRINGE at 21:39

## 2019-06-25 RX ADMIN — QUETIAPINE FUMARATE 50 MG: 25 TABLET ORAL at 21:37

## 2019-06-25 RX ADMIN — MIDAZOLAM HYDROCHLORIDE 2 MG: 1 INJECTION, SOLUTION INTRAMUSCULAR; INTRAVENOUS at 08:40

## 2019-06-25 RX ADMIN — IPRATROPIUM BROMIDE AND ALBUTEROL SULFATE 1 AMPULE: .5; 3 SOLUTION RESPIRATORY (INHALATION) at 07:30

## 2019-06-25 RX ADMIN — LACTULOSE 20 G: 20 SOLUTION ORAL at 18:40

## 2019-06-25 RX ADMIN — METHOCARBAMOL TABLETS 1000 MG: 500 TABLET, COATED ORAL at 03:05

## 2019-06-25 RX ADMIN — IPRATROPIUM BROMIDE AND ALBUTEROL SULFATE 1 AMPULE: .5; 3 SOLUTION RESPIRATORY (INHALATION) at 15:40

## 2019-06-25 RX ADMIN — CEFAZOLIN 3 G: 10 INJECTION, POWDER, FOR SOLUTION INTRAVENOUS; PARENTERAL at 02:10

## 2019-06-25 RX ADMIN — FENTANYL CITRATE 50 MCG: 50 INJECTION INTRAMUSCULAR; INTRAVENOUS at 12:07

## 2019-06-25 RX ADMIN — BACITRACIN ZINC: 500 OINTMENT TOPICAL at 14:50

## 2019-06-25 RX ADMIN — ACETAMINOPHEN ORAL SOLUTION 650 MG: 650 SOLUTION ORAL at 16:53

## 2019-06-25 RX ADMIN — CEFAZOLIN 3 G: 10 INJECTION, POWDER, FOR SOLUTION INTRAVENOUS; PARENTERAL at 12:07

## 2019-06-25 RX ADMIN — Medication 10 MG: at 21:37

## 2019-06-25 RX ADMIN — FENTANYL CITRATE 50 MCG: 50 INJECTION, SOLUTION INTRAMUSCULAR; INTRAVENOUS at 09:15

## 2019-06-25 RX ADMIN — OXYCODONE HYDROCHLORIDE 20 MG: 100 SOLUTION ORAL at 15:50

## 2019-06-25 RX ADMIN — SODIUM CHLORIDE: 9 INJECTION, SOLUTION INTRAVENOUS at 08:40

## 2019-06-25 RX ADMIN — CHLORHEXIDINE GLUCONATE 0.12% ORAL RINSE 15 ML: 1.2 LIQUID ORAL at 21:38

## 2019-06-25 RX ADMIN — ACETAZOLAMIDE 500 MG: 500 INJECTION, POWDER, LYOPHILIZED, FOR SOLUTION INTRAVENOUS at 00:12

## 2019-06-25 RX ADMIN — OXYCODONE HYDROCHLORIDE 20 MG: 100 SOLUTION ORAL at 03:04

## 2019-06-25 RX ADMIN — FENTANYL CITRATE 50 MCG: 50 INJECTION, SOLUTION INTRAMUSCULAR; INTRAVENOUS at 08:54

## 2019-06-25 RX ADMIN — PANTOPRAZOLE SODIUM 40 MG: 40 GRANULE, DELAYED RELEASE ORAL at 06:04

## 2019-06-25 RX ADMIN — MICONAZOLE NITRATE: 20.6 POWDER TOPICAL at 20:00

## 2019-06-25 ASSESSMENT — PULMONARY FUNCTION TESTS
PIF_VALUE: 36
PIF_VALUE: 22
PIF_VALUE: 43
PIF_VALUE: 37
PIF_VALUE: 40
PIF_VALUE: 39
PIF_VALUE: 34
PIF_VALUE: 37
PIF_VALUE: 46
PIF_VALUE: 12
PIF_VALUE: 47
PIF_VALUE: 28
PIF_VALUE: 40
PIF_VALUE: 38
PIF_VALUE: 37
PIF_VALUE: 30
PIF_VALUE: 28
PIF_VALUE: 21
PIF_VALUE: 31
PIF_VALUE: 29
PIF_VALUE: 37
PIF_VALUE: 27
PIF_VALUE: 29
PIF_VALUE: 39
PIF_VALUE: 36
PIF_VALUE: 30
PIF_VALUE: 29
PIF_VALUE: 40
PIF_VALUE: 36
PIF_VALUE: 37
PIF_VALUE: 38
PIF_VALUE: 37
PIF_VALUE: 38
PIF_VALUE: 47
PIF_VALUE: 30
PIF_VALUE: 30
PIF_VALUE: 36
PIF_VALUE: 40
PIF_VALUE: 3
PIF_VALUE: 28
PIF_VALUE: 39
PIF_VALUE: 26
PIF_VALUE: 36
PIF_VALUE: 15
PIF_VALUE: 38
PIF_VALUE: 36
PIF_VALUE: 35
PIF_VALUE: 36
PIF_VALUE: 37
PIF_VALUE: 38
PIF_VALUE: 38
PIF_VALUE: 36
PIF_VALUE: 22
PIF_VALUE: 38
PIF_VALUE: 36
PIF_VALUE: 37
PIF_VALUE: 28
PIF_VALUE: 40
PIF_VALUE: 40
PIF_VALUE: 36
PIF_VALUE: 37
PIF_VALUE: 36
PIF_VALUE: 38
PIF_VALUE: 35
PIF_VALUE: 37
PIF_VALUE: 37
PIF_VALUE: 30
PIF_VALUE: 37
PIF_VALUE: 38
PIF_VALUE: 37
PIF_VALUE: 41
PIF_VALUE: 28
PIF_VALUE: 39
PIF_VALUE: 38
PIF_VALUE: 23
PIF_VALUE: 37
PIF_VALUE: 26
PIF_VALUE: 37
PIF_VALUE: 42
PIF_VALUE: 27
PIF_VALUE: 37
PIF_VALUE: 30
PIF_VALUE: 41
PIF_VALUE: 31
PIF_VALUE: 37
PIF_VALUE: 37
PIF_VALUE: 36
PIF_VALUE: 41
PIF_VALUE: 36
PIF_VALUE: 38
PIF_VALUE: 46
PIF_VALUE: 38
PIF_VALUE: 29
PIF_VALUE: 35
PIF_VALUE: 36
PIF_VALUE: 41
PIF_VALUE: 35
PIF_VALUE: 38
PIF_VALUE: 35
PIF_VALUE: 38
PIF_VALUE: 36
PIF_VALUE: 43
PIF_VALUE: 42
PIF_VALUE: 38
PIF_VALUE: 30
PIF_VALUE: 36
PIF_VALUE: 37
PIF_VALUE: 38
PIF_VALUE: 21
PIF_VALUE: 37

## 2019-06-25 ASSESSMENT — PAIN SCALES - GENERAL
PAINLEVEL_OUTOF10: 0
PAINLEVEL_OUTOF10: 4
PAINLEVEL_OUTOF10: 0
PAINLEVEL_OUTOF10: 7
PAINLEVEL_OUTOF10: 0
PAINLEVEL_OUTOF10: 7
PAINLEVEL_OUTOF10: 0
PAINLEVEL_OUTOF10: 0
PAINLEVEL_OUTOF10: 4

## 2019-06-25 ASSESSMENT — PAIN DESCRIPTION - ORIENTATION
ORIENTATION: MID
ORIENTATION: MID
ORIENTATION: ANTERIOR
ORIENTATION: ANTERIOR

## 2019-06-25 ASSESSMENT — PAIN DESCRIPTION - FREQUENCY
FREQUENCY: INTERMITTENT
FREQUENCY: INTERMITTENT

## 2019-06-25 ASSESSMENT — PAIN DESCRIPTION - LOCATION
LOCATION: NECK;ABDOMEN
LOCATION: NECK
LOCATION: NECK
LOCATION: ABDOMEN;NECK

## 2019-06-25 ASSESSMENT — LIFESTYLE VARIABLES: SMOKING_STATUS: 1

## 2019-06-25 ASSESSMENT — PAIN DESCRIPTION - ONSET: ONSET: ON-GOING

## 2019-06-25 ASSESSMENT — PAIN DESCRIPTION - DESCRIPTORS
DESCRIPTORS: DISCOMFORT
DESCRIPTORS: ACHING
DESCRIPTORS: ACHING
DESCRIPTORS: DISCOMFORT

## 2019-06-25 ASSESSMENT — PAIN DESCRIPTION - PAIN TYPE
TYPE: SURGICAL PAIN
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
## 2019-06-25 NOTE — PROGRESS NOTES
Patient education  Pt educated on safety, positioning, and continued POC    Patient response to education:   Pt verbalized understanding Pt demonstrated skill Pt requires further education in this area   Yes - head nod No  Yes      Assessment/Comments  ---Pt received supine in bed and was agreeable to session with OT collaboration. RN reported pt stable for participation prior to session. Pt repositioned in bed to complete therex. Continues to continue to c/o pain in neck and throat from vent tube. Reported slightly easier breathing since trach. Completed BLE therex SAQs, APs, manual resisted leg press, and PROM. Scooting to [facility] completed and positioned with pillows for comfort.     PLAN  --continue POC    Time in  1350  Time out  [address], PT, DPT  [id number]

## 2019-06-25 NOTE — PLAN OF CARE
Problem: Falls - Risk of:  Goal: Will remain free from falls  Description  Will remain free from falls  Outcome: Met This Shift     Problem: Falls - Risk of:  Goal: Absence of physical injury  Description  Absence of physical injury  Outcome: Met This Shift     Problem: Risk for Impaired Skin Integrity  Goal: Tissue integrity - skin and mucous membranes  Description  Structural intactness and normal physiological function of skin and  mucous membranes.   Outcome: Met This Shift     Problem: Musculor/Skeletal Functional Status  Goal: Absence of falls  Outcome: Met This Shift     Problem: Pain:  Goal: Control of acute pain  Description  Control of acute pain  Outcome: Met This Shift
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
## 2019-06-25 NOTE — PROGRESS NOTES
Obturator and lubricant placed in patient labeled bag, placed in patients chart, and sent with patient to [facility]

## 2019-06-25 NOTE — PLAN OF CARE
Problem: Falls - Risk of:  Goal: Will remain free from falls  Description  Will remain free from falls  6/25/2019 1014 by Addi Peterson RN  Outcome: Met This Shift  6/25/2019 0026 by Amairani Kemp RN  Outcome: Met This Shift  Goal: Absence of physical injury  Description  Absence of physical injury  6/25/2019 1014 by Addi Peterson RN  Outcome: Met This Shift  6/25/2019 0026 by Amairani Kemp RN  Outcome: Met This Shift     Problem: Risk for Impaired Skin Integrity  Goal: Tissue integrity - skin and mucous membranes  Description  Structural intactness and normal physiological function of skin and  mucous membranes.   6/25/2019 1014 by Addi Peterson RN  Outcome: Met This Shift  6/25/2019 0026 by Amairani Kemp RN  Outcome: Met This Shift     Problem: Musculor/Skeletal Functional Status  Goal: Absence of falls  6/25/2019 0026 by Amairani Kemp RN  Outcome: Met This Shift     Problem: Pain:  Goal: Pain level will decrease  Description  Pain level will decrease  Outcome: Met This Shift  Goal: Control of acute pain  Description  Control of acute pain  6/25/2019 1014 by Addi Peterson RN  Outcome: Met This Shift  6/25/2019 0026 by Amairani Kemp RN  Outcome: Met This Shift     Problem: Airway Clearance - Ineffective:  Goal: Ability to maintain a clear airway will improve  Description  Ability to maintain a clear airway will improve  Outcome: Met This Shift
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
## 2019-06-25 NOTE — ANESTHESIA POSTPROCEDURE EVALUATION
Department of Anesthesiology  Postprocedure Note    Patient: Goldie Bumpers  MRN: [id number]  YOB: 1995  Date of evaluation: 6/25/2019  Time:  10:53 AM     Procedure Summary     Date:  06/25/19 Room / Location:  [facility] OR 05 / [facility] OR    Anesthesia Start:  Reed Morales Anesthesia Stop:  1027    Procedures:       OPEN TRACHEOTOMY (N/A Neck)      EGD ESOPHAGOGASTRODUODENOSCOPY PEG TUBE INSERTION (N/A Abdomen) Diagnosis:  (.)    Surgeon:  Adam Sarah MD Responsible Provider:  Sanchez Hui DO    Anesthesia Type:  general ASA Status:  4          Anesthesia Type: general    Nathaniel Phase I:      Nathaniel Phase II:      Last vitals: Reviewed and per EMR flowsheets.        Anesthesia Post Evaluation    Patient location during evaluation: ICU  Patient participation: waiting for patient participation  Level of consciousness: sedated and ventilated  Nausea & Vomiting: no nausea and no vomiting  Complications: no  Cardiovascular status: hemodynamically stable  Respiratory status: ventilator (Tracheostomy)  Hydration status: stable

## 2019-06-25 NOTE — CARE COORDINATION
SOCIAL WORK/CASEMANAGEMENT TRANSITION OF CARE PLANNING: met with the mother in the waiting room. Went over each ltac and their location. She wants to discuss it with pt's girlfriend first before making a decision. She thinks as well that pt may want a private room. [facility] with other family members being a pt there. sw to follow up in a.m. For choice. Marin Zheng  6/25/2019

## 2019-06-26 ENCOUNTER — APPOINTMENT (OUTPATIENT)
Dept: GENERAL RADIOLOGY | Age: 24
DRG: 003 | End: 2019-06-26
Payer: OTHER MISCELLANEOUS

## 2019-06-26 LAB
ANION GAP SERPL CALCULATED.3IONS-SCNC: 12 MMOL/L (ref 7–16)
BASOPHILS ABSOLUTE: 0.16 E9/L (ref 0–0.2)
BASOPHILS RELATIVE PERCENT: 0.9 % (ref 0–2)
BUN BLDV-MCNC: 16 MG/DL (ref 6–20)
CALCIUM IONIZED: 1.24 MMOL/L (ref 1.15–1.33)
CALCIUM SERPL-MCNC: 8.3 MG/DL (ref 8.6–10.2)
CHLORIDE BLD-SCNC: 99 MMOL/L (ref 98–107)
CO2: 22 MMOL/L (ref 22–29)
CREAT SERPL-MCNC: 0.5 MG/DL (ref 0.7–1.2)
EOSINOPHILS ABSOLUTE: 0.16 E9/L (ref 0.05–0.5)
EOSINOPHILS RELATIVE PERCENT: 0.9 % (ref 0–6)
GFR AFRICAN AMERICAN: >60
GFR NON-AFRICAN AMERICAN: >60 ML/MIN/1.73
GLUCOSE BLD-MCNC: 170 MG/DL (ref 74–99)
HCT VFR BLD CALC: 25.2 % (ref 37–54)
HEMOGLOBIN: 7.6 G/DL (ref 12.5–16.5)
LYMPHOCYTES ABSOLUTE: 0.69 E9/L (ref 1.5–4)
LYMPHOCYTES RELATIVE PERCENT: 4.3 % (ref 20–42)
MAGNESIUM: 2.4 MG/DL (ref 1.6–2.6)
MCH RBC QN AUTO: 27 PG (ref 26–35)
MCHC RBC AUTO-ENTMCNC: 30.2 % (ref 32–34.5)
MCV RBC AUTO: 89.4 FL (ref 80–99.9)
MONOCYTES ABSOLUTE: 1.73 E9/L (ref 0.1–0.95)
MONOCYTES RELATIVE PERCENT: 9.6 % (ref 2–12)
NEUTROPHILS ABSOLUTE: 14.53 E9/L (ref 1.8–7.3)
NEUTROPHILS RELATIVE PERCENT: 84.3 % (ref 43–80)
PDW BLD-RTO: 16.2 FL (ref 11.5–15)
PHOSPHORUS: 3.5 MG/DL (ref 2.5–4.5)
PLATELET # BLD: 545 E9/L (ref 130–450)
PMV BLD AUTO: 9 FL (ref 7–12)
POLYCHROMASIA: ABNORMAL
POTASSIUM SERPL-SCNC: 4 MMOL/L (ref 3.5–5)
RBC # BLD: 2.82 E12/L (ref 3.8–5.8)
SODIUM BLD-SCNC: 133 MMOL/L (ref 132–146)
WBC # BLD: 17.3 E9/L (ref 4.5–11.5)

## 2019-06-26 PROCEDURE — 97530 THERAPEUTIC ACTIVITIES: CPT

## 2019-06-26 PROCEDURE — 6370000000 HC RX 637 (ALT 250 FOR IP): Performed by: STUDENT IN AN ORGANIZED HEALTH CARE EDUCATION/TRAINING PROGRAM

## 2019-06-26 PROCEDURE — 71045 X-RAY EXAM CHEST 1 VIEW: CPT

## 2019-06-26 PROCEDURE — 2580000003 HC RX 258: Performed by: STUDENT IN AN ORGANIZED HEALTH CARE EDUCATION/TRAINING PROGRAM

## 2019-06-26 PROCEDURE — 6360000002 HC RX W HCPCS: Performed by: STUDENT IN AN ORGANIZED HEALTH CARE EDUCATION/TRAINING PROGRAM

## 2019-06-26 PROCEDURE — 82330 ASSAY OF CALCIUM: CPT

## 2019-06-26 PROCEDURE — 83735 ASSAY OF MAGNESIUM: CPT

## 2019-06-26 PROCEDURE — 97535 SELF CARE MNGMENT TRAINING: CPT

## 2019-06-26 PROCEDURE — 6370000000 HC RX 637 (ALT 250 FOR IP): Performed by: SURGERY

## 2019-06-26 PROCEDURE — 85025 COMPLETE CBC W/AUTO DIFF WBC: CPT

## 2019-06-26 PROCEDURE — 2000000000 HC ICU R&B

## 2019-06-26 PROCEDURE — 94003 VENT MGMT INPAT SUBQ DAY: CPT

## 2019-06-26 PROCEDURE — 99291 CRITICAL CARE FIRST HOUR: CPT | Performed by: SURGERY

## 2019-06-26 PROCEDURE — 80048 BASIC METABOLIC PNL TOTAL CA: CPT

## 2019-06-26 PROCEDURE — 94640 AIRWAY INHALATION TREATMENT: CPT

## 2019-06-26 PROCEDURE — 84100 ASSAY OF PHOSPHORUS: CPT

## 2019-06-26 PROCEDURE — 36415 COLL VENOUS BLD VENIPUNCTURE: CPT

## 2019-06-26 RX ORDER — OXYCODONE HCL 20 MG/ML
15 CONCENTRATE, ORAL ORAL EVERY 6 HOURS
Status: DISCONTINUED | OUTPATIENT
Start: 2019-06-26 | End: 2019-06-27

## 2019-06-26 RX ORDER — QUETIAPINE FUMARATE 25 MG/1
25 TABLET, FILM COATED ORAL NIGHTLY
Status: DISCONTINUED | OUTPATIENT
Start: 2019-06-26 | End: 2019-06-26

## 2019-06-26 RX ORDER — QUETIAPINE FUMARATE 25 MG/1
50 TABLET, FILM COATED ORAL NIGHTLY
Status: DISCONTINUED | OUTPATIENT
Start: 2019-06-26 | End: 2019-06-28 | Stop reason: HOSPADM

## 2019-06-26 RX ORDER — IBUPROFEN 400 MG/1
800 TABLET ORAL EVERY 8 HOURS
Status: DISCONTINUED | OUTPATIENT
Start: 2019-06-26 | End: 2019-06-26

## 2019-06-26 RX ORDER — IPRATROPIUM BROMIDE AND ALBUTEROL SULFATE 2.5; .5 MG/3ML; MG/3ML
1 SOLUTION RESPIRATORY (INHALATION) EVERY 4 HOURS PRN
Status: DISCONTINUED | OUTPATIENT
Start: 2019-06-26 | End: 2019-06-28 | Stop reason: HOSPADM

## 2019-06-26 RX ADMIN — QUETIAPINE FUMARATE 50 MG: 25 TABLET ORAL at 21:02

## 2019-06-26 RX ADMIN — METHOCARBAMOL TABLETS 1000 MG: 500 TABLET, COATED ORAL at 15:41

## 2019-06-26 RX ADMIN — ACETAMINOPHEN ORAL SOLUTION 650 MG: 650 SOLUTION ORAL at 21:01

## 2019-06-26 RX ADMIN — IPRATROPIUM BROMIDE AND ALBUTEROL SULFATE 1 AMPULE: .5; 3 SOLUTION RESPIRATORY (INHALATION) at 08:16

## 2019-06-26 RX ADMIN — HEPARIN 300 UNITS: 100 SYRINGE at 21:01

## 2019-06-26 RX ADMIN — METHOCARBAMOL TABLETS 1000 MG: 500 TABLET, COATED ORAL at 09:30

## 2019-06-26 RX ADMIN — DOCUSATE SODIUM 100 MG: 50 LIQUID ORAL at 21:01

## 2019-06-26 RX ADMIN — FLUCONAZOLE 200 MG: 40 POWDER, FOR SUSPENSION ORAL at 08:13

## 2019-06-26 RX ADMIN — METHOCARBAMOL TABLETS 1000 MG: 500 TABLET, COATED ORAL at 03:05

## 2019-06-26 RX ADMIN — BACITRACIN ZINC: 500 OINTMENT TOPICAL at 14:06

## 2019-06-26 RX ADMIN — BACITRACIN ZINC: 500 OINTMENT TOPICAL at 08:13

## 2019-06-26 RX ADMIN — LACTULOSE 20 G: 20 SOLUTION ORAL at 18:07

## 2019-06-26 RX ADMIN — CLONIDINE HYDROCHLORIDE 0.1 MG: 0.1 TABLET ORAL at 21:02

## 2019-06-26 RX ADMIN — HEPARIN 300 UNITS: 100 SYRINGE at 08:14

## 2019-06-26 RX ADMIN — CEFAZOLIN 3 G: 10 INJECTION, POWDER, FOR SOLUTION INTRAVENOUS; PARENTERAL at 01:33

## 2019-06-26 RX ADMIN — BACITRACIN ZINC: 500 OINTMENT TOPICAL at 21:01

## 2019-06-26 RX ADMIN — SENNOSIDES 10 ML: 8.8 SYRUP ORAL at 21:02

## 2019-06-26 RX ADMIN — OXYCODONE HYDROCHLORIDE 15 MG: 100 SOLUTION ORAL at 14:06

## 2019-06-26 RX ADMIN — Medication 10 ML: at 08:15

## 2019-06-26 RX ADMIN — LACTULOSE 20 G: 20 SOLUTION ORAL at 05:47

## 2019-06-26 RX ADMIN — ACETAMINOPHEN ORAL SOLUTION 650 MG: 650 SOLUTION ORAL at 14:10

## 2019-06-26 RX ADMIN — ENOXAPARIN SODIUM 40 MG: 40 INJECTION, SOLUTION INTRAVENOUS; SUBCUTANEOUS at 08:14

## 2019-06-26 RX ADMIN — ENOXAPARIN SODIUM 40 MG: 40 INJECTION, SOLUTION INTRAVENOUS; SUBCUTANEOUS at 21:01

## 2019-06-26 RX ADMIN — Medication 10 MG: at 08:14

## 2019-06-26 RX ADMIN — DOCUSATE SODIUM 100 MG: 50 LIQUID ORAL at 08:13

## 2019-06-26 RX ADMIN — OXYCODONE HYDROCHLORIDE 20 MG: 100 SOLUTION ORAL at 08:15

## 2019-06-26 RX ADMIN — CHLORHEXIDINE GLUCONATE 0.12% ORAL RINSE 15 ML: 1.2 LIQUID ORAL at 21:02

## 2019-06-26 RX ADMIN — PANTOPRAZOLE SODIUM 40 MG: 40 GRANULE, DELAYED RELEASE ORAL at 05:47

## 2019-06-26 RX ADMIN — CHLORHEXIDINE GLUCONATE 0.12% ORAL RINSE 15 ML: 1.2 LIQUID ORAL at 08:13

## 2019-06-26 RX ADMIN — METHOCARBAMOL TABLETS 1000 MG: 500 TABLET, COATED ORAL at 21:01

## 2019-06-26 RX ADMIN — CLONIDINE HYDROCHLORIDE 0.1 MG: 0.1 TABLET ORAL at 05:47

## 2019-06-26 RX ADMIN — OXYCODONE HYDROCHLORIDE 15 MG: 100 SOLUTION ORAL at 21:02

## 2019-06-26 RX ADMIN — CLONIDINE HYDROCHLORIDE 0.1 MG: 0.1 TABLET ORAL at 14:06

## 2019-06-26 RX ADMIN — MICONAZOLE NITRATE: 20.6 POWDER TOPICAL at 08:13

## 2019-06-26 RX ADMIN — OXYCODONE HYDROCHLORIDE 20 MG: 100 SOLUTION ORAL at 02:32

## 2019-06-26 RX ADMIN — IBUPROFEN 800 MG: 200 SUSPENSION ORAL at 18:54

## 2019-06-26 RX ADMIN — LACTULOSE 20 G: 20 SOLUTION ORAL at 11:42

## 2019-06-26 RX ADMIN — ACETAMINOPHEN ORAL SOLUTION 650 MG: 650 SOLUTION ORAL at 02:32

## 2019-06-26 RX ADMIN — Medication 10 ML: at 21:02

## 2019-06-26 RX ADMIN — CEFAZOLIN 3 G: 10 INJECTION, POWDER, FOR SOLUTION INTRAVENOUS; PARENTERAL at 09:33

## 2019-06-26 RX ADMIN — Medication 10 MG: at 21:02

## 2019-06-26 RX ADMIN — MICONAZOLE NITRATE: 20.6 POWDER TOPICAL at 21:01

## 2019-06-26 ASSESSMENT — PULMONARY FUNCTION TESTS
PIF_VALUE: 26
PIF_VALUE: 25
PIF_VALUE: 26
PIF_VALUE: 25
PIF_VALUE: 26
PIF_VALUE: 27
PIF_VALUE: 26
PIF_VALUE: 26
PIF_VALUE: 27
PIF_VALUE: 26
PIF_VALUE: 26
PIF_VALUE: 27
PIF_VALUE: 26
PIF_VALUE: 25
PIF_VALUE: 27
PIF_VALUE: 26

## 2019-06-26 ASSESSMENT — PAIN SCALES - GENERAL
PAINLEVEL_OUTOF10: 0

## 2019-06-26 NOTE — PLAN OF CARE
Problem: Falls - Risk of:  Goal: Will remain free from falls  Description  Will remain free from falls  Outcome: Met This Shift     Problem: Pain:  Goal: Pain level will decrease  Description  Pain level will decrease  Outcome: Met This Shift     Problem: Airway Clearance - Ineffective:  Goal: Ability to maintain a clear airway will improve  Description  Ability to maintain a clear airway will improve  Outcome: Ongoing

## 2019-06-26 NOTE — PLAN OF CARE
Problem: Falls - Risk of:  Goal: Will remain free from falls  Description  Will remain free from falls  6/26/2019 0036 by Nely Jerome RN  Outcome: Met This Shift  6/25/2019 1952 by Bing Shaw RN  Outcome: Met This Shift  Goal: Absence of physical injury  Description  Absence of physical injury  6/26/2019 0036 by Nely Jerome RN  Outcome: Met This Shift  6/25/2019 1952 by Bing Shaw RN  Outcome: Met This Shift     Problem: Risk for Impaired Skin Integrity  Goal: Tissue integrity - skin and mucous membranes  Description  Structural intactness and normal physiological function of skin and  mucous membranes.   Outcome: Met This Shift     Problem: Musculor/Skeletal Functional Status  Goal: Absence of falls  6/25/2019 1952 by Bing Shaw RN  Outcome: Met This Shift     Problem: Pain:  Goal: Pain level will decrease  Description  Pain level will decrease  6/26/2019 0036 by Nely Jerome RN  Outcome: Met This Shift  6/25/2019 1952 by Bing Shaw RN  Outcome: Met This Shift  Goal: Control of acute pain  Description  Control of acute pain  Outcome: Met This Shift     Problem: Airway Clearance - Ineffective:  Goal: Ability to maintain a clear airway will improve  Description  Ability to maintain a clear airway will improve  6/26/2019 0036 by Nely Jerome RN  Outcome: Met This Shift  6/25/2019 1952 by Bing Shaw RN  Outcome: Met This Shift

## 2019-06-26 NOTE — PROGRESS NOTES
Gastrostomy  · Formula: Fluid Restricted  · Rate (ml/hr):60ml/hr    · Volume (ml/day): 1440ml TV   · Duration: Continuous  · Current TF & Flush Orders Provides: 2880kcals, 120gm pro, 1008ml water   · Anthropometric Measures:  · Ht: 6' (182.9 cm)   · Current Body Wt: 395 lb (179.2 kg)(6/26 actual)  · Admission Body Wt: 339 lb (153.8 kg)(6/12 actual)  · Usual Body Wt: (UTO)  · Weight Change: noted wt gain since admit, current fluids + at this time. unable to assess overall wt hx at this time    · Ideal Body Wt: 178 lb (80.7 kg), % Ideal Body 190%(using admit wt )  · BMI Classification: BMI > or equal to 40.0 Obese Class III    Nutrition Interventions:   Continued Inpatient Monitoring, Education not appropriate at this time, Coordination of Care    Nutrition Evaluation:   · Evaluation: Goals set   · Goals:  Tolerance to EN    · Monitoring: TF Intake, TF Tolerance, Skin Integrity, Wound Healing, I&O, Monitor Hemodynamic Status, Mental Status/Confusion, Weight, Pertinent Labs, Monitor Bowel Function      Electronically signed by Holly Chadwick MS, RD, LD on 6/26/19 at 1:12 PM  Contact Number: [phone]
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
## 2019-06-26 NOTE — PROGRESS NOTES
throughout session; Responds to questions appropriately with use of hands / head nods     Communication: Responds to questions appropriately with use of gestures / head nods. Eyes shut majority of session but able to open when cued to do so.              Memory: F+              Sequencing: F+              Problem solving: F              Judgement/safety: F     RASS: 0  CAM-ICU: Negative     Functional Assessment:    Initial Eval Status  Date: 6/17/19 Treatment Status  Date: 6/26/19 Short Term Goals  Treatment frequency: 1-3x/week on ICU; PRN on stepdown unit  -pt will improve. ..    Feeding NPO  NPO     peg     Indep  Sitting upright in chair for majority of meals; pending cleared diet restriction   Grooming Mod A overall     SBA to wash face/wipe eyes at bed level     Max A oral suction use d/t secretions     Min A overall     Set-up: Face wash, swabbing mouth  completed this session while seated EOB with Min A for sitting balance     Supervision  while seated EOB; G BUE functional use; no LOB      UB Dressing Max A     Max A  Donning gown at bed level semi-reclined position, able to lift each arm off bedside but mod A to manage gown fully up to each shoulder  Per last tx session; declined this date     Supervision  while seated EOB; G BUE functional use; no LOB   LB Dressing DEP  Donning socks only  DEP          Min A   with AE/device PRN   Bathing UB-  Mod A  LB-  Max A NT     UB-  Sup  LB-  Mod A with AE/DME prn   Toileting DEP  Radford; bed level  DEP     (radford)     Min A  including clothing mgmt and toileting hygiene using DME/device prn   Bed Mobility  Supine to sit: Max A x2 with elevated HOB  Sit to supine: Max A x2  Rolling: NT Supine to sit:  Max A x2  Sit to supine: Max A x2  Rolling: NT   Repositioning: Dep x2    Min A   in prep for EOB ADL tasks   Functional/  Bathroom  Transfers Sit to stand: NT  Stand to sit: NT  Stand pivot: NT Sit to stand: NT  Stand to sit: NT  Stand pivot: NT   Mod A  from varying surfaces using device/DME prn;      good adherence to NWB LLE   Functional Mobility NT  NT  Mod A   Bed <> bathroom distance using device (per PT recommendation) prn     good adherence to NWB LLE   Balance Sitting:     Static:  Mod A    Dynamic:Max A  Standing:     Static:  NT    Dynamic:NT  Sitting:     Static: SBA<>min A    Dynamic: Min A  Standing:     Static:  NT    Dynamic:NT demo Supervision dynamic sitting balance EOB during ADL tasks; Mod A dynamic standing balance during ADL tasks using device prn   Endurance/  Activity Tolerance P+ activity tolerance/endurance during light ADL task ; sitting tolerance EOB ~5 minutes   F activity tolerance/endurance during light BUE AROM exercises and grooming tasks seated EOB ~10 minutes   demo G activity tolerance/endurance during [id number] min ADL task      G demo of EC, pacing and proper breathing techniques   Visual/  Perceptual Glasses: none     Pt with F+ visual attn; F smooth visual scanning; jumpiness noted L eye.      L eye strabismus exotropia (L lateral/outward turning eye)     Able to duplicate number of fingers in front     F+ accuracy with line bisection test with mod VC to scan L side of paper to locate          Safety P       F    G    BUE strength/endurance See below  See below  Good tolerance to BUE AAROM/AROM exercise to improve overall use during ADLs and functional mobility       Hand dominance: right       Strength ROM  Comments   RUE  Proximal: NT d/t sternoclavicular joint distraction  Distal: 4+/5 Proximal:  -PROM: WFL   -AROM: WFL  Distal: WFL G   F FMC/dexterity noted during ADL tasks   LUE Proximal: 4-/5  Distal: 4+/5 Proximal:  -PROM: WFL   -AROM: WFL  Distal: WFL G   F FMC/dexterity noted during ADL tasks         Vitals:  Blood Pressure at rest 135/76 Blood Pressure post session 117/83   Heart Rate at rest 94 Heart Rate post session 99   SPO2 at rest 99% SPO2 post session 96%         Comments: Upon arrival pt semi-supine in bed and
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
## 2019-06-26 NOTE — CARE COORDINATION
Followed up with mom re: [facility] choice. She prefers [facility] here if a private room is available. Referral made to [facility] and was informed that they can accommodate a private room. If accepted , they will start precert.
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
## 2019-06-27 DIAGNOSIS — S72.452D CLOSED DISPLACED SUPRACONDYLAR FRACTURE OF DISTAL END OF LEFT FEMUR WITHOUT INTRACONDYLAR EXTENSION WITH ROUTINE HEALING, SUBSEQUENT ENCOUNTER: Primary | ICD-10-CM

## 2019-06-27 LAB
AADO2: 156.3 MMHG
ANION GAP SERPL CALCULATED.3IONS-SCNC: 10 MMOL/L (ref 7–16)
ANISOCYTOSIS: ABNORMAL
B.E.: 0.7 MMOL/L (ref -3–3)
BASOPHILS ABSOLUTE: 0 E9/L (ref 0–0.2)
BASOPHILS RELATIVE PERCENT: 0.4 % (ref 0–2)
BUN BLDV-MCNC: 16 MG/DL (ref 6–20)
CALCIUM IONIZED: 1.22 MMOL/L (ref 1.15–1.33)
CALCIUM SERPL-MCNC: 8.3 MG/DL (ref 8.6–10.2)
CHLORIDE BLD-SCNC: 101 MMOL/L (ref 98–107)
CO2: 23 MMOL/L (ref 22–29)
COHB: 1.7 % (ref 0–1.5)
CREAT SERPL-MCNC: 0.4 MG/DL (ref 0.7–1.2)
CRITICAL: ABNORMAL
DATE ANALYZED: ABNORMAL
DATE OF COLLECTION: ABNORMAL
EOSINOPHILS ABSOLUTE: 0.14 E9/L (ref 0.05–0.5)
EOSINOPHILS RELATIVE PERCENT: 0.9 % (ref 0–6)
FIO2: 40 %
GFR AFRICAN AMERICAN: >60
GFR NON-AFRICAN AMERICAN: >60 ML/MIN/1.73
GLUCOSE BLD-MCNC: 128 MG/DL (ref 74–99)
HCO3: 24.4 MMOL/L (ref 22–26)
HCT VFR BLD CALC: 25.8 % (ref 37–54)
HEMOGLOBIN: 7.7 G/DL (ref 12.5–16.5)
HHB: 4.4 % (ref 0–5)
HYPOCHROMIA: ABNORMAL
LAB: ABNORMAL
LYMPHOCYTES ABSOLUTE: 0.47 E9/L (ref 1.5–4)
LYMPHOCYTES RELATIVE PERCENT: 2.6 % (ref 20–42)
Lab: ABNORMAL
MAGNESIUM: 2.4 MG/DL (ref 1.6–2.6)
MCH RBC QN AUTO: 27 PG (ref 26–35)
MCHC RBC AUTO-ENTMCNC: 29.8 % (ref 32–34.5)
MCV RBC AUTO: 90.5 FL (ref 80–99.9)
METAMYELOCYTES RELATIVE PERCENT: 0.9 % (ref 0–1)
METHB: 0.3 % (ref 0–1.5)
MODE: ABNORMAL
MONOCYTES ABSOLUTE: 0.62 E9/L (ref 0.1–0.95)
MONOCYTES RELATIVE PERCENT: 4.4 % (ref 2–12)
NEUTROPHILS ABSOLUTE: 14.26 E9/L (ref 1.8–7.3)
NEUTROPHILS RELATIVE PERCENT: 91.2 % (ref 43–80)
O2 SATURATION: 95.5 % (ref 92–98.5)
O2HB: 93.6 % (ref 94–97)
OPERATOR ID: 2577
OVALOCYTES: ABNORMAL
PATIENT TEMP: 37 C
PCO2: 35.1 MMHG (ref 35–45)
PDW BLD-RTO: 16.1 FL (ref 11.5–15)
PEEP/CPAP: 12 CMH2O
PFO2: 1.96 MMHG/%
PH BLOOD GAS: 7.46 (ref 7.35–7.45)
PHOSPHORUS: 2.9 MG/DL (ref 2.5–4.5)
PLATELET # BLD: 538 E9/L (ref 130–450)
PMV BLD AUTO: 8.8 FL (ref 7–12)
PO2: 78.5 MMHG (ref 60–100)
POIKILOCYTES: ABNORMAL
POLYCHROMASIA: ABNORMAL
POTASSIUM SERPL-SCNC: 3.9 MMOL/L (ref 3.5–5)
PS: 13 CMH20
RBC # BLD: 2.85 E12/L (ref 3.8–5.8)
RI(T): 1.99
SCHISTOCYTES: ABNORMAL
SODIUM BLD-SCNC: 134 MMOL/L (ref 132–146)
SOURCE, BLOOD GAS: ABNORMAL
THB: 8.9 G/DL (ref 11.5–16.5)
TIME ANALYZED: 510
WBC # BLD: 15.5 E9/L (ref 4.5–11.5)

## 2019-06-27 PROCEDURE — 94003 VENT MGMT INPAT SUBQ DAY: CPT

## 2019-06-27 PROCEDURE — 6370000000 HC RX 637 (ALT 250 FOR IP): Performed by: STUDENT IN AN ORGANIZED HEALTH CARE EDUCATION/TRAINING PROGRAM

## 2019-06-27 PROCEDURE — 6360000002 HC RX W HCPCS: Performed by: STUDENT IN AN ORGANIZED HEALTH CARE EDUCATION/TRAINING PROGRAM

## 2019-06-27 PROCEDURE — 97530 THERAPEUTIC ACTIVITIES: CPT

## 2019-06-27 PROCEDURE — 6370000000 HC RX 637 (ALT 250 FOR IP): Performed by: SURGERY

## 2019-06-27 PROCEDURE — 83735 ASSAY OF MAGNESIUM: CPT

## 2019-06-27 PROCEDURE — 97110 THERAPEUTIC EXERCISES: CPT

## 2019-06-27 PROCEDURE — 36592 COLLECT BLOOD FROM PICC: CPT

## 2019-06-27 PROCEDURE — 36415 COLL VENOUS BLD VENIPUNCTURE: CPT

## 2019-06-27 PROCEDURE — 82330 ASSAY OF CALCIUM: CPT

## 2019-06-27 PROCEDURE — 97535 SELF CARE MNGMENT TRAINING: CPT

## 2019-06-27 PROCEDURE — 99291 CRITICAL CARE FIRST HOUR: CPT | Performed by: SURGERY

## 2019-06-27 PROCEDURE — 2580000003 HC RX 258: Performed by: STUDENT IN AN ORGANIZED HEALTH CARE EDUCATION/TRAINING PROGRAM

## 2019-06-27 PROCEDURE — 85025 COMPLETE CBC W/AUTO DIFF WBC: CPT

## 2019-06-27 PROCEDURE — 80048 BASIC METABOLIC PNL TOTAL CA: CPT

## 2019-06-27 PROCEDURE — 82805 BLOOD GASES W/O2 SATURATION: CPT

## 2019-06-27 PROCEDURE — 2000000000 HC ICU R&B

## 2019-06-27 PROCEDURE — 84100 ASSAY OF PHOSPHORUS: CPT

## 2019-06-27 RX ORDER — OXYCODONE HCL 20 MG/ML
10 CONCENTRATE, ORAL ORAL EVERY 6 HOURS
Status: DISCONTINUED | OUTPATIENT
Start: 2019-06-27 | End: 2019-06-28 | Stop reason: HOSPADM

## 2019-06-27 RX ADMIN — CHLORHEXIDINE GLUCONATE 0.12% ORAL RINSE 15 ML: 1.2 LIQUID ORAL at 08:45

## 2019-06-27 RX ADMIN — Medication 10 MG: at 08:45

## 2019-06-27 RX ADMIN — MICONAZOLE NITRATE: 20.6 POWDER TOPICAL at 08:45

## 2019-06-27 RX ADMIN — METHOCARBAMOL TABLETS 1000 MG: 500 TABLET, COATED ORAL at 21:18

## 2019-06-27 RX ADMIN — OXYCODONE HYDROCHLORIDE 10 MG: 100 SOLUTION ORAL at 14:57

## 2019-06-27 RX ADMIN — QUETIAPINE FUMARATE 50 MG: 25 TABLET ORAL at 21:12

## 2019-06-27 RX ADMIN — Medication 10 MG: at 21:12

## 2019-06-27 RX ADMIN — ENOXAPARIN SODIUM 40 MG: 40 INJECTION, SOLUTION INTRAVENOUS; SUBCUTANEOUS at 08:44

## 2019-06-27 RX ADMIN — CLONIDINE HYDROCHLORIDE 0.1 MG: 0.1 TABLET ORAL at 05:11

## 2019-06-27 RX ADMIN — LACTULOSE 20 G: 20 SOLUTION ORAL at 17:17

## 2019-06-27 RX ADMIN — BACITRACIN ZINC: 500 OINTMENT TOPICAL at 08:45

## 2019-06-27 RX ADMIN — CHLORHEXIDINE GLUCONATE 0.12% ORAL RINSE 15 ML: 1.2 LIQUID ORAL at 21:16

## 2019-06-27 RX ADMIN — IBUPROFEN 800 MG: 200 SUSPENSION ORAL at 10:56

## 2019-06-27 RX ADMIN — METHOCARBAMOL TABLETS 1000 MG: 500 TABLET, COATED ORAL at 10:56

## 2019-06-27 RX ADMIN — LACTULOSE 20 G: 20 SOLUTION ORAL at 11:57

## 2019-06-27 RX ADMIN — BACITRACIN ZINC: 500 OINTMENT TOPICAL at 14:57

## 2019-06-27 RX ADMIN — PANTOPRAZOLE SODIUM 40 MG: 40 GRANULE, DELAYED RELEASE ORAL at 05:11

## 2019-06-27 RX ADMIN — DOCUSATE SODIUM 100 MG: 50 LIQUID ORAL at 21:12

## 2019-06-27 RX ADMIN — METHOCARBAMOL TABLETS 1000 MG: 500 TABLET, COATED ORAL at 15:57

## 2019-06-27 RX ADMIN — OXYCODONE HYDROCHLORIDE 15 MG: 100 SOLUTION ORAL at 03:08

## 2019-06-27 RX ADMIN — OXYCODONE HYDROCHLORIDE 10 MG: 100 SOLUTION ORAL at 21:13

## 2019-06-27 RX ADMIN — ACETAMINOPHEN ORAL SOLUTION 650 MG: 650 SOLUTION ORAL at 08:45

## 2019-06-27 RX ADMIN — ENOXAPARIN SODIUM 40 MG: 40 INJECTION, SOLUTION INTRAVENOUS; SUBCUTANEOUS at 21:13

## 2019-06-27 RX ADMIN — CLONIDINE HYDROCHLORIDE 0.1 MG: 0.1 TABLET ORAL at 14:57

## 2019-06-27 RX ADMIN — DOCUSATE SODIUM 100 MG: 50 LIQUID ORAL at 08:45

## 2019-06-27 RX ADMIN — HEPARIN 300 UNITS: 100 SYRINGE at 21:17

## 2019-06-27 RX ADMIN — OXYCODONE HYDROCHLORIDE 15 MG: 100 SOLUTION ORAL at 08:45

## 2019-06-27 RX ADMIN — LACTULOSE 20 G: 20 SOLUTION ORAL at 05:11

## 2019-06-27 RX ADMIN — Medication 10 ML: at 21:14

## 2019-06-27 RX ADMIN — IBUPROFEN 800 MG: 200 SUSPENSION ORAL at 05:12

## 2019-06-27 RX ADMIN — BACITRACIN ZINC: 500 OINTMENT TOPICAL at 21:13

## 2019-06-27 RX ADMIN — HEPARIN 300 UNITS: 100 SYRINGE at 08:45

## 2019-06-27 RX ADMIN — LACTULOSE 20 G: 20 SOLUTION ORAL at 00:18

## 2019-06-27 RX ADMIN — Medication 10 ML: at 08:46

## 2019-06-27 RX ADMIN — CLONIDINE HYDROCHLORIDE 0.1 MG: 0.1 TABLET ORAL at 21:18

## 2019-06-27 RX ADMIN — SENNOSIDES 10 ML: 8.8 SYRUP ORAL at 21:13

## 2019-06-27 RX ADMIN — METHOCARBAMOL TABLETS 1000 MG: 500 TABLET, COATED ORAL at 03:08

## 2019-06-27 RX ADMIN — MICONAZOLE NITRATE: 20.6 POWDER TOPICAL at 21:13

## 2019-06-27 RX ADMIN — IBUPROFEN 800 MG: 200 SUSPENSION ORAL at 18:33

## 2019-06-27 ASSESSMENT — PAIN SCALES - GENERAL
PAINLEVEL_OUTOF10: 0

## 2019-06-27 ASSESSMENT — PULMONARY FUNCTION TESTS
PIF_VALUE: 26
PIF_VALUE: 25
PIF_VALUE: 26
PIF_VALUE: 25
PIF_VALUE: 26
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
## 2019-06-28 ENCOUNTER — APPOINTMENT (OUTPATIENT)
Dept: GENERAL RADIOLOGY | Age: 24
DRG: 003 | End: 2019-06-28
Payer: OTHER MISCELLANEOUS

## 2019-06-28 VITALS
RESPIRATION RATE: 24 BRPM | BODY MASS INDEX: 42.66 KG/M2 | HEART RATE: 112 BPM | TEMPERATURE: 99.3 F | WEIGHT: 315 LBS | HEIGHT: 72 IN | DIASTOLIC BLOOD PRESSURE: 94 MMHG | OXYGEN SATURATION: 99 % | SYSTOLIC BLOOD PRESSURE: 149 MMHG

## 2019-06-28 LAB
AADO2: 149.3 MMHG
ANION GAP SERPL CALCULATED.3IONS-SCNC: 12 MMOL/L (ref 7–16)
B.E.: 0.1 MMOL/L (ref -3–3)
BASOPHILS ABSOLUTE: 0.07 E9/L (ref 0–0.2)
BASOPHILS RELATIVE PERCENT: 0.5 % (ref 0–2)
BUN BLDV-MCNC: 19 MG/DL (ref 6–20)
CALCIUM IONIZED: 1.24 MMOL/L (ref 1.15–1.33)
CALCIUM SERPL-MCNC: 8.6 MG/DL (ref 8.6–10.2)
CHLORIDE BLD-SCNC: 100 MMOL/L (ref 98–107)
CO2: 25 MMOL/L (ref 22–29)
COHB: 1.6 % (ref 0–1.5)
CREAT SERPL-MCNC: 0.4 MG/DL (ref 0.7–1.2)
CRITICAL: ABNORMAL
DATE ANALYZED: ABNORMAL
DATE OF COLLECTION: ABNORMAL
EOSINOPHILS ABSOLUTE: 0.23 E9/L (ref 0.05–0.5)
EOSINOPHILS RELATIVE PERCENT: 1.7 % (ref 0–6)
FIO2: 40 %
GFR AFRICAN AMERICAN: >60
GFR NON-AFRICAN AMERICAN: >60 ML/MIN/1.73
GLUCOSE BLD-MCNC: 138 MG/DL (ref 74–99)
HCO3: 24 MMOL/L (ref 22–26)
HCT VFR BLD CALC: 26.1 % (ref 37–54)
HEMOGLOBIN: 7.7 G/DL (ref 12.5–16.5)
HHB: 3.9 % (ref 0–5)
IMMATURE GRANULOCYTES #: 0.46 E9/L
IMMATURE GRANULOCYTES %: 3.3 % (ref 0–5)
LAB: ABNORMAL
LYMPHOCYTES ABSOLUTE: 0.88 E9/L (ref 1.5–4)
LYMPHOCYTES RELATIVE PERCENT: 6.4 % (ref 20–42)
Lab: ABNORMAL
MAGNESIUM: 2.3 MG/DL (ref 1.6–2.6)
MCH RBC QN AUTO: 27.3 PG (ref 26–35)
MCHC RBC AUTO-ENTMCNC: 29.5 % (ref 32–34.5)
MCV RBC AUTO: 92.6 FL (ref 80–99.9)
METHB: 0.3 % (ref 0–1.5)
MODE: ABNORMAL
MONOCYTES ABSOLUTE: 1.15 E9/L (ref 0.1–0.95)
MONOCYTES RELATIVE PERCENT: 8.3 % (ref 2–12)
NEUTROPHILS ABSOLUTE: 11.01 E9/L (ref 1.8–7.3)
NEUTROPHILS RELATIVE PERCENT: 79.8 % (ref 43–80)
O2 SATURATION: 96 % (ref 92–98.5)
O2HB: 94.2 % (ref 94–97)
OPERATOR ID: ABNORMAL
PATIENT TEMP: 37 C
PCO2: 35.7 MMHG (ref 35–45)
PDW BLD-RTO: 16 FL (ref 11.5–15)
PFO2: 2.12 MMHG/%
PH BLOOD GAS: 7.45 (ref 7.35–7.45)
PHOSPHORUS: 3.5 MG/DL (ref 2.5–4.5)
PLATELET # BLD: 544 E9/L (ref 130–450)
PMV BLD AUTO: 8.9 FL (ref 7–12)
PO2: 84.8 MMHG (ref 60–100)
POTASSIUM SERPL-SCNC: 4.2 MMOL/L (ref 3.5–5)
PS: 13 CMH20
RBC # BLD: 2.82 E12/L (ref 3.8–5.8)
RI(T): 1.76
SODIUM BLD-SCNC: 137 MMOL/L (ref 132–146)
SOURCE, BLOOD GAS: ABNORMAL
THB: 8.8 G/DL (ref 11.5–16.5)
TIME ANALYZED: 513
WBC # BLD: 13.8 E9/L (ref 4.5–11.5)

## 2019-06-28 PROCEDURE — 97530 THERAPEUTIC ACTIVITIES: CPT

## 2019-06-28 PROCEDURE — 87077 CULTURE AEROBIC IDENTIFY: CPT

## 2019-06-28 PROCEDURE — 87186 SC STD MICRODIL/AGAR DIL: CPT

## 2019-06-28 PROCEDURE — 80048 BASIC METABOLIC PNL TOTAL CA: CPT

## 2019-06-28 PROCEDURE — 6370000000 HC RX 637 (ALT 250 FOR IP): Performed by: SURGERY

## 2019-06-28 PROCEDURE — 73552 X-RAY EXAM OF FEMUR 2/>: CPT

## 2019-06-28 PROCEDURE — 6370000000 HC RX 637 (ALT 250 FOR IP): Performed by: STUDENT IN AN ORGANIZED HEALTH CARE EDUCATION/TRAINING PROGRAM

## 2019-06-28 PROCEDURE — 97535 SELF CARE MNGMENT TRAINING: CPT

## 2019-06-28 PROCEDURE — 82805 BLOOD GASES W/O2 SATURATION: CPT

## 2019-06-28 PROCEDURE — 83735 ASSAY OF MAGNESIUM: CPT

## 2019-06-28 PROCEDURE — 73560 X-RAY EXAM OF KNEE 1 OR 2: CPT

## 2019-06-28 PROCEDURE — 6360000002 HC RX W HCPCS: Performed by: STUDENT IN AN ORGANIZED HEALTH CARE EDUCATION/TRAINING PROGRAM

## 2019-06-28 PROCEDURE — 87070 CULTURE OTHR SPECIMN AEROBIC: CPT

## 2019-06-28 PROCEDURE — 85025 COMPLETE CBC W/AUTO DIFF WBC: CPT

## 2019-06-28 PROCEDURE — 94003 VENT MGMT INPAT SUBQ DAY: CPT

## 2019-06-28 PROCEDURE — 87206 SMEAR FLUORESCENT/ACID STAI: CPT

## 2019-06-28 PROCEDURE — 36415 COLL VENOUS BLD VENIPUNCTURE: CPT

## 2019-06-28 PROCEDURE — 99233 SBSQ HOSP IP/OBS HIGH 50: CPT | Performed by: SURGERY

## 2019-06-28 PROCEDURE — 84100 ASSAY OF PHOSPHORUS: CPT

## 2019-06-28 PROCEDURE — 82330 ASSAY OF CALCIUM: CPT

## 2019-06-28 PROCEDURE — 36592 COLLECT BLOOD FROM PICC: CPT

## 2019-06-28 PROCEDURE — 2580000003 HC RX 258: Performed by: STUDENT IN AN ORGANIZED HEALTH CARE EDUCATION/TRAINING PROGRAM

## 2019-06-28 RX ORDER — QUETIAPINE FUMARATE 50 MG/1
50 TABLET, FILM COATED ORAL NIGHTLY
Qty: 60 TABLET | Refills: 3 | DISCHARGE
Start: 2019-06-28 | End: 2019-08-27 | Stop reason: ALTCHOICE

## 2019-06-28 RX ORDER — CHLORHEXIDINE GLUCONATE 0.12 MG/ML
15 RINSE ORAL 2 TIMES DAILY
Qty: 420 ML | Refills: 0 | DISCHARGE
Start: 2019-06-28 | End: 2019-07-12

## 2019-06-28 RX ORDER — OXYCODONE HYDROCHLORIDE 5 MG/1
10 TABLET ORAL EVERY 6 HOURS PRN
Qty: 28 TABLET | Refills: 0 | Status: SHIPPED | OUTPATIENT
Start: 2019-06-28 | End: 2019-07-05

## 2019-06-28 RX ORDER — LABETALOL HYDROCHLORIDE 5 MG/ML
10 INJECTION, SOLUTION INTRAVENOUS EVERY 10 MIN PRN
DISCHARGE
Start: 2019-06-28 | End: 2019-07-26 | Stop reason: ALTCHOICE

## 2019-06-28 RX ORDER — DOCUSATE SODIUM 50 MG/5ML
100 LIQUID ORAL 2 TIMES DAILY
Qty: 300 ML | Refills: 0 | DISCHARGE
Start: 2019-06-28 | End: 2019-08-27 | Stop reason: ALTCHOICE

## 2019-06-28 RX ORDER — CHOLECALCIFEROL (VITAMIN D3) 125 MCG
10 CAPSULE ORAL NIGHTLY
Refills: 0 | DISCHARGE
Start: 2019-06-28 | End: 2019-07-26 | Stop reason: ALTCHOICE

## 2019-06-28 RX ORDER — BISACODYL 10 MG
10 SUPPOSITORY, RECTAL RECTAL DAILY
Qty: 30 SUPPOSITORY | Refills: 0 | DISCHARGE
Start: 2019-06-29 | End: 2019-07-26 | Stop reason: ALTCHOICE

## 2019-06-28 RX ORDER — DIAPER,BRIEF,INFANT-TODD,DISP
EACH MISCELLANEOUS
Qty: 30 G | Refills: 1 | DISCHARGE
Start: 2019-06-28 | End: 2019-07-08

## 2019-06-28 RX ORDER — IPRATROPIUM BROMIDE AND ALBUTEROL SULFATE 2.5; .5 MG/3ML; MG/3ML
3 SOLUTION RESPIRATORY (INHALATION) EVERY 4 HOURS PRN
Qty: 360 ML | DISCHARGE
Start: 2019-06-28 | End: 2019-07-26

## 2019-06-28 RX ORDER — CLONIDINE HYDROCHLORIDE 0.1 MG/1
0.1 TABLET ORAL EVERY 8 HOURS SCHEDULED
Qty: 60 TABLET | Refills: 3 | DISCHARGE
Start: 2019-06-28 | End: 2021-11-24

## 2019-06-28 RX ORDER — HYDRALAZINE HYDROCHLORIDE 20 MG/ML
10 INJECTION INTRAMUSCULAR; INTRAVENOUS EVERY 10 MIN PRN
DISCHARGE
Start: 2019-06-28 | End: 2019-07-26 | Stop reason: ALTCHOICE

## 2019-06-28 RX ORDER — METHOCARBAMOL 500 MG/1
1000 TABLET, FILM COATED ORAL EVERY 6 HOURS
Qty: 80 TABLET | Refills: 0 | DISCHARGE
Start: 2019-06-28 | End: 2019-07-08

## 2019-06-28 RX ORDER — PANTOPRAZOLE SODIUM 40 MG/1
40 GRANULE, DELAYED RELEASE ORAL
Qty: 30 EACH | Refills: 3 | DISCHARGE
Start: 2019-06-29 | End: 2019-08-27 | Stop reason: ALTCHOICE

## 2019-06-28 RX ORDER — LACTULOSE 10 G/15ML
20 SOLUTION ORAL 3 TIMES DAILY PRN
Refills: 1 | DISCHARGE
Start: 2019-06-28 | End: 2019-07-26 | Stop reason: ALTCHOICE

## 2019-06-28 RX ADMIN — ENOXAPARIN SODIUM 40 MG: 40 INJECTION, SOLUTION INTRAVENOUS; SUBCUTANEOUS at 09:06

## 2019-06-28 RX ADMIN — OXYCODONE HYDROCHLORIDE 10 MG: 100 SOLUTION ORAL at 14:54

## 2019-06-28 RX ADMIN — IBUPROFEN 800 MG: 200 SUSPENSION ORAL at 10:31

## 2019-06-28 RX ADMIN — DOCUSATE SODIUM 100 MG: 50 LIQUID ORAL at 09:04

## 2019-06-28 RX ADMIN — CLONIDINE HYDROCHLORIDE 0.1 MG: 0.1 TABLET ORAL at 05:23

## 2019-06-28 RX ADMIN — PANTOPRAZOLE SODIUM 40 MG: 40 GRANULE, DELAYED RELEASE ORAL at 05:22

## 2019-06-28 RX ADMIN — ACETAMINOPHEN ORAL SOLUTION 650 MG: 650 SOLUTION ORAL at 00:12

## 2019-06-28 RX ADMIN — OXYCODONE HYDROCHLORIDE 10 MG: 100 SOLUTION ORAL at 02:26

## 2019-06-28 RX ADMIN — Medication 10 MG: at 08:57

## 2019-06-28 RX ADMIN — LACTULOSE 20 G: 20 SOLUTION ORAL at 05:22

## 2019-06-28 RX ADMIN — OXYCODONE HYDROCHLORIDE 10 MG: 100 SOLUTION ORAL at 09:05

## 2019-06-28 RX ADMIN — METHOCARBAMOL TABLETS 1000 MG: 500 TABLET, COATED ORAL at 15:00

## 2019-06-28 RX ADMIN — MICONAZOLE NITRATE: 20.6 POWDER TOPICAL at 10:33

## 2019-06-28 RX ADMIN — LACTULOSE 20 G: 20 SOLUTION ORAL at 10:31

## 2019-06-28 RX ADMIN — HEPARIN 300 UNITS: 100 SYRINGE at 09:06

## 2019-06-28 RX ADMIN — METHOCARBAMOL TABLETS 1000 MG: 500 TABLET, COATED ORAL at 10:31

## 2019-06-28 RX ADMIN — CHLORHEXIDINE GLUCONATE 0.12% ORAL RINSE 15 ML: 1.2 LIQUID ORAL at 08:57

## 2019-06-28 RX ADMIN — Medication 10 ML: at 08:58

## 2019-06-28 RX ADMIN — BACITRACIN ZINC: 500 OINTMENT TOPICAL at 13:59

## 2019-06-28 RX ADMIN — BACITRACIN ZINC: 500 OINTMENT TOPICAL at 08:57

## 2019-06-28 RX ADMIN — CLONIDINE HYDROCHLORIDE 0.1 MG: 0.1 TABLET ORAL at 13:58

## 2019-06-28 RX ADMIN — IBUPROFEN 800 MG: 200 SUSPENSION ORAL at 02:25

## 2019-06-28 RX ADMIN — LACTULOSE 20 G: 20 SOLUTION ORAL at 00:12

## 2019-06-28 RX ADMIN — METHOCARBAMOL TABLETS 1000 MG: 500 TABLET, COATED ORAL at 04:50

## 2019-06-28 ASSESSMENT — PULMONARY FUNCTION TESTS
PIF_VALUE: 26
PIF_VALUE: 27
PIF_VALUE: 26

## 2019-06-28 ASSESSMENT — PAIN SCALES - GENERAL
PAINLEVEL_OUTOF10: 0

## 2019-06-28 NOTE — PROGRESS NOTES
Department of Orthopedic Surgery   Progress Note    OP:  SURGEON:  Barry Moreno MD DATE OF PROCEDURE:  06/14/2019  PROCEDURES:  1. Intramedullary nailing of left femoral shaft fracture, retrograde. 2.  Removal of traction pin, superficial implant. Subjective:  Merian Severin is approximately 2 weeks follow-up from the above surgery. Patient is NWB on that extremity. Patient continues SICU care. Trach present. Cervical collar present. Patient working with Therapy at this time and sitting at EOB. Mother present. Patient c/o pain to the left thigh but controlled, no pain to the right shoulder right chest. Patient on lovenox for DVT prevention. Pending placement to [facility].     Objective:    General: Awake and Alert, NAD. Seated at EOB with therapy  Extremity:  Left lower extremity  Skin with multiple abrasions to the anterior lower leg, clean, dry, no surrounding erythema, warmth or fluctuance. Incisions well approximated without signs of redness, warmth or drainage- Suze removed  Mild edema noted  Compartments supple throughout thigh and leg  Calf supple and nontender  Demonstrates active knee flexion/extension, ankle plantar/dorsiflexion/great toe extension. States sensation intact to touch in sural/deep peroneal/superficial peroneal/saphenous/posterior tibial nerve distributions to foot/ankle. Palpable dorsalis pedis and posterior tibialis pulses, cap refill brisk in toes, foot warm/perfused. Patient is seated at EOB with bilateral upper extremities bearing some weight, complains of no pain to the right shoulder on palpation, no TTP over clavicle or SC joint.        BP (!) 148/86   Pulse 107   Temp 99.9 °F (37.7 °C)   Resp 20   Ht 6' (1.829 m)   Wt (!) 395 lb 11.2 oz (179.5 kg)   SpO2 99%   BMI 53.67 kg/m²     CBC:   Lab Results   Component Value Date    WBC 13.8 06/28/2019    HGB 7.7 06/28/2019    HCT 26.1 06/28/2019     06/28/2019     XR:   XR of the left femur and knee demonstrates a retrograde femoral nail in place with helical blade distal fixation, there is no evidence of hardware failure or loosening. Comminution with minimal displacement since intraoperative imaging.      ASSESSMENT  · S/P L femur IMN 6/14/19    PLAN    Staples previously removed  WB:  Partial weight bearing on LLE as patient tolerates  PT: Attend therapy following the Femoral Jeet protocol at the two week kait    DVT: Continue DVT prophylaxis per SICU  OK to leave incision sites open to air  Will continue to follow right shoulder/SC joint while patient is in house for any occult symptoms  Follow up in 4 weeks in office with XR of the left femur    Electronically signed by Trinity Dietz PA-C on 6/28/2019 at 10:04 AM
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
## 2019-06-28 NOTE — PLAN OF CARE
Problem: Falls - Risk of:  Goal: Will remain free from falls  Description  Will remain free from falls  Outcome: Met This Shift  Goal: Absence of physical injury  Description  Absence of physical injury  Outcome: Met This Shift     Problem: Risk for Impaired Skin Integrity  Goal: Tissue integrity - skin and mucous membranes  Description  Structural intactness and normal physiological function of skin and  mucous membranes.   Outcome: Met This Shift     Problem: Pain:  Goal: Pain level will decrease  Description  Pain level will decrease  Outcome: Met This Shift  Goal: Control of acute pain  Description  Control of acute pain  Outcome: Met This Shift
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
## 2019-06-28 NOTE — DISCHARGE INSTR - COC
Continuity of Care Form    Patient Name: Jarad Martins   :  1995  MRN:  [id number]    Admit date:  2019  Discharge date:  19    Code Status Order: Full Code   Advance Directives:   [facility] Documentation     Date/Time Healthcare Directive Type of Healthcare Directive Copy in [facility] [address] Agent's Name Healthcare Agent's Phone Number    19 0031  No, patient does not have an advance directive for healthcare treatment -- -- -- -- --          Admitting Physician:  Mimi Anderson MD  PCP: No primary care provider on file. Discharging Nurse: [facility] Unit/Room#: 9884/1564-A  Discharging Unit Phone Number: [phone]    Emergency Contact:   Extended Emergency Contact Information  Primary Emergency Contact: emilia sanchez  Home Phone: [phone]  Relation: Parent  Preferred language: English   needed? No  Secondary Emergency Contact: [email or web address] Phone: [phone]  Relation: Parent    Past Surgical History:  Past Surgical History:   Procedure Laterality Date    FEMUR FRACTURE SURGERY Left 2019    INTRAMEDULLARY NAIL FEMUR performed by Erasto Jain MD at [address] [place] [address] N/A 2019    OPEN TRACHEOTOMY performed by Christel Pimentel MD at [facility]       Immunization History:   Immunization History   Administered Date(s) Administered    Tdap (Boostrix, Adacel) 2019       Active Problems:  Patient Active Problem List   Diagnosis Code    Trauma T14.90XA    Liver injury S36.119A    Closed displaced supracondylar fracture of distal end of left femur without intracondylar extension ([address] S72.452A    Closed nondisplaced fracture of sixth cervical vertebra ([address] S12.501A    Concussion with loss of consciousness S06. 0X7A    Kidney laceration, right, initial encounter S37.031A    Closed fracture of multiple ribs of both sides S22.43XA    Contusion of both lungs S27.322A  Acute blood loss anemia D62    Acute respiratory failure following trauma and surgery ([facility]) J95.821    Closed traumatic anterior dislocation of right sternoclavicular joint S43.214A    Abdominal contusion S30. 1XXA    Traumatic rhabdomyolysis ([address] T79. 6XXA    Cardiac contusion S26.91XA    Closed displaced subtrochanteric fracture of left femur ([facility]) S72.22XA    Closed displaced comminuted fracture of shaft of left femur ([facility]) S72.352A    Closed traumatic dislocation of scapulothoracic joint, right, initial encounter S43.394A       Isolation/Infection:   Isolation          No Isolation            Nurse Assessment:  Last Vital Signs: /82   Pulse 103   Temp 99.7 °F (37.6 °C) (Bladder)   Resp 25   Ht 6' (1.829 m)   Wt (!) 395 lb 11.2 oz (179.5 kg)   SpO2 100%   BMI 53.67 kg/m²     Last documented pain score (0-10 scale): Pain Level: 0  Last Weight:   Wt Readings from Last 1 Encounters:   19 (!) 395 lb 11.2 oz (179.5 kg)     Mental Status:  oriented and alert    IV Access:  - PICC - site  R Basilic, insertion date: 19    Nursing Mobility/ADLs:  Walking   Dependent  Transfer  Dependent  Bathing  Dependent  Dressing  Dependent  Toileting  Dependent  Feeding  Dependent  Med Admin  Dependent  Med Delivery   crushed    Wound Care Documentation and Therapy:scattered abrasions bilat le, bacitracin ointment tid. left knee incision tanner, left outer thigh incision tanner. Dry gauze to upper inner part of tracheostomy bid. Elimination:  Continence:   · Bowel: {YES }  · Bladder: {YES / L}  Urinary Catheter: Insertion Date: 19   Colostomy/Ileostomy/Ileal Conduit: {YES / UT:35902}       Date of Last BM: 19    Intake/Output Summary (Last 24 hours) at 2019 0714  Last data filed at 2019 0600  Gross per 24 hour   Intake 1811 ml   Output 1515 ml   Net 296 ml     I/O last 3 completed shifts:   In: 2541 [I.V.:153; NG/GT:1658]  Out: 52 [facility] [Urine:1515]    Safety Concerns: None and At Risk for Falls    Impairments/Disabilities:      None    Nutrition Therapy:  Current Nutrition Therapy:   - Tube Feedings:  Fluid Restricted    Routes of Feeding: Gastrostomy Tube  Liquids: {Slp liquid thickness:66368}  Daily Fluid Restriction: no  Last Modified Barium Swallow with Video (Video Swallowing Test): not done    Treatments at the Time of Hospital Discharge:   Respiratory Treatments: ***  Oxygen Therapy:  {Therapy; copd oxygen:36415}  Ventilator:    - Ventilator Settings:    Vt Ordered: 0 mL  Rate Set: 0 bmp  FiO2 : 40 %    PEEP/CPAP: 12  Pressure Support: 13 cmH20    Rehab Therapies: Physical Therapy and Occupational Therapy  Weight Bearing Status/Restrictions: Partial weight bearing (30-50%) only on leg left leg  Other Medical Equipment (for information only, NOT a DME order):  c-collar  Other Treatments[facility]    Patient's personal belongings (please select all that are sent with patient):  felectric fan and 2 hand held fans    RN SIGNATURE:  Electronically signed by Angelica Campos RN on 6/28/19 at 1:50 PM    CASE MANAGEMENT/SOCIAL WORK SECTION    Inpatient Status Date: 06/12/2019    Readmission Risk Assessment Score:  Readmission Risk              Risk of Unplanned Readmission:        13           Discharging to Facility/ Agency   · Name: [facility]  · Address:  · Phone:  · Fax:    Dialysis Facility (if applicable)   · Name:  · Address:  · Dialysis Schedule:  · Phone:  · Fax:    / signature:  Francois Haney Rn    PHYSICIAN SECTION    Prognosis: Good    Condition at Discharge: Stable    Rehab Potential (if transferring to Rehab): Fair    Recommended Labs or Other Treatments After Discharge: routine trach and PEG care. Remove sutures on or after 7/2.      Physician Certification: I certify the above information and transfer of Antonio Sanabria  is necessary for the continuing treatment of the diagnosis listed and that he requires LTAC for less 30

## 2019-06-28 NOTE — PLAN OF CARE
Problem: Falls - Risk of:  Goal: Will remain free from falls  Description  Will remain free from falls  6/28/2019 1117 by Ky Damian RN  Outcome: Met This Shift  6/28/2019 0430 by Romero Shipman RN  Outcome: Met This Shift  Goal: Absence of physical injury  Description  Absence of physical injury  6/28/2019 1117 by Ky Damian RN  Outcome: Met This Shift  6/28/2019 0430 by Romero Shipman RN  Outcome: Met This Shift     Problem: Risk for Impaired Skin Integrity  Goal: Tissue integrity - skin and mucous membranes  Description  Structural intactness and normal physiological function of skin and  mucous membranes.   6/28/2019 1117 by Ky Damian RN  Outcome: Met This Shift  6/28/2019 0430 by Romero Shipman RN  Outcome: Met This Shift     Problem: Musculor/Skeletal Functional Status  Goal: Highest potential functional level  Outcome: Met This Shift  Goal: Absence of falls  Outcome: Met This Shift     Problem: Pain:  Goal: Pain level will decrease  Description  Pain level will decrease  6/28/2019 1117 by Ky Damian RN  Outcome: Met This Shift  6/28/2019 0430 by Romero Shipman RN  Outcome: Met This Shift  Goal: Control of acute pain  Description  Control of acute pain  6/28/2019 1117 by Ky Damian RN  Outcome: Met This Shift  6/28/2019 0430 by Romero Shipman RN  Outcome: Met This Shift  Goal: Control of chronic pain  Description  Control of chronic pain  Outcome: Met This Shift     Problem: Airway Clearance - Ineffective:  Goal: Ability to maintain a clear airway will improve  Description  Ability to maintain a clear airway will improve  Outcome: Met This Shift

## 2019-07-01 LAB
CULTURE, RESPIRATORY: ABNORMAL
CULTURE, RESPIRATORY: ABNORMAL
ORGANISM: ABNORMAL
SMEAR, RESPIRATORY: ABNORMAL

## 2019-07-03 ENCOUNTER — HOSPITAL ENCOUNTER (OUTPATIENT)
Dept: GENERAL RADIOLOGY | Age: 24
Discharge: HOME | End: 2019-07-05
Payer: COMMERCIAL

## 2019-07-03 DIAGNOSIS — J90 PLEURAL EFFUSION: ICD-10-CM

## 2019-07-03 PROCEDURE — 71045 X-RAY EXAM CHEST 1 VIEW: CPT

## 2019-07-22 ENCOUNTER — TELEPHONE (OUTPATIENT)
Dept: SURGERY | Age: 24
End: 2019-07-22

## 2019-07-22 DIAGNOSIS — S43.214D: Primary | ICD-10-CM
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
## 2019-07-24 ENCOUNTER — HOSPITAL ENCOUNTER (EMERGENCY)
Age: 24
Discharge: HOME | End: 2019-07-24
Payer: COMMERCIAL

## 2019-07-24 ENCOUNTER — APPOINTMENT (OUTPATIENT)
Dept: CT IMAGING | Age: 24
End: 2019-07-24
Payer: COMMERCIAL

## 2019-07-24 VITALS
WEIGHT: 315 LBS | HEIGHT: 72 IN | SYSTOLIC BLOOD PRESSURE: 165 MMHG | BODY MASS INDEX: 42.66 KG/M2 | OXYGEN SATURATION: 95 % | HEART RATE: 88 BPM | TEMPERATURE: 98.1 F | RESPIRATION RATE: 16 BRPM | DIASTOLIC BLOOD PRESSURE: 88 MMHG

## 2019-07-24 DIAGNOSIS — S39.012A STRAIN OF LUMBAR REGION, INITIAL ENCOUNTER: ICD-10-CM

## 2019-07-24 DIAGNOSIS — M51.9 LUMBAR DISC DISEASE: Primary | ICD-10-CM

## 2019-07-24 LAB
ALBUMIN SERPL-MCNC: 4.5 G/DL (ref 3.5–5.2)
ALP BLD-CCNC: 123 U/L (ref 40–129)
ALT SERPL-CCNC: 23 U/L (ref 0–40)
ANION GAP SERPL CALCULATED.3IONS-SCNC: 13 MMOL/L (ref 7–16)
AST SERPL-CCNC: 22 U/L (ref 0–39)
BACTERIA: ABNORMAL /HPF
BASOPHILS ABSOLUTE: 0.03 E9/L (ref 0–0.2)
BASOPHILS RELATIVE PERCENT: 0.3 % (ref 0–2)
BILIRUB SERPL-MCNC: 0.6 MG/DL (ref 0–1.2)
BILIRUBIN URINE: ABNORMAL
BLOOD, URINE: ABNORMAL
BUN BLDV-MCNC: 14 MG/DL (ref 6–20)
CALCIUM SERPL-MCNC: 10.4 MG/DL (ref 8.6–10.2)
CHLORIDE BLD-SCNC: 103 MMOL/L (ref 98–107)
CLARITY: CLEAR
CO2: 27 MMOL/L (ref 22–29)
COLOR: YELLOW
CREAT SERPL-MCNC: 0.6 MG/DL (ref 0.7–1.2)
CRYSTALS, UA: ABNORMAL
EOSINOPHILS ABSOLUTE: 0.1 E9/L (ref 0.05–0.5)
EOSINOPHILS RELATIVE PERCENT: 1 % (ref 0–6)
GFR AFRICAN AMERICAN: >60
GFR NON-AFRICAN AMERICAN: >60 ML/MIN/1.73
GLUCOSE BLD-MCNC: 116 MG/DL (ref 74–99)
GLUCOSE URINE: NEGATIVE MG/DL
HCT VFR BLD CALC: 39.4 % (ref 37–54)
HEMOGLOBIN: 12.3 G/DL (ref 12.5–16.5)
IMMATURE GRANULOCYTES #: 0.05 E9/L
IMMATURE GRANULOCYTES %: 0.5 % (ref 0–5)
KETONES, URINE: 15 MG/DL
LACTIC ACID, SEPSIS: 1.2 MMOL/L (ref 0.5–1.9)
LEUKOCYTE ESTERASE, URINE: NEGATIVE
LYMPHOCYTES ABSOLUTE: 0.93 E9/L (ref 1.5–4)
LYMPHOCYTES RELATIVE PERCENT: 9.1 % (ref 20–42)
MCH RBC QN AUTO: 26.5 PG (ref 26–35)
MCHC RBC AUTO-ENTMCNC: 31.2 % (ref 32–34.5)
MCV RBC AUTO: 84.9 FL (ref 80–99.9)
MONOCYTES ABSOLUTE: 0.39 E9/L (ref 0.1–0.95)
MONOCYTES RELATIVE PERCENT: 3.8 % (ref 2–12)
NEUTROPHILS ABSOLUTE: 8.72 E9/L (ref 1.8–7.3)
NEUTROPHILS RELATIVE PERCENT: 85.3 % (ref 43–80)
NITRITE, URINE: NEGATIVE
PDW BLD-RTO: 14.6 FL (ref 11.5–15)
PH UA: 6 (ref 5–9)
PLATELET # BLD: 320 E9/L (ref 130–450)
PMV BLD AUTO: 9.7 FL (ref 7–12)
POTASSIUM SERPL-SCNC: 3.9 MMOL/L (ref 3.5–5)
PROTEIN UA: ABNORMAL MG/DL
RBC # BLD: 4.64 E12/L (ref 3.8–5.8)
RBC UA: ABNORMAL /HPF (ref 0–2)
SODIUM BLD-SCNC: 143 MMOL/L (ref 132–146)
SPECIFIC GRAVITY UA: 1.01 (ref 1–1.03)
TOTAL PROTEIN: 8.9 G/DL (ref 6.4–8.3)
UROBILINOGEN, URINE: 0.2 E.U./DL
WBC # BLD: 10.2 E9/L (ref 4.5–11.5)
WBC UA: ABNORMAL /HPF (ref 0–5)

## 2019-07-24 PROCEDURE — 81001 URINALYSIS AUTO W/SCOPE: CPT

## 2019-07-24 PROCEDURE — 6370000000 HC RX 637 (ALT 250 FOR IP): Performed by: NURSE PRACTITIONER

## 2019-07-24 PROCEDURE — 83605 ASSAY OF LACTIC ACID: CPT

## 2019-07-24 PROCEDURE — 72131 CT LUMBAR SPINE W/O DYE: CPT

## 2019-07-24 PROCEDURE — 99284 EMERGENCY DEPT VISIT MOD MDM: CPT

## 2019-07-24 PROCEDURE — 85025 COMPLETE CBC W/AUTO DIFF WBC: CPT

## 2019-07-24 PROCEDURE — 36415 COLL VENOUS BLD VENIPUNCTURE: CPT

## 2019-07-24 PROCEDURE — 80053 COMPREHEN METABOLIC PANEL: CPT

## 2019-07-24 RX ORDER — ONDANSETRON 4 MG/1
4 TABLET, ORALLY DISINTEGRATING ORAL ONCE
Status: COMPLETED | OUTPATIENT
Start: 2019-07-24 | End: 2019-07-24

## 2019-07-24 RX ORDER — OXYCODONE HYDROCHLORIDE AND ACETAMINOPHEN 5; 325 MG/1; MG/1
1 TABLET ORAL EVERY 6 HOURS PRN
Qty: 10 TABLET | Refills: 0 | Status: SHIPPED | OUTPATIENT
Start: 2019-07-24 | End: 2019-08-16 | Stop reason: SDUPTHER

## 2019-07-24 RX ORDER — OXYCODONE HYDROCHLORIDE AND ACETAMINOPHEN 5; 325 MG/1; MG/1
1 TABLET ORAL ONCE
Status: COMPLETED | OUTPATIENT
Start: 2019-07-24 | End: 2019-07-24

## 2019-07-24 RX ORDER — NAPROXEN 500 MG/1
500 TABLET ORAL 2 TIMES DAILY PRN
Qty: 7 TABLET | Refills: 0 | Status: SHIPPED | OUTPATIENT
Start: 2019-07-24 | End: 2019-08-27 | Stop reason: ALTCHOICE

## 2019-07-24 RX ORDER — ONDANSETRON 4 MG/1
4 TABLET, ORALLY DISINTEGRATING ORAL EVERY 8 HOURS PRN
Qty: 24 TABLET | Refills: 0 | Status: SHIPPED | OUTPATIENT
Start: 2019-07-24 | End: 2019-08-27 | Stop reason: ALTCHOICE

## 2019-07-24 RX ADMIN — ONDANSETRON 4 MG: 4 TABLET, ORALLY DISINTEGRATING ORAL at 18:54

## 2019-07-24 RX ADMIN — OXYCODONE HYDROCHLORIDE AND ACETAMINOPHEN 1 TABLET: 5; 325 TABLET ORAL at 18:54

## 2019-07-24 ASSESSMENT — PAIN DESCRIPTION - LOCATION: LOCATION: BACK

## 2019-07-24 ASSESSMENT — PAIN DESCRIPTION - ORIENTATION: ORIENTATION: LOWER

## 2019-07-24 ASSESSMENT — PAIN DESCRIPTION - FREQUENCY: FREQUENCY: CONTINUOUS

## 2019-07-24 ASSESSMENT — PAIN DESCRIPTION - PAIN TYPE: TYPE: ACUTE PAIN

## 2019-07-24 ASSESSMENT — PAIN SCALES - GENERAL
PAINLEVEL_OUTOF10: 10
PAINLEVEL_OUTOF10: 6

## 2019-07-24 ASSESSMENT — PAIN DESCRIPTION - DESCRIPTORS: DESCRIPTORS: ACHING

## 2019-07-26 ENCOUNTER — HOSPITAL ENCOUNTER (OUTPATIENT)
Dept: GENERAL RADIOLOGY | Age: 24
Discharge: HOME | End: 2019-07-28
Payer: COMMERCIAL

## 2019-07-26 ENCOUNTER — OFFICE VISIT (OUTPATIENT)
Dept: SURGERY | Age: 24
End: 2019-07-26
Payer: COMMERCIAL

## 2019-07-26 ENCOUNTER — OFFICE VISIT (OUTPATIENT)
Dept: ORTHOPEDIC SURGERY | Age: 24
End: 2019-07-26
Payer: COMMERCIAL

## 2019-07-26 VITALS
HEIGHT: 73 IN | BODY MASS INDEX: 40.02 KG/M2 | SYSTOLIC BLOOD PRESSURE: 166 MMHG | OXYGEN SATURATION: 98 % | DIASTOLIC BLOOD PRESSURE: 91 MMHG | WEIGHT: 302 LBS | HEART RATE: 102 BPM | RESPIRATION RATE: 14 BRPM

## 2019-07-26 VITALS
HEART RATE: 99 BPM | HEIGHT: 73 IN | SYSTOLIC BLOOD PRESSURE: 155 MMHG | WEIGHT: 302 LBS | DIASTOLIC BLOOD PRESSURE: 96 MMHG | BODY MASS INDEX: 40.02 KG/M2

## 2019-07-26 DIAGNOSIS — S06.0X9D CONCUSSION WITH LOSS OF CONSCIOUSNESS, SUBSEQUENT ENCOUNTER: ICD-10-CM

## 2019-07-26 DIAGNOSIS — S27.322D CONTUSION OF BOTH LUNGS, SUBSEQUENT ENCOUNTER: ICD-10-CM

## 2019-07-26 DIAGNOSIS — T14.90XA TRAUMA: ICD-10-CM

## 2019-07-26 DIAGNOSIS — S72.452D CLOSED DISPLACED SUPRACONDYLAR FRACTURE OF DISTAL END OF LEFT FEMUR WITHOUT INTRACONDYLAR EXTENSION WITH ROUTINE HEALING, SUBSEQUENT ENCOUNTER: Primary | ICD-10-CM

## 2019-07-26 DIAGNOSIS — S72.452D CLOSED DISPLACED SUPRACONDYLAR FRACTURE OF DISTAL END OF LEFT FEMUR WITHOUT INTRACONDYLAR EXTENSION WITH ROUTINE HEALING, SUBSEQUENT ENCOUNTER: ICD-10-CM

## 2019-07-26 DIAGNOSIS — M54.2 NECK PAIN: Primary | ICD-10-CM

## 2019-07-26 DIAGNOSIS — S36.119D HEPATIC TRAUMA, SUBSEQUENT ENCOUNTER: ICD-10-CM

## 2019-07-26 DIAGNOSIS — S43.214D: ICD-10-CM

## 2019-07-26 PROCEDURE — 99212 OFFICE O/P EST SF 10 MIN: CPT

## 2019-07-26 PROCEDURE — 73000 X-RAY EXAM OF COLLAR BONE: CPT

## 2019-07-26 PROCEDURE — 73552 X-RAY EXAM OF FEMUR 2/>: CPT

## 2019-07-26 PROCEDURE — 1036F TOBACCO NON-USER: CPT | Performed by: NURSE PRACTITIONER

## 2019-07-26 PROCEDURE — 1111F DSCHRG MED/CURRENT MED MERGE: CPT | Performed by: NURSE PRACTITIONER

## 2019-07-26 PROCEDURE — 99212 OFFICE O/P EST SF 10 MIN: CPT | Performed by: NURSE PRACTITIONER

## 2019-07-26 PROCEDURE — 99024 POSTOP FOLLOW-UP VISIT: CPT | Performed by: PHYSICIAN ASSISTANT

## 2019-07-26 PROCEDURE — G8427 DOCREV CUR MEDS BY ELIG CLIN: HCPCS | Performed by: NURSE PRACTITIONER

## 2019-07-26 PROCEDURE — G8417 CALC BMI ABV UP PARAM F/U: HCPCS | Performed by: NURSE PRACTITIONER

## 2019-07-26 PROCEDURE — 73560 X-RAY EXAM OF KNEE 1 OR 2: CPT

## 2019-07-26 RX ORDER — METHOCARBAMOL 500 MG/1
500 TABLET, FILM COATED ORAL 4 TIMES DAILY
COMMUNITY
End: 2020-01-08 | Stop reason: ALTCHOICE

## 2019-07-26 NOTE — PATIENT INSTRUCTIONS
WB:  Weight bearing as tolerated on left lower extremity, and right upper extremity  PT: Attend therapy following the Femur Jeet protocol at the 6 week kait  - new prescription written today to go to [facility]  DVT: since Weightbearing as tolerated no need for DVT prophylaxis from orthopedic standpoint  OK to continue with Naproxen and Tylenol as needed for pain.  Continue with ice and heat as needed  OK to do aquatic therapy if therapy feels you would benefit from this  Continue with good dietary sources of calcium and protein  Order provided today to check vitamin D- if insufficient we'll start on a supplement    Make sure to leave incisions covered or use sunscreen if in direct sunlight to prevent sunburn    Follow up in 6 weeks with new x-rays and reevaluation  Call sooner with any questions or concerns      [address] [place] [address]  [phone]
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
## 2019-07-26 NOTE — PROGRESS NOTES
Objective:    General: Alert and oriented X 3, normocephalic atraumatic, external ears and eye normal, sclera clear, no acute distress, respirations easy and unlabored with no audible wheezes, skin warm and dry, speech and dress appropriate for noted age, affect euthymic. Extremity:  Right Upper Extremity  Skin is clean dry and intact  No edema noted  Radial pulse palpable, fingers warm with BCR  Flex/extension intact to wrist, thumb and fingers  Finger opposition intact  Finger adduction/abduction intact  Finger crossover intact  Subjectively states sensation intact to radial/medial/ulnar distribution  No tenderness palpation over the manubrium, sternoclavicular joint, clavicle or acromioclavicular joint. No tenderness palpation about the right shoulder  Full active range of motion at the right shoulder without pain  Static rotator cuff strength intact  Negative apprehension    Left Lower Extremity  Skin clean dry and intact, without signs of infection  Incisions well healed without signs of redness, warmth or drainage  Multiple areas of abrasion along bilateral lower legs are clean, dry and intact without surrounding warmth, erythema or fluctuance. These appear to be healing well compared to prior exam  Mild edema noted  Compartments supple throughout thigh and leg  Calf supple and nontender  Demonstrates active knee flexion/extension, ankle plantar/dorsiflexion/great toe extension. Active range of motion of the knee 0-120 without pain  Stable to varus and valgus stress  Negative Lachman's and posterior drawer   4+/5 knee extension and knee flexion, 5/5 plantar flexion, dorsiflexion, EHL  No pain with internal and external rotation of the left hip  No tenderness palpation over fracture site  States sensation intact to touch in sural/deep peroneal/superficial peroneal/saphenous/posterior tibial nerve distributions to foot/ankle.    Palpable dorsalis pedis and posterior tibialis pulses, cap refill brisk in toes, foot warm/perfused. BP (!) 155/96 (Site: Right Upper Arm, Position: Sitting, Cuff Size: Large Adult)   Pulse 99   Ht 6' 1\" (1.854 m)   Wt (!) 302 lb (137 kg)   BMI 39.84 kg/m²     XR:   XRs of the right clavicle demonstrates the same distraction from the sternoclavicular joint, no interval change in this appearance or alignment. No other acute osseous abnormality identified    XRs of the left knee and left femur demonstrates stable fixation of retrograde intramedullary femoral jeet with helical screw fixation distally. No evidence of hardware failure or lucency. Comminution without significant change in alignment, there is early interval healing noted compared to prior imaging. AP view of the left hip demonstrates Shenton's line intact, no other acute osseous abnormalities are identified. Assessment:   Diagnosis Orders   1. Closed displaced supracondylar fracture of distal end of left femur without intracondylar extension with routine healing, subsequent encounter  Vitamin D 25 Hydroxy    Amb External Referral To Physical Therapy    XR FEMUR LEFT (MIN 2 VIEWS)    XR KNEE LEFT (1-2 VIEWS)       Plan:  WB:  Weight bearing as tolerated on left lower extremity, and right upper extremity  PT: Attend therapy following the Femur Jeet protocol at the 6 week kait  - new prescription written today to go to [facility]  DVT: since Weightbearing as tolerated no need for DVT prophylaxis from orthopedic standpoint  OK to continue with Naproxen and Tylenol as needed for pain.  Continue with ice and heat as needed  OK to do aquatic therapy if therapy feels you would benefit from this  Continue with good dietary sources of calcium and protein  Order provided today to check vitamin D- if insufficient we'll start on a supplement    Make sure to leave incisions covered or use sunscreen if in direct sunlight to prevent sunburn    Follow up in 6 weeks with new x-rays and reevaluation  Call sooner with any questions or

## 2019-07-29 ENCOUNTER — HOSPITAL ENCOUNTER (OUTPATIENT)
Dept: GENERAL RADIOLOGY | Age: 24
Discharge: HOME | End: 2019-07-31
Payer: OTHER MISCELLANEOUS

## 2019-07-29 ENCOUNTER — OFFICE VISIT (OUTPATIENT)
Dept: NEUROSURGERY | Age: 24
End: 2019-07-29
Payer: COMMERCIAL

## 2019-07-29 ENCOUNTER — HOSPITAL ENCOUNTER (OUTPATIENT)
Age: 24
Discharge: HOME | End: 2019-07-31
Payer: OTHER MISCELLANEOUS

## 2019-07-29 VITALS
SYSTOLIC BLOOD PRESSURE: 149 MMHG | HEIGHT: 73 IN | BODY MASS INDEX: 39.84 KG/M2 | HEART RATE: 128 BPM | DIASTOLIC BLOOD PRESSURE: 99 MMHG

## 2019-07-29 DIAGNOSIS — S12.500D: Primary | ICD-10-CM

## 2019-07-29 DIAGNOSIS — M54.2 NECK PAIN: ICD-10-CM

## 2019-07-29 PROCEDURE — 99212 OFFICE O/P EST SF 10 MIN: CPT | Performed by: PHYSICIAN ASSISTANT

## 2019-07-29 PROCEDURE — G8427 DOCREV CUR MEDS BY ELIG CLIN: HCPCS | Performed by: PHYSICIAN ASSISTANT

## 2019-07-29 PROCEDURE — 72040 X-RAY EXAM NECK SPINE 2-3 VW: CPT

## 2019-07-29 PROCEDURE — 1036F TOBACCO NON-USER: CPT | Performed by: PHYSICIAN ASSISTANT

## 2019-07-29 PROCEDURE — G8417 CALC BMI ABV UP PARAM F/U: HCPCS | Performed by: PHYSICIAN ASSISTANT

## 2019-08-05 ENCOUNTER — TELEPHONE (OUTPATIENT)
Dept: SURGERY | Age: 24
End: 2019-08-05

## 2019-08-05 DIAGNOSIS — T14.90XA TRAUMA: Primary | ICD-10-CM

## 2019-08-07 RX ORDER — GAUZE BANDAGE 4" X 4"
1 BANDAGE TOPICAL DAILY PRN
Qty: 50 EACH | Refills: 0 | Status: SHIPPED | OUTPATIENT
Start: 2019-08-07 | End: 2019-08-27 | Stop reason: ALTCHOICE

## 2019-08-16 ENCOUNTER — TELEPHONE (OUTPATIENT)
Dept: NEUROSURGERY | Age: 24
End: 2019-08-16

## 2019-08-16 DIAGNOSIS — M54.2 NECK PAIN: Primary | ICD-10-CM

## 2019-08-16 DIAGNOSIS — M51.9 LUMBAR DISC DISEASE: ICD-10-CM

## 2019-08-16 DIAGNOSIS — S39.012A STRAIN OF LUMBAR REGION, INITIAL ENCOUNTER: ICD-10-CM

## 2019-08-16 RX ORDER — OXYCODONE HYDROCHLORIDE AND ACETAMINOPHEN 5; 325 MG/1; MG/1
1 TABLET ORAL EVERY 6 HOURS PRN
Qty: 10 TABLET | Refills: 0 | Status: SHIPPED | OUTPATIENT
Start: 2019-08-16 | End: 2019-08-27 | Stop reason: ALTCHOICE

## 2019-08-26 ENCOUNTER — TELEPHONE (OUTPATIENT)
Dept: SURGERY | Age: 24
End: 2019-08-26

## 2019-08-27 ENCOUNTER — OFFICE VISIT (OUTPATIENT)
Dept: SURGERY | Age: 24
End: 2019-08-27
Payer: COMMERCIAL

## 2019-08-27 VITALS
WEIGHT: 305 LBS | BODY MASS INDEX: 40.42 KG/M2 | OXYGEN SATURATION: 97 % | DIASTOLIC BLOOD PRESSURE: 95 MMHG | TEMPERATURE: 97.8 F | RESPIRATION RATE: 16 BRPM | HEART RATE: 103 BPM | HEIGHT: 73 IN | SYSTOLIC BLOOD PRESSURE: 138 MMHG

## 2019-08-27 DIAGNOSIS — Z09 FOLLOW UP: Primary | ICD-10-CM

## 2019-08-27 DIAGNOSIS — Z93.1 S/P PERCUTANEOUS ENDOSCOPIC GASTROSTOMY (PEG) TUBE PLACEMENT (HCC): ICD-10-CM
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
## 2019-08-27 PROCEDURE — G8427 DOCREV CUR MEDS BY ELIG CLIN: HCPCS | Performed by: NURSE PRACTITIONER

## 2019-08-27 PROCEDURE — 99212 OFFICE O/P EST SF 10 MIN: CPT | Performed by: NURSE PRACTITIONER

## 2019-08-27 PROCEDURE — G8417 CALC BMI ABV UP PARAM F/U: HCPCS | Performed by: NURSE PRACTITIONER

## 2019-08-27 PROCEDURE — 99211 OFF/OP EST MAY X REQ PHY/QHP: CPT | Performed by: NURSE PRACTITIONER

## 2019-09-04 ENCOUNTER — HOSPITAL ENCOUNTER (OUTPATIENT)
Dept: GENERAL RADIOLOGY | Age: 24
Discharge: HOME | End: 2019-09-06
Payer: COMMERCIAL

## 2019-09-04 ENCOUNTER — OFFICE VISIT (OUTPATIENT)
Dept: ORTHOPEDIC SURGERY | Age: 24
End: 2019-09-04
Payer: COMMERCIAL

## 2019-09-04 VITALS
HEART RATE: 99 BPM | WEIGHT: 305 LBS | SYSTOLIC BLOOD PRESSURE: 151 MMHG | BODY MASS INDEX: 40.24 KG/M2 | DIASTOLIC BLOOD PRESSURE: 90 MMHG

## 2019-09-04 DIAGNOSIS — S72.452D CLOSED DISPLACED SUPRACONDYLAR FRACTURE OF DISTAL END OF LEFT FEMUR WITHOUT INTRACONDYLAR EXTENSION WITH ROUTINE HEALING, SUBSEQUENT ENCOUNTER: ICD-10-CM

## 2019-09-04 DIAGNOSIS — S72.452G: Primary | ICD-10-CM

## 2019-09-04 PROCEDURE — 73560 X-RAY EXAM OF KNEE 1 OR 2: CPT

## 2019-09-04 PROCEDURE — 73552 X-RAY EXAM OF FEMUR 2/>: CPT

## 2019-09-04 PROCEDURE — 99212 OFFICE O/P EST SF 10 MIN: CPT | Performed by: PHYSICIAN ASSISTANT

## 2019-09-04 PROCEDURE — 99024 POSTOP FOLLOW-UP VISIT: CPT | Performed by: PHYSICIAN ASSISTANT

## 2019-09-04 RX ORDER — ERGOCALCIFEROL 1.25 MG/1
50000 CAPSULE ORAL WEEKLY
Qty: 12 CAPSULE | Refills: 0 | Status: SHIPPED
Start: 2019-09-04 | End: 2021-11-24

## 2019-09-04 NOTE — PROGRESS NOTES
infection  Incisions well healed without signs of redness, warmth or drainage  No edema noted  No effusion appreciated to the left knee  No tenderness palpation over distal femur fracture site  Compartments supple throughout thigh and leg  Calf supple and nontender  Demonstrates active knee flexion/extension, ankle plantar/dorsiflexion/great toe extension. States sensation intact to touch in sural/deep peroneal/superficial peroneal/saphenous/posterior tibial nerve distributions to foot/ankle. Palpable dorsalis pedis and posterior tibialis pulses, cap refill brisk in toes, foot warm/perfused. BP (!) 151/90 (Site: Left Upper Arm, Position: Sitting, Cuff Size: Large Adult)   Pulse 99   Wt (!) 305 lb (138.3 kg)   BMI 40.24 kg/m²     XR:   X-rays of the left femur and left knee demonstrate retrograde femoral nail in good alignment, no interval change in fracture alignment, no evidence of hardware failure or lucency. There is no significant appreciated interval callus formation. No other acute osseous abnormality identified. Assessment:   Diagnosis Orders   1. Closed displaced supracondylar fracture of distal end of left femur without intracondylar extension with delayed healing, subsequent encounter         Plan:  Weightbearing as tolerated on left lower extremity  Start vitamin D supplementation, continue calcium from your diet  Continue with strengthening lower extremities  No high impact activities yet    Bone stimulator ordered  If increased pain with increased activity back off and contact office    Otherwise follow up in 3 months  Call sooner with any questions or concerns    Electronically signed by Cherly Felty, PA-C on 9/4/2019 at 8:21 AM  Note: This report was completed using [employer] voiced recognition software. Every effort has been made to ensure accuracy; however, inadvertent computerized transcription errors may be present.

## 2019-09-04 NOTE — PATIENT INSTRUCTIONS
Weightbearing as tolerated on left lower extremity  Start vitamin D supplementation, continue calcium from your diet  Continue with strengthening lower extremities  No high impact activities yet    Bone stimulator ordered  If increased pain with increased activity back off and contact office    Otherwise follow up in 3 months  Call sooner with any questions or concerns

## 2019-09-09 ENCOUNTER — HOSPITAL ENCOUNTER (OUTPATIENT)
Age: 24
Discharge: HOME | End: 2019-09-11
Payer: COMMERCIAL

## 2019-09-09 ENCOUNTER — HOSPITAL ENCOUNTER (OUTPATIENT)
Dept: GENERAL RADIOLOGY | Age: 24
Discharge: HOME | End: 2019-09-11
Payer: COMMERCIAL

## 2019-09-09 ENCOUNTER — OFFICE VISIT (OUTPATIENT)
Dept: NEUROSURGERY | Age: 24
End: 2019-09-09
Payer: COMMERCIAL

## 2019-09-09 VITALS
WEIGHT: 315 LBS | DIASTOLIC BLOOD PRESSURE: 92 MMHG | HEART RATE: 99 BPM | BODY MASS INDEX: 41.75 KG/M2 | HEIGHT: 73 IN | SYSTOLIC BLOOD PRESSURE: 138 MMHG

## 2019-09-09 DIAGNOSIS — M54.2 NECK PAIN: ICD-10-CM

## 2019-09-09 DIAGNOSIS — M54.2 NECK PAIN OF OVER 3 MONTHS DURATION: Primary | ICD-10-CM

## 2019-09-09 PROCEDURE — G8417 CALC BMI ABV UP PARAM F/U: HCPCS | Performed by: PHYSICIAN ASSISTANT

## 2019-09-09 PROCEDURE — 1036F TOBACCO NON-USER: CPT | Performed by: PHYSICIAN ASSISTANT

## 2019-09-09 PROCEDURE — 72050 X-RAY EXAM NECK SPINE 4/5VWS: CPT

## 2019-09-09 PROCEDURE — 99212 OFFICE O/P EST SF 10 MIN: CPT | Performed by: PHYSICIAN ASSISTANT

## 2019-09-09 PROCEDURE — G8427 DOCREV CUR MEDS BY ELIG CLIN: HCPCS | Performed by: PHYSICIAN ASSISTANT

## 2019-09-10 DIAGNOSIS — S72.452K CLOSED DISPLACED SUPRACONDYLAR FRACTURE OF DISTAL END OF LEFT FEMUR WITHOUT INTRACONDYLAR EXTENSION WITH NONUNION, SUBSEQUENT ENCOUNTER: Primary | ICD-10-CM

## 2019-09-26 DIAGNOSIS — S72.452G: Primary | ICD-10-CM

## 2019-10-01 ENCOUNTER — HOSPITAL ENCOUNTER (OUTPATIENT)
Dept: GENERAL RADIOLOGY | Age: 24
Discharge: HOME | End: 2019-10-03
Payer: COMMERCIAL

## 2019-10-01 ENCOUNTER — HOSPITAL ENCOUNTER (OUTPATIENT)
Age: 24
Discharge: HOME | End: 2019-10-03
Payer: COMMERCIAL

## 2019-10-01 DIAGNOSIS — S72.452G: ICD-10-CM

## 2019-10-01 PROCEDURE — 73560 X-RAY EXAM OF KNEE 1 OR 2: CPT

## 2019-10-31 ENCOUNTER — TELEPHONE (OUTPATIENT)
Dept: ORTHOPEDIC SURGERY | Age: 24
End: 2019-10-31

## 2019-12-02 ENCOUNTER — TELEPHONE (OUTPATIENT)
Dept: ORTHOPEDIC SURGERY | Age: 24
End: 2019-12-02

## 2019-12-02 DIAGNOSIS — T14.90XA TRAUMA: Primary | ICD-10-CM

## 2019-12-02 DIAGNOSIS — S72.452S CLOSED DISPLACED SUPRACONDYLAR FRACTURE OF DISTAL END OF LEFT FEMUR WITHOUT INTRACONDYLAR EXTENSION, SEQUELA: ICD-10-CM

## 2020-01-08 ENCOUNTER — OFFICE VISIT (OUTPATIENT)
Dept: ORTHOPEDIC SURGERY | Age: 25
End: 2020-01-08
Payer: COMMERCIAL

## 2020-01-08 ENCOUNTER — HOSPITAL ENCOUNTER (OUTPATIENT)
Dept: GENERAL RADIOLOGY | Age: 25
Discharge: HOME | End: 2020-01-10
Payer: COMMERCIAL

## 2020-01-08 VITALS
HEIGHT: 72 IN | HEART RATE: 96 BPM | BODY MASS INDEX: 40.63 KG/M2 | DIASTOLIC BLOOD PRESSURE: 88 MMHG | WEIGHT: 300 LBS | RESPIRATION RATE: 18 BRPM | TEMPERATURE: 97.1 F | SYSTOLIC BLOOD PRESSURE: 129 MMHG

## 2020-01-08 PROCEDURE — G8427 DOCREV CUR MEDS BY ELIG CLIN: HCPCS | Performed by: NURSE PRACTITIONER

## 2020-01-08 PROCEDURE — G8484 FLU IMMUNIZE NO ADMIN: HCPCS | Performed by: NURSE PRACTITIONER

## 2020-01-08 PROCEDURE — 73560 X-RAY EXAM OF KNEE 1 OR 2: CPT

## 2020-01-08 PROCEDURE — 73552 X-RAY EXAM OF FEMUR 2/>: CPT

## 2020-01-08 PROCEDURE — G8417 CALC BMI ABV UP PARAM F/U: HCPCS | Performed by: NURSE PRACTITIONER

## 2020-01-08 PROCEDURE — 1036F TOBACCO NON-USER: CPT | Performed by: NURSE PRACTITIONER

## 2020-01-08 PROCEDURE — 99213 OFFICE O/P EST LOW 20 MIN: CPT | Performed by: NURSE PRACTITIONER

## 2020-01-08 NOTE — PATIENT INSTRUCTIONS
Weightbearing as tolerated on left lower extremity  Continue vitamin D supplementation, continue calcium from your diet  Continue with strengthening lower extremities   Continue using Bone stimulator   If increased pain with increased activity back off and contact office   Otherwise follow up as needed  Call sooner with any questions or concerns

## 2020-01-08 NOTE — PROGRESS NOTES
plantar/dorsiflexion/great toe extension. States sensation intact to touch in sural/deep peroneal/superficial peroneal/saphenous/posterior tibial nerve distributions to foot/ankle. Palpable dorsalis pedis and posterior tibialis pulses, cap refill brisk in toes, foot warm/perfused. /88 (Site: Left Upper Arm)   Pulse 96   Temp 97.1 °F (36.2 °C) (Oral)   Resp 18   Ht 6' (1.829 m)   Wt 300 lb (136.1 kg)   BMI 40.69 kg/m²     XR: X-rays of the left femur and knee demonstrating a left distal femur fracture with no change in alignment. Stable internal IM emma fixation. No signs of hardware failure or lucency. No acute fractures or dislocations. No other osseous abnormalities. Assessment:   Diagnosis Orders   1. Closed displaced supracondylar fracture of distal end of left femur without intracondylar extension, sequela         Plan:    Weightbearing as tolerated on left lower extremity  Continue vitamin D supplementation, continue calcium from your diet  Continue with strengthening lower extremities   Continue using Bone stimulator   If increased pain with increased activity back off and contact office   Otherwise follow up as needed  Call sooner with any questions or concerns    Electronically signed by MARTÍN Mendoza CNP on 1/10/2020 at 11:01 AM    Note: This report was completed using [employer] voiced recognition software.  Every effort has been made to ensure accuracy; however, inadvertent computerized transcription errors may be present.

## 2021-03-12 NOTE — PROGRESS NOTES
Increase iron  Start lisinopril  PD cath 3/30  Increase calcium acetate  Reschedule cytoxan and rheum appointment  Reviewed steroid dosing     Pharmacy Consultation Note  (Antibiotic Dosing and Monitoring)      Pharmacy is following for Vancomycin dosing. Allergies:  Patient has no known allergies. 22 y.o. male    Ht Readings from Last 1 Encounters:   06/11/19 6' (1.829 m)     Wt Readings from Last 1 Encounters:   06/16/19 (!) 389 lb (176.4 kg)       Recent Labs     06/15/19  0422 06/16/19  0445 06/17/19  0530   WBC 13.4* 13.6* 18.1*       Recent Labs     06/15/19  0422 06/16/19  0445 06/17/19  0530   BUN 12 15 12   CREATININE 0.6* 0.5* 0.4*       Estimated Creatinine Clearance: 476 mL/min (A) (based on SCr of 0.4 mg/dL (L)). Intake/Output Summary (Last 24 hours) at 6/17/2019 1714  Last data filed at 6/17/2019 1500  Gross per 24 hour   Intake 3645.5 ml   Output 2195 ml   Net 1450.5 ml       Cultures:  available culture and sensitivity results were reviewed in EPIC      Assessment:  Consulted by Dr. Steve Bonds to dose/monitor vancomycin  Estimated CrCl = 476 mL/min  Goal trough level = 15-20 mcg/mL    Plan: Will initiate vancomycin at a dose of 2500 mg once. May require q 8 hour dosing, have notified clinical for follow on the next dose prior to morning.   Monitor renal function   Clinical pharmacy will follow up and complete full consult note      SADI Dodson [facility] 6/17/2019 5:14 PM

## 2021-10-12 ENCOUNTER — TELEPHONE (OUTPATIENT)
Dept: ADMINISTRATIVE | Age: 26
End: 2021-10-12

## 2021-10-12 DIAGNOSIS — S72.452S CLOSED DISPLACED SUPRACONDYLAR FRACTURE OF DISTAL END OF LEFT FEMUR WITHOUT INTRACONDYLAR EXTENSION, SEQUELA: Primary | ICD-10-CM

## 2021-10-12 NOTE — TELEPHONE ENCOUNTER
Previous patient of Dr. Delphine Pleitez complaint of pain in left leg. Last seen 2019 for left leg. Please advise is okay to schedule .

## 2021-10-12 NOTE — TELEPHONE ENCOUNTER
We can see in 2-3 weeks if no new injury. Please have patient get new outpatient XRs prior to visit if able, if there is any new change on xrays due to increased pain may need to see sooner.   Xrays ordered an can be completed at any [address] [place] [address] location  Electronically signed by Nunu Flowers PA-C on 10/12/2021 at 4:17 PM

## 2021-10-12 NOTE — TELEPHONE ENCOUNTER
Call placed to patient's [facility], they stated no recent injuries or falls. They state he has always had this lingering pain, however recently the pain has increased and he would like to be seen. Last office visit : 1/8/2020. No future appointments. DATE OF PROCEDURE:  06/14/2019  PROCEDURES:  1. Intramedullary nailing of left femoral shaft fracture, retrograde. 2.  Removal of traction pin, superficial implant.   3.  A complexity modifier due to the fact that the patient's BMI is 55.3  kg/m2 which required double the time or 100% longer than normally would  provide with intramedullary fixation of the femoral shaft due to the  patient's body habitus.   SURGEON:  Antonia Varma MD

## 2021-10-14 NOTE — TELEPHONE ENCOUNTER
Call placed to patient's [facility], appointment made for 11/3. Directions to office provided and patient verbalized understanding and is agreeable with plan.

## 2021-11-03 ENCOUNTER — TELEPHONE (OUTPATIENT)
Dept: ADMINISTRATIVE | Age: 26
End: 2021-11-03

## 2021-11-05 NOTE — TELEPHONE ENCOUNTER
Call to [facility], advised pt appt r/s for [facility] 11/24 @ 9:15 am

## 2021-11-24 ENCOUNTER — OFFICE VISIT (OUTPATIENT)
Dept: ORTHOPEDIC SURGERY | Age: 26
End: 2021-11-24
Payer: COMMERCIAL

## 2021-11-24 ENCOUNTER — HOSPITAL ENCOUNTER (OUTPATIENT)
Dept: GENERAL RADIOLOGY | Age: 26
Discharge: HOME | End: 2021-11-26
Payer: COMMERCIAL

## 2021-11-24 DIAGNOSIS — S72.452S CLOSED DISPLACED SUPRACONDYLAR FRACTURE OF DISTAL END OF LEFT FEMUR WITHOUT INTRACONDYLAR EXTENSION, SEQUELA: ICD-10-CM

## 2021-11-24 DIAGNOSIS — S72.352S CLOSED DISPLACED COMMINUTED FRACTURE OF SHAFT OF LEFT FEMUR, SEQUELA: Primary | ICD-10-CM

## 2021-11-24 DIAGNOSIS — S72.352S CLOSED DISPLACED COMMINUTED FRACTURE OF SHAFT OF LEFT FEMUR, SEQUELA: ICD-10-CM

## 2021-11-24 PROCEDURE — G8421 BMI NOT CALCULATED: HCPCS | Performed by: PHYSICIAN ASSISTANT

## 2021-11-24 PROCEDURE — G8484 FLU IMMUNIZE NO ADMIN: HCPCS | Performed by: PHYSICIAN ASSISTANT

## 2021-11-24 PROCEDURE — 99212 OFFICE O/P EST SF 10 MIN: CPT | Performed by: PHYSICIAN ASSISTANT

## 2021-11-24 PROCEDURE — 73552 X-RAY EXAM OF FEMUR 2/>: CPT

## 2021-11-24 PROCEDURE — 73562 X-RAY EXAM OF KNEE 3: CPT

## 2021-11-24 PROCEDURE — 99214 OFFICE O/P EST MOD 30 MIN: CPT | Performed by: PHYSICIAN ASSISTANT

## 2021-11-24 PROCEDURE — 1036F TOBACCO NON-USER: CPT | Performed by: PHYSICIAN ASSISTANT

## 2021-11-24 PROCEDURE — G8427 DOCREV CUR MEDS BY ELIG CLIN: HCPCS | Performed by: PHYSICIAN ASSISTANT

## 2021-11-24 NOTE — PATIENT INSTRUCTIONS
You were ordered CT of left thigh by your Orthopedic Provider.   If you do not hear from that department please contact: [phone]

## 2021-11-24 NOTE — PROGRESS NOTES
Patient here for a follow up on his left leg. No new injury to the leg. Patient states that when is standing for a long period of time, he feels like the leg is just stiff. Patient states that after he is standing and then goes to sit down then get back up he feels like he limps for a few steps till the discomfort goes away. Patient is taking aleve as needed for pain.              Electronically signed by Ravi Acevedo MA on 11/24/2021 at 9:38 AM

## 2021-11-24 NOTE — PROGRESS NOTES
Chief Complaint   Patient presents with    Leg Pain     left     OP:SURGEON: Nancy Aggarwal MD     DATE OF PROCEDURE:  06/14/2019  PROCEDURES:  1.  Intramedullary nailing of left femoral shaft fracture, retrograde. 2.  Removal of traction pin, superficial implant. Subjective:  Gera Ovalles is approximately following up from the above date of surgery. He is still working, full weightbearing, does not use any ADD but states that he does have intermittent but constant severity pain to the distal anterior left thigh and sometimes proximally about the left hip, worse with increased activity or standing from a seated position, better with rest.  States that he has had this intermittent pain since his last office visit almost 2 years ago. He has stopped using bone stimulator, calcium, vitamin D as previously prescribed in the orthopedic office. No new injuries. Denies any nicotine use. No other significant past medical history. Review of Systems -  all pertinent positives and negatives in HPI. Objective:    General: Alert and oriented X 3, normocephalic atraumatic, external ears and eye normal, sclera clear, no acute distress, respirations easy and unlabored with no audible wheezes, skin warm and dry, speech and dress appropriate for noted age, affect euthymic. Extremity:  Left Lower Extremity  Skin clean dry and intact, without signs of infection  Compartments supple throughout thigh and leg  Calf supple and nontender  Demonstrates active knee flexion/extension, ankle plantar/dorsiflexion/great toe extension. States sensation intact to touch in sural/deep peroneal/superficial peroneal/saphenous/posterior tibial nerve distributions to foot/ankle. Palpable dorsalis pedis and posterior tibialis pulses, cap refill brisk in toes, foot warm/perfused.   5/5 L hip and knee  nontender about the knee, thigh, hip to palpation  Mild hip discomfort to passive IR, no discomfort to ER             There were no vitals taken for this visit. XR:   Narrative   EXAMINATION:   THREE XRAY VIEWS OF THE LEFT KNEE; 2 XRAY VIEWS OF THE LEFT FEMUR       11/24/2021 8:28 am       COMPARISON:   8 January 2020       HISTORY:   ORDERING SYSTEM PROVIDED HISTORY: Closed displaced supracondylar fracture of   distal end of left femur without intracondylar extension, sequela   TECHNOLOGIST PROVIDED HISTORY:   AP/LATERAL WEIGHTBEARING   SUNRISE OR MERCHANT VIEW OF PATELLA   Reason for exam:->Knee pain   What reading provider will be dictating this exam?->CRC; ORDERING SYSTEM   PROVIDED HISTORY: Closed displaced supracondylar fracture of distal end of   left femur without intracondylar extension, sequela   TECHNOLOGIST PROVIDED HISTORY:   What reading provider will be dictating this exam?->CRC       FINDINGS:   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  No new abnormal findings   referable to the left knee.             Assessment:   Diagnosis Orders   1. Closed displaced comminuted fracture of shaft of left femur, sequela  CT FEMUR LEFT WO CONTRAST    TSH    PTH, Intact    T4    CBC    COMPREHENSIVE METABOLIC PANEL    SEDIMENTATION RATE    C-Reactive Protein       Plan:   Reviewed x-rays with patient today in office    WBAT L LE   CT L femur ordered to assess healing   Adequate Ca, Vit D, protein dietary intake    Discussion regarding continued conservative tx w/ Ca/Vit D/bone stim with nonunion lab work up vs revision femur emma pending CT results    Discussed with Dr. Delphine Pleitez      Follow up in 2-3 weeks to discuss CT results     Electronically signed by Pasquale Wong PA-C on 11/24/2021 at 1:06 PM  Note: This report was completed using [employer] voiced recognition software. Every effort has been made to ensure accuracy; however, inadvertent computerized transcription errors may be present.

## 2022-03-23 NOTE — ED NOTES
Patient would like to schedule screening colonoscopy.  He has no one that would be able to drive him home.  I have discussed the option of having colonoscopy without sedation. I have also discussed seeing Dr. Raymond in office first to discuss colonoscopy without sedation.  He  Is agreeable to meet Dr. Raymond the day of test and will do it without sedation, he verbalized understanding of this.   He is scheduled for colonoscopy with Dr Raymond on 4/22/2022.  Instructions given to patient, patient verbalized understanding.  Informed that an email will be sent with the instructions.  Bowel prep Trilyte electronically submitted today.   Pt turned to right side with spinal precautions. Board removed. Pt c/o bilateral hip pain.       [address] [facility] 121, RN  06/11/19 4291

## 2023-04-24 NOTE — ED NOTES
Chest pelvis and left femur completed by xray.      [address] [facility] 121, RN  06/11/19 0564 Chief Complaint   Patient presents with    Mouth Lesions     Sensation of numbness on the right posterior lateral tongue       PROCEDURE:  FLEXIBLE FIBEROPTIC NASOPHARYNGOLARYNGOSCOPY    HISTORY:  Numb sensation like something stuck back there like a kernel of corn. No pain. Still there, the same, waxes and wanes, notices more when feeling sick or stuffy or had too much to drink the day before. Never goes away completely. INDICATION:  Inadequate visualization by indirect laryngoscopy mirror examination and need for detailed endoscopic examination of the larynx and pharynx to evaluate the upper aerodigestive tract for etiology of abnormal numbness sensation in the tongue. INFORMED CONSENT:  The procedure was described to the patient, including method of anesthesia. The patient was advised of the medical necessity for this procedure. The risks and potential complications were discussed, including, but not limited to, bleeding, infection, adverse reaction to medications, hoarseness, sore throat, and inability to obtain adequate visualization. The expected outcome, potential benefits and the alternatives of therapy were discussed. Cassus asked appropriate questions and then expressed the lack of any further questions, understanding, acceptance, and the desire to undergo with this procedure, granting verbal informed consent. FINDINGS:  There was lesion detected in the area of concern on the right posterior lateral tongue. The vocal cords appeared to be normal, with no nodule, ulceration, polyp, leukoplakia or other lesions, and appeared to be normally mobile bilaterally with midline approximation on phonation. Sensation of the hypopharynx and larynx appeared to be normal when touched by the end of the flexible scope.  The nasopharynx, eustachian tube orifices and fossa of Rosenmüller, oropharynx, base of tongue, hypopharynx, supraglottis, subglottis, and piriform sinuses all appeared to be

## (undated) DEVICE — GLOVE SURG SZ 75 STD WHT LTX SYN POLYMER BEAD REINF ANTI RL

## (undated) DEVICE — SURGICAL PROCEDURE PACK EENT CUST

## (undated) DEVICE — TUBE TRACH AD L105MM OD13.3MM ID8MM PROX EXTN CUF HI VOL LO

## (undated) DEVICE — DRAPE C ARM W41XL74IN UNIV MOB W RUBBERBAND CLP

## (undated) DEVICE — TOWEL,OR,DSP,ST,BLUE,STD,6/PK,12PK/CS: Brand: MEDLINE

## (undated) DEVICE — PATIENT RETURN ELECTRODE, SINGLE-USE, CONTACT QUALITY MONITORING, ADULT, WITH 9FT CORD, FOR PATIENTS WEIGING OVER 33LBS. (15KG): Brand: MEGADYNE

## (undated) DEVICE — GLOVE SURG SZ 8 L12IN FNGR THK79MIL GRN LTX FREE

## (undated) DEVICE — BANDAGE COMPR W6INXL10YD ST M E WHITE/BEIGE

## (undated) DEVICE — Z DISCONTINUED USE 2275686 GLOVE SURG SZ 8 L12IN FNGR THK13MIL WHT ISOLEX POLYISOPRENE

## (undated) DEVICE — BLADE CLIPPER GEN PURP NS

## (undated) DEVICE — STANDARD HYPODERMIC NEEDLE,ALUMINUM HUB: Brand: MONOJECT

## (undated) DEVICE — TOTAL HIP PK

## (undated) DEVICE — ROD RMR L950MM DIA2.5MM W/ EXTN BALL TIP

## (undated) DEVICE — TUBING SUCT 12FR MAL ALUM SHFT FN CAP VENT UNIV CONN W/ OBT

## (undated) DEVICE — SET ORTHO STD STORTSTD1

## (undated) DEVICE — INTENDED FOR TISSUE SEPARATION, AND OTHER PROCEDURES THAT REQUIRE A SHARP SURGICAL BLADE TO PUNCTURE OR CUT.: Brand: BARD-PARKER ® STAINLESS STEEL BLADES

## (undated) DEVICE — DRILL SYSTEM 7

## (undated) DEVICE — Z DISCONTINUED PER MEDLINE USE 2741944 DRESSING AQUACEL 12 IN SURG W9XL30CM SIL CVR WTRPRF VIR BACT BARR ANTIMIC

## (undated) DEVICE — BIT DRL L145MM DIA4.2MM ST 3 FLUT NDL PNT QUIK CPL FOR FEM

## (undated) DEVICE — BIT DRL L330MM DIA4.2MM CALIB L100MM ST 3 FLUT QUIK CPL FOR

## (undated) DEVICE — BLOCK BITE 60FR RUBBER ADLT DENTAL

## (undated) DEVICE — PADDING CAST W6INXL4YD COT LO LINTING WYTEX

## (undated) DEVICE — DEFENDO AIR WATER SUCTION AND BIOPSY VALVE KIT FOR  OLYMPUS: Brand: DEFENDO AIR/WATER/SUCTION AND BIOPSY VALVE

## (undated) DEVICE — 1.5L THIN WALL CAN: Brand: CRD

## (undated) DEVICE — Z DISCONTINUED PER MEDLINE USE 2741942 DRESSING AQUACEL 6 IN ALG W9XL15CM SIL CVR WTRPRF VIR BACT BARR ANTIMIC

## (undated) DEVICE — SPONGE,PEANUT,XRAY,ST,SM,3/8",5/CARD: Brand: MEDLINE INDUSTRIES, INC.

## (undated) DEVICE — DRIP REDUCTION MANIFOLD

## (undated) DEVICE — SET ORTHO STD STORTSTD2

## (undated) DEVICE — SURGICAL PROCEDURE PACK BASIC

## (undated) DEVICE — BANDAGE COMPR W4INXL10YD WHITE/BEIGE E MTRX HK LOOP CLSR

## (undated) DEVICE — Device

## (undated) DEVICE — 3M™ STERI-DRAPE™ U-DRAPE 1015: Brand: STERI-DRAPE™

## (undated) DEVICE — DRESSING,GAUZE,XEROFORM,CURAD,5"X9",ST: Brand: CURAD

## (undated) DEVICE — GAUZE,SPONGE,AVANT,4"X4",4PLY,STRL,10/TR: Brand: MEDLINE

## (undated) DEVICE — SET INSTR TRACH

## (undated) DEVICE — Z DUP USE 2612868 DRESSING FOAM W3.5XL3.5IN PNK HYDROCELLULAR NONADHESIVE KEY

## (undated) DEVICE — CATHETER ETER SUCT 14FR RED POLYPR STR OPN W VLV

## (undated) DEVICE — GAUZE,SPONGE,POST-OP,4X3,STRL,LF: Brand: MEDLINE

## (undated) DEVICE — GOWN,AURORA,BRTHSLV,2XL,18/CS: Brand: MEDLINE

## (undated) DEVICE — GOWN,BREATHABLE SLV,AURORA,XLG,STRL: Brand: MEDLINE

## (undated) DEVICE — JELLY LUBRICATING 4OZ FLIP TOP TB E Z

## (undated) DEVICE — PADDING,UNDERCAST,COTTON, 4"X4YD STERILE: Brand: MEDLINE

## (undated) DEVICE — CONTROL SYRINGE LUER-LOCK TIP: Brand: MONOJECT

## (undated) DEVICE — CHLORAPREP 26ML ORANGE

## (undated) DEVICE — DRAPE,REIN 53X77,STERILE: Brand: MEDLINE

## (undated) DEVICE — PONSKY PEG SAFETY SYSTEM: Brand: PONSKY PEG SAFETY SYSTEM

## (undated) DEVICE — AGENT HEMSTAT W2XL3IN OXIDIZED REGENERATED CELOS ABSRB

## (undated) DEVICE — GUIDEWIRE ORTH L290MM DIA3.2MM FOR RG AG EXPERT FEM NAILING

## (undated) DEVICE — SYRINGE 20ML LL S/C 50

## (undated) DEVICE — GLOVE ORANGE PI 8   MSG9080